# Patient Record
Sex: MALE | Race: WHITE | Employment: OTHER | URBAN - METROPOLITAN AREA
[De-identification: names, ages, dates, MRNs, and addresses within clinical notes are randomized per-mention and may not be internally consistent; named-entity substitution may affect disease eponyms.]

---

## 2020-01-20 LAB
ALBUMIN SERPL-MCNC: NORMAL G/DL
ALP BLD-CCNC: NORMAL U/L
ALT SERPL-CCNC: NORMAL U/L
ANION GAP SERPL CALCULATED.3IONS-SCNC: NORMAL MMOL/L
AST SERPL-CCNC: NORMAL U/L
BASOPHILS ABSOLUTE: NORMAL
BASOPHILS ABSOLUTE: NORMAL
BASOPHILS RELATIVE PERCENT: NORMAL
BASOPHILS RELATIVE PERCENT: NORMAL
BILIRUB SERPL-MCNC: NORMAL MG/DL
BUN BLDV-MCNC: NORMAL MG/DL
CALCIUM SERPL-MCNC: NORMAL MG/DL
CHLORIDE BLD-SCNC: NORMAL MMOL/L
CO2: NORMAL
CREAT SERPL-MCNC: NORMAL MG/DL
EOSINOPHILS ABSOLUTE: NORMAL
EOSINOPHILS ABSOLUTE: NORMAL
EOSINOPHILS RELATIVE PERCENT: NORMAL
EOSINOPHILS RELATIVE PERCENT: NORMAL
GFR CALCULATED: NORMAL
GLUCOSE BLD-MCNC: NORMAL MG/DL
HCT VFR BLD CALC: NORMAL %
HCT VFR BLD CALC: NORMAL %
HEMOGLOBIN: NORMAL
HEMOGLOBIN: NORMAL
LYMPHOCYTES ABSOLUTE: NORMAL
LYMPHOCYTES ABSOLUTE: NORMAL
LYMPHOCYTES RELATIVE PERCENT: NORMAL
LYMPHOCYTES RELATIVE PERCENT: NORMAL
MCH RBC QN AUTO: NORMAL PG
MCH RBC QN AUTO: NORMAL PG
MCHC RBC AUTO-ENTMCNC: NORMAL G/DL
MCHC RBC AUTO-ENTMCNC: NORMAL G/DL
MCV RBC AUTO: NORMAL FL
MCV RBC AUTO: NORMAL FL
MONOCYTES ABSOLUTE: NORMAL
MONOCYTES ABSOLUTE: NORMAL
MONOCYTES RELATIVE PERCENT: NORMAL
MONOCYTES RELATIVE PERCENT: NORMAL
NEUTROPHILS ABSOLUTE: NORMAL
NEUTROPHILS ABSOLUTE: NORMAL
NEUTROPHILS RELATIVE PERCENT: NORMAL
NEUTROPHILS RELATIVE PERCENT: NORMAL
PDW BLD-RTO: NORMAL %
PDW BLD-RTO: NORMAL %
PLATELET # BLD: NORMAL 10*3/UL
PLATELET # BLD: NORMAL 10*3/UL
PMV BLD AUTO: NORMAL FL
PMV BLD AUTO: NORMAL FL
POTASSIUM SERPL-SCNC: NORMAL MMOL/L
RBC # BLD: NORMAL 10*6/UL
RBC # BLD: NORMAL 10*6/UL
SODIUM BLD-SCNC: NORMAL MMOL/L
TOTAL PROTEIN: NORMAL
WBC # BLD: NORMAL 10*3/UL
WBC # BLD: NORMAL 10*3/UL

## 2020-02-03 LAB
BASOPHILS ABSOLUTE: NORMAL
BASOPHILS RELATIVE PERCENT: NORMAL
EOSINOPHILS ABSOLUTE: NORMAL
EOSINOPHILS RELATIVE PERCENT: NORMAL
HCT VFR BLD CALC: NORMAL %
HEMOGLOBIN: NORMAL
LYMPHOCYTES ABSOLUTE: NORMAL
LYMPHOCYTES RELATIVE PERCENT: NORMAL
MCH RBC QN AUTO: NORMAL PG
MCHC RBC AUTO-ENTMCNC: NORMAL G/DL
MCV RBC AUTO: NORMAL FL
MONOCYTES ABSOLUTE: NORMAL
MONOCYTES RELATIVE PERCENT: NORMAL
NEUTROPHILS ABSOLUTE: NORMAL
NEUTROPHILS RELATIVE PERCENT: NORMAL
PDW BLD-RTO: NORMAL %
PLATELET # BLD: NORMAL 10*3/UL
PMV BLD AUTO: NORMAL FL
RBC # BLD: NORMAL 10*6/UL
WBC # BLD: NORMAL 10*3/UL

## 2020-03-02 LAB
ALBUMIN SERPL-MCNC: NORMAL G/DL
ALP BLD-CCNC: NORMAL U/L
ALT SERPL-CCNC: NORMAL U/L
ANION GAP SERPL CALCULATED.3IONS-SCNC: NORMAL MMOL/L
AST SERPL-CCNC: NORMAL U/L
BASOPHILS ABSOLUTE: NORMAL
BASOPHILS RELATIVE PERCENT: NORMAL
BILIRUB SERPL-MCNC: NORMAL MG/DL
BUN BLDV-MCNC: NORMAL MG/DL
CALCIUM SERPL-MCNC: NORMAL MG/DL
CHLORIDE BLD-SCNC: NORMAL MMOL/L
CO2: NORMAL
CREAT SERPL-MCNC: NORMAL MG/DL
EOSINOPHILS ABSOLUTE: NORMAL
EOSINOPHILS RELATIVE PERCENT: NORMAL
GFR CALCULATED: NORMAL
GLUCOSE BLD-MCNC: NORMAL MG/DL
HCT VFR BLD CALC: NORMAL %
HEMOGLOBIN: NORMAL
LYMPHOCYTES ABSOLUTE: NORMAL
LYMPHOCYTES RELATIVE PERCENT: NORMAL
MCH RBC QN AUTO: NORMAL PG
MCHC RBC AUTO-ENTMCNC: NORMAL G/DL
MCV RBC AUTO: NORMAL FL
MONOCYTES ABSOLUTE: NORMAL
MONOCYTES RELATIVE PERCENT: NORMAL
NEUTROPHILS ABSOLUTE: NORMAL
NEUTROPHILS RELATIVE PERCENT: NORMAL
PDW BLD-RTO: NORMAL %
PLATELET # BLD: NORMAL 10*3/UL
PMV BLD AUTO: NORMAL FL
POTASSIUM SERPL-SCNC: NORMAL MMOL/L
RBC # BLD: NORMAL 10*6/UL
SODIUM BLD-SCNC: NORMAL MMOL/L
TOTAL PROTEIN: NORMAL
WBC # BLD: NORMAL 10*3/UL

## 2020-07-20 LAB
ALBUMIN SERPL-MCNC: NORMAL G/DL
ALP BLD-CCNC: NORMAL U/L
ALT SERPL-CCNC: NORMAL U/L
ANION GAP SERPL CALCULATED.3IONS-SCNC: NORMAL MMOL/L
AST SERPL-CCNC: NORMAL U/L
BILIRUB SERPL-MCNC: NORMAL MG/DL
BUN BLDV-MCNC: 10 MG/DL
CALCIUM SERPL-MCNC: NORMAL MG/DL
CHLORIDE BLD-SCNC: NORMAL MMOL/L
CO2: NORMAL
CREAT SERPL-MCNC: 0.86 MG/DL
GFR CALCULATED: NORMAL
GLUCOSE BLD-MCNC: 89 MG/DL
POTASSIUM SERPL-SCNC: 39 MMOL/L
SODIUM BLD-SCNC: 135 MMOL/L
TOTAL PROTEIN: NORMAL

## 2020-12-17 ENCOUNTER — OFFICE VISIT (OUTPATIENT)
Dept: FAMILY MEDICINE CLINIC | Age: 60
End: 2020-12-17
Payer: COMMERCIAL

## 2020-12-17 VITALS
TEMPERATURE: 99.3 F | HEIGHT: 71 IN | RESPIRATION RATE: 16 BRPM | OXYGEN SATURATION: 97 % | HEART RATE: 97 BPM | WEIGHT: 144.1 LBS | DIASTOLIC BLOOD PRESSURE: 85 MMHG | BODY MASS INDEX: 20.17 KG/M2 | SYSTOLIC BLOOD PRESSURE: 147 MMHG

## 2020-12-17 DIAGNOSIS — I10 ESSENTIAL HYPERTENSION: ICD-10-CM

## 2020-12-17 PROBLEM — G62.0 NEUROPATHY DUE TO CHEMOTHERAPEUTIC DRUG (HCC): Status: ACTIVE | Noted: 2020-12-17

## 2020-12-17 PROBLEM — T45.1X5A NEUROPATHY DUE TO CHEMOTHERAPEUTIC DRUG (HCC): Status: ACTIVE | Noted: 2020-12-17

## 2020-12-17 PROBLEM — C18.9 COLON CANCER METASTASIZED TO INTRA-ABDOMINAL LYMPH NODE (HCC): Status: ACTIVE | Noted: 2020-12-17

## 2020-12-17 PROBLEM — C77.2 COLON CANCER METASTASIZED TO INTRA-ABDOMINAL LYMPH NODE (HCC): Status: ACTIVE | Noted: 2020-12-17

## 2020-12-17 PROBLEM — F17.200 SMOKER: Status: ACTIVE | Noted: 2020-12-17

## 2020-12-17 LAB
ALBUMIN SERPL-MCNC: 5 G/DL (ref 3.5–5.2)
ALP BLD-CCNC: 154 U/L (ref 40–129)
ALT SERPL-CCNC: 67 U/L (ref 0–40)
ANION GAP SERPL CALCULATED.3IONS-SCNC: 14 MMOL/L (ref 7–16)
AST SERPL-CCNC: 118 U/L (ref 0–39)
BASOPHILS ABSOLUTE: 0.07 E9/L (ref 0–0.2)
BASOPHILS RELATIVE PERCENT: 0.9 % (ref 0–2)
BILIRUB SERPL-MCNC: 0.6 MG/DL (ref 0–1.2)
BILIRUBIN URINE: NEGATIVE
BLOOD, URINE: NEGATIVE
BUN BLDV-MCNC: 7 MG/DL (ref 8–23)
CALCIUM SERPL-MCNC: 10.2 MG/DL (ref 8.6–10.2)
CHLORIDE BLD-SCNC: 95 MMOL/L (ref 98–107)
CHOLESTEROL, TOTAL: 286 MG/DL (ref 0–199)
CLARITY: CLEAR
CO2: 24 MMOL/L (ref 22–29)
COLOR: YELLOW
CREAT SERPL-MCNC: 1.9 MG/DL (ref 0.7–1.2)
EOSINOPHILS ABSOLUTE: 0.08 E9/L (ref 0.05–0.5)
EOSINOPHILS RELATIVE PERCENT: 1.1 % (ref 0–6)
GFR AFRICAN AMERICAN: 44
GFR NON-AFRICAN AMERICAN: 36 ML/MIN/1.73
GLUCOSE BLD-MCNC: 70 MG/DL (ref 74–99)
GLUCOSE URINE: NEGATIVE MG/DL
HCT VFR BLD CALC: 42.6 % (ref 37–54)
HDLC SERPL-MCNC: 222 MG/DL
HEMOGLOBIN: 14.2 G/DL (ref 12.5–16.5)
IMMATURE GRANULOCYTES #: 0.04 E9/L
IMMATURE GRANULOCYTES %: 0.5 % (ref 0–5)
KETONES, URINE: NEGATIVE MG/DL
LDL CHOLESTEROL CALCULATED: 54 MG/DL (ref 0–99)
LEUKOCYTE ESTERASE, URINE: NEGATIVE
LYMPHOCYTES ABSOLUTE: 1 E9/L (ref 1.5–4)
LYMPHOCYTES RELATIVE PERCENT: 13.4 % (ref 20–42)
MCH RBC QN AUTO: 33.1 PG (ref 26–35)
MCHC RBC AUTO-ENTMCNC: 33.3 % (ref 32–34.5)
MCV RBC AUTO: 99.3 FL (ref 80–99.9)
MONOCYTES ABSOLUTE: 0.74 E9/L (ref 0.1–0.95)
MONOCYTES RELATIVE PERCENT: 9.9 % (ref 2–12)
NEUTROPHILS ABSOLUTE: 5.53 E9/L (ref 1.8–7.3)
NEUTROPHILS RELATIVE PERCENT: 74.2 % (ref 43–80)
NITRITE, URINE: NEGATIVE
PDW BLD-RTO: 13.3 FL (ref 11.5–15)
PH UA: 6 (ref 5–9)
PLATELET # BLD: 243 E9/L (ref 130–450)
PMV BLD AUTO: 9.2 FL (ref 7–12)
POTASSIUM SERPL-SCNC: 4.2 MMOL/L (ref 3.5–5)
PROTEIN UA: NEGATIVE MG/DL
RBC # BLD: 4.29 E12/L (ref 3.8–5.8)
SODIUM BLD-SCNC: 133 MMOL/L (ref 132–146)
SPECIFIC GRAVITY UA: 1.01 (ref 1–1.03)
TOTAL PROTEIN: 8.5 G/DL (ref 6.4–8.3)
TRIGL SERPL-MCNC: 49 MG/DL (ref 0–149)
UROBILINOGEN, URINE: 0.2 E.U./DL
VLDLC SERPL CALC-MCNC: 10 MG/DL
WBC # BLD: 7.5 E9/L (ref 4.5–11.5)

## 2020-12-17 PROCEDURE — G8427 DOCREV CUR MEDS BY ELIG CLIN: HCPCS | Performed by: INTERNAL MEDICINE

## 2020-12-17 PROCEDURE — G8420 CALC BMI NORM PARAMETERS: HCPCS | Performed by: INTERNAL MEDICINE

## 2020-12-17 PROCEDURE — G8484 FLU IMMUNIZE NO ADMIN: HCPCS | Performed by: INTERNAL MEDICINE

## 2020-12-17 PROCEDURE — 99202 OFFICE O/P NEW SF 15 MIN: CPT | Performed by: INTERNAL MEDICINE

## 2020-12-17 PROCEDURE — 4004F PT TOBACCO SCREEN RCVD TLK: CPT | Performed by: INTERNAL MEDICINE

## 2020-12-17 PROCEDURE — 3017F COLORECTAL CA SCREEN DOC REV: CPT | Performed by: INTERNAL MEDICINE

## 2020-12-17 RX ORDER — AMLODIPINE BESYLATE 5 MG/1
5 TABLET ORAL DAILY
COMMUNITY
End: 2020-12-17 | Stop reason: SDUPTHER

## 2020-12-17 RX ORDER — AMLODIPINE BESYLATE 5 MG/1
5 TABLET ORAL DAILY
Qty: 90 TABLET | Refills: 0 | Status: CANCELLED | OUTPATIENT
Start: 2020-12-17

## 2020-12-17 RX ORDER — DULOXETIN HYDROCHLORIDE 60 MG/1
60 CAPSULE, DELAYED RELEASE ORAL DAILY
COMMUNITY
End: 2020-12-17 | Stop reason: ALTCHOICE

## 2020-12-17 RX ORDER — AMLODIPINE BESYLATE 5 MG/1
5 TABLET ORAL DAILY
Qty: 90 TABLET | Refills: 1 | Status: SHIPPED
Start: 2020-12-17 | End: 2021-03-25 | Stop reason: SDUPTHER

## 2020-12-17 RX ORDER — GABAPENTIN 100 MG/1
100 CAPSULE ORAL 3 TIMES DAILY
Qty: 90 CAPSULE | Refills: 5 | Status: SHIPPED
Start: 2020-12-17 | End: 2021-06-29 | Stop reason: SDUPTHER

## 2020-12-17 SDOH — ECONOMIC STABILITY: TRANSPORTATION INSECURITY
IN THE PAST 12 MONTHS, HAS LACK OF TRANSPORTATION KEPT YOU FROM MEETINGS, WORK, OR FROM GETTING THINGS NEEDED FOR DAILY LIVING?: NO

## 2020-12-17 SDOH — ECONOMIC STABILITY: FOOD INSECURITY: WITHIN THE PAST 12 MONTHS, YOU WORRIED THAT YOUR FOOD WOULD RUN OUT BEFORE YOU GOT MONEY TO BUY MORE.: SOMETIMES TRUE

## 2020-12-17 SDOH — ECONOMIC STABILITY: TRANSPORTATION INSECURITY
IN THE PAST 12 MONTHS, HAS THE LACK OF TRANSPORTATION KEPT YOU FROM MEDICAL APPOINTMENTS OR FROM GETTING MEDICATIONS?: NO

## 2020-12-17 SDOH — ECONOMIC STABILITY: FOOD INSECURITY: WITHIN THE PAST 12 MONTHS, THE FOOD YOU BOUGHT JUST DIDN'T LAST AND YOU DIDN'T HAVE MONEY TO GET MORE.: SOMETIMES TRUE

## 2020-12-17 SDOH — ECONOMIC STABILITY: INCOME INSECURITY: HOW HARD IS IT FOR YOU TO PAY FOR THE VERY BASICS LIKE FOOD, HOUSING, MEDICAL CARE, AND HEATING?: NOT VERY HARD

## 2020-12-17 ASSESSMENT — ENCOUNTER SYMPTOMS
VOMITING: 0
COLOR CHANGE: 0
COUGH: 0
DIARRHEA: 0
WHEEZING: 0
NAUSEA: 0
ABDOMINAL PAIN: 0
SHORTNESS OF BREATH: 0

## 2020-12-17 ASSESSMENT — PATIENT HEALTH QUESTIONNAIRE - PHQ9
SUM OF ALL RESPONSES TO PHQ QUESTIONS 1-9: 0
SUM OF ALL RESPONSES TO PHQ QUESTIONS 1-9: 0
SUM OF ALL RESPONSES TO PHQ9 QUESTIONS 1 & 2: 0
SUM OF ALL RESPONSES TO PHQ QUESTIONS 1-9: 0
1. LITTLE INTEREST OR PLEASURE IN DOING THINGS: 0
2. FEELING DOWN, DEPRESSED OR HOPELESS: 0

## 2020-12-17 NOTE — PROGRESS NOTES
12/17/2020    Chief Complaint   Patient presents with   [address] Other     due for port flush     Medication Refill     neuropathy: currently taking cymbalta but does not feel it is helping much    Referral - General     oncology    Flu Vaccine     pt refused       HPI  Hx of colon cancerdiagnosed Nov 2019 w/ 2/31 lymph nodes positive. and underwent partial colon resection. Had L subclavian port placed in early 2020 and had several rounds of chemotherapy ending July. He is due for scans to assess status of his cancer and requires referral to an oncologist.   C/o mild burning/tingling in fingers due to chemo induced neuropathy. Hx of htn, recently ran out of meds. Generally feeling well. Review of Systems   Constitutional: Negative for chills, diaphoresis and fever. Respiratory: Negative for cough, shortness of breath and wheezing. Cardiovascular: Negative for chest pain and leg swelling. Gastrointestinal: Negative for abdominal pain, diarrhea, nausea and vomiting. Genitourinary: Negative for dysuria and hematuria. Musculoskeletal: Negative for gait problem and joint swelling. Skin: Negative for color change and wound. Neurological: Negative for syncope, facial asymmetry and light-headedness. Hematological: Negative for adenopathy. Psychiatric/Behavioral: Negative for confusion and decreased concentration. Past Medical History:   Diagnosis Date    Colon cancer ([facility] [address]     Hypertension        Social History     Tobacco Use    Smoking status: Current Every Day Smoker     Packs/day: 1.00     Years: 40.00     Pack years: 40.00     Types: Cigarettes    Smokeless tobacco: Never Used   Substance Use Topics    Alcohol use: Yes     Alcohol/week: 70.0 standard drinks     Types: 70 Cans of beer per week     Social History     Substance and Sexual Activity   Drug Use Never       Past surgical, family, social history reviewed and updated.     BP (!) 147/85   Pulse 97   Temp 99.3 °F (37.4 °C) (Temporal)   Resp 16   Ht 5' 11\" (1.803 m)   Wt 144 lb 1.6 oz (65.4 kg)   SpO2 97%   BMI 20.10 kg/m²     Physical Exam  Vitals signs reviewed. Constitutional:       General: He is not in acute distress. Eyes:      General: No scleral icterus. Cardiovascular:      Heart sounds: Normal heart sounds. No murmur. No gallop. Pulmonary:      Breath sounds: Normal breath sounds. No wheezing or rales. Musculoskeletal:      Right lower leg: No edema. Left lower leg: No edema. Neurological:      Mental Status: He is alert. Cranial Nerves: No cranial nerve deficit. Gait: Gait normal.   Psychiatric:         Thought Content: Thought content normal.       1. Colon cancer metastasized to intra-abdominal lymph node (HCC)  Stable. Will refer to establish with oncology and requested records  - Ambulatory referral to Oncology    2. Neuropathy due to chemotherapeutic drug ([address]  Used Cymbalta in recent past but now asking about alternative. No current depressed mood or anhedonia. Trial of gabapentin    3. Essential hypertension  Resume amlodipine  - Comprehensive Metabolic Panel; Future  - Lipid Panel; Future  - CBC Auto Differential; Future  - Urinalysis; Future    4.  Smoker  vaping now with intention of quitting

## 2020-12-22 ENCOUNTER — HOSPITAL ENCOUNTER (OUTPATIENT)
Dept: INFUSION THERAPY | Age: 60
Discharge: HOME | End: 2020-12-22
Payer: COMMERCIAL

## 2020-12-22 ENCOUNTER — OFFICE VISIT (OUTPATIENT)
Dept: ONCOLOGY | Age: 60
End: 2020-12-22
Payer: COMMERCIAL

## 2020-12-22 VITALS
TEMPERATURE: 98 F | DIASTOLIC BLOOD PRESSURE: 95 MMHG | HEART RATE: 103 BPM | HEIGHT: 71 IN | BODY MASS INDEX: 20.27 KG/M2 | WEIGHT: 144.8 LBS | OXYGEN SATURATION: 99 % | SYSTOLIC BLOOD PRESSURE: 172 MMHG

## 2020-12-22 DIAGNOSIS — C18.9 COLON CANCER METASTASIZED TO INTRA-ABDOMINAL LYMPH NODE (HCC): Primary | ICD-10-CM

## 2020-12-22 DIAGNOSIS — C77.2 COLON CANCER METASTASIZED TO INTRA-ABDOMINAL LYMPH NODE (HCC): Primary | ICD-10-CM

## 2020-12-22 LAB
ALBUMIN SERPL-MCNC: 4.7 G/DL (ref 3.5–5.2)
ALP BLD-CCNC: 176 U/L (ref 40–129)
ALT SERPL-CCNC: 75 U/L (ref 0–40)
ANION GAP SERPL CALCULATED.3IONS-SCNC: 13 MMOL/L (ref 7–16)
AST SERPL-CCNC: 129 U/L (ref 0–39)
BASOPHILS ABSOLUTE: 0.05 E9/L (ref 0–0.2)
BASOPHILS RELATIVE PERCENT: 0.7 % (ref 0–2)
BILIRUB SERPL-MCNC: 0.6 MG/DL (ref 0–1.2)
BUN BLDV-MCNC: 9 MG/DL (ref 8–23)
CALCIUM SERPL-MCNC: 9.7 MG/DL (ref 8.6–10.2)
CEA: 7.4 NG/ML (ref 0–5.2)
CHLORIDE BLD-SCNC: 97 MMOL/L (ref 98–107)
CO2: 25 MMOL/L (ref 22–29)
CREAT SERPL-MCNC: 0.8 MG/DL (ref 0.7–1.2)
EOSINOPHILS ABSOLUTE: 0.06 E9/L (ref 0.05–0.5)
EOSINOPHILS RELATIVE PERCENT: 0.8 % (ref 0–6)
GFR AFRICAN AMERICAN: >60
GFR NON-AFRICAN AMERICAN: >60 ML/MIN/1.73
GLUCOSE BLD-MCNC: 91 MG/DL (ref 74–99)
HCT VFR BLD CALC: 40.2 % (ref 37–54)
HEMOGLOBIN: 13.8 G/DL (ref 12.5–16.5)
IMMATURE GRANULOCYTES #: 0.04 E9/L
IMMATURE GRANULOCYTES %: 0.5 % (ref 0–5)
LYMPHOCYTES ABSOLUTE: 0.91 E9/L (ref 1.5–4)
LYMPHOCYTES RELATIVE PERCENT: 12.2 % (ref 20–42)
MCH RBC QN AUTO: 33.4 PG (ref 26–35)
MCHC RBC AUTO-ENTMCNC: 34.3 % (ref 32–34.5)
MCV RBC AUTO: 97.3 FL (ref 80–99.9)
MONOCYTES ABSOLUTE: 0.79 E9/L (ref 0.1–0.95)
MONOCYTES RELATIVE PERCENT: 10.6 % (ref 2–12)
NEUTROPHILS ABSOLUTE: 5.6 E9/L (ref 1.8–7.3)
NEUTROPHILS RELATIVE PERCENT: 75.2 % (ref 43–80)
PDW BLD-RTO: 13.2 FL (ref 11.5–15)
PLATELET # BLD: 222 E9/L (ref 130–450)
PMV BLD AUTO: 8.4 FL (ref 7–12)
POTASSIUM SERPL-SCNC: 4 MMOL/L (ref 3.5–5)
RBC # BLD: 4.13 E12/L (ref 3.8–5.8)
SODIUM BLD-SCNC: 135 MMOL/L (ref 132–146)
TOTAL PROTEIN: 8.2 G/DL (ref 6.4–8.3)
WBC # BLD: 7.5 E9/L (ref 4.5–11.5)

## 2020-12-22 PROCEDURE — 99214 OFFICE O/P EST MOD 30 MIN: CPT

## 2020-12-22 PROCEDURE — 82378 CARCINOEMBRYONIC ANTIGEN: CPT

## 2020-12-22 PROCEDURE — 80053 COMPREHEN METABOLIC PANEL: CPT

## 2020-12-22 PROCEDURE — 85025 COMPLETE CBC W/AUTO DIFF WBC: CPT

## 2020-12-22 PROCEDURE — 6360000002 HC RX W HCPCS: Performed by: INTERNAL MEDICINE

## 2020-12-22 PROCEDURE — 2580000003 HC RX 258: Performed by: INTERNAL MEDICINE

## 2020-12-22 RX ORDER — HEPARIN SODIUM (PORCINE) LOCK FLUSH IV SOLN 100 UNIT/ML 100 UNIT/ML
500 SOLUTION INTRAVENOUS PRN
Status: CANCELLED | OUTPATIENT
Start: 2020-12-22

## 2020-12-22 RX ORDER — SODIUM CHLORIDE 0.9 % (FLUSH) 0.9 %
10 SYRINGE (ML) INJECTION PRN
Status: DISCONTINUED | OUTPATIENT
Start: 2020-12-22 | End: 2020-12-23 | Stop reason: HOSPADM

## 2020-12-22 RX ORDER — HEPARIN SODIUM (PORCINE) LOCK FLUSH IV SOLN 100 UNIT/ML 100 UNIT/ML
500 SOLUTION INTRAVENOUS PRN
Status: DISCONTINUED | OUTPATIENT
Start: 2020-12-22 | End: 2020-12-23 | Stop reason: HOSPADM

## 2020-12-22 RX ORDER — SODIUM CHLORIDE 0.9 % (FLUSH) 0.9 %
10 SYRINGE (ML) INJECTION PRN
Status: CANCELLED | OUTPATIENT
Start: 2020-12-22

## 2020-12-22 RX ORDER — CALCIUM CITRATE/VITAMIN D3 200MG-6.25
1 TABLET ORAL DAILY PRN
COMMUNITY

## 2020-12-22 RX ADMIN — SODIUM CHLORIDE, PRESERVATIVE FREE 10 ML: 5 INJECTION INTRAVENOUS at 14:54

## 2020-12-22 RX ADMIN — Medication 500 UNITS: at 14:54

## 2020-12-22 NOTE — PROGRESS NOTES
Shantal Schulte  1960 63 y.o. Referring Physician:     PCP: Angel Pickard MD    There were no vitals filed for this visit. Wt Readings from Last 3 Encounters:   20 144 lb 12.8 oz (65.7 kg)   20 144 lb 1.6 oz (65.4 kg)        There is no height or weight on file to calculate BMI. Chief Complaint: No chief complaint on file. Cancer Staging  No matching staging information was found for the patient. Prior Radiation Therapy? NO    Concurrent Chemo/radiation? NO    Prior Chemotherapy? YES: Site Treated: colon          Facility: [place], [place]          Date:     Prior Hormonal Therapy? NO    Head and Neck Cancer? No, patient does NOT have HN cancer. LMP: na    Age at first Menses: na    : na    Para: na          Current Outpatient Medications:     Multiple Vitamins-Minerals (MULTIVITAMIN GUMMIES MENS) CHEW, Take 1 tablet by mouth daily as needed, Disp: , Rfl:     amLODIPine (NORVASC) 5 MG tablet, Take 1 tablet by mouth daily, Disp: 90 tablet, Rfl: 1    gabapentin (NEURONTIN) 100 MG capsule, Take 1 capsule by mouth 3 times daily for 180 days. , Disp: 90 capsule, Rfl: 5       Past Medical History:   Diagnosis Date    Cataract     bilateral    Colon cancer ([address]     Hypertension     Neuropathy     finger/feet from chemotherapy       Past Surgical History:   Procedure Laterality Date    COLON SURGERY      EYE SURGERY Bilateral     SUBTOTAL COLECTOMY  2019       Family History   Problem Relation Age of Onset    Stroke Mother     High Blood Pressure Mother     Heart Disease Father     Heart Attack Father     Kidney Disease Sister     Cancer Brother         Prostate    No Known Problems Maternal Aunt     No Known Problems Maternal Uncle     No Known Problems Paternal Aunt     No Known Problems Paternal Uncle     No Known Problems Maternal Grandmother     No Known Problems Maternal Grandfather     No Known Problems Paternal Grandmother No Known Problems Paternal Grandfather     No Known Problems Maternal Cousin     No Known Problems Paternal Cousin     No Known Problems Brother     Anxiety Disorder Daughter     No Known Problems Grandchild        Social History     Socioeconomic History    Marital status:      Spouse name: Not on file    Number of children: Not on file    Years of education: Not on file    Highest education level: Not on file   Occupational History    Not on file   Social Needs    Financial resource strain: Not very hard    Food insecurity     Worry: Sometimes true     Inability: Sometimes true    Transportation needs     Medical: No     Non-medical: No   Tobacco Use    Smoking status: Current Every Day Smoker     Packs/day: 1.00     Years: 40.00     Pack years: 40.00     Types: Cigarettes    Smokeless tobacco: Never Used    Tobacco comment: Informed of [facility] Cessation program, gave pamphlet. Substance and Sexual Activity    Alcohol use:  Yes     Alcohol/week: 70.0 standard drinks     Types: 70 Cans of beer per week    Drug use: Never    Sexual activity: Not Currently   Lifestyle    Physical activity     Days per week: Not on file     Minutes per session: Not on file    Stress: Not on file   Relationships    Social connections     Talks on phone: Not on file     Gets together: Not on file     Attends Taoism service: Not on file     Active member of club or organization: Not on file     Attends meetings of clubs or organizations: Not on file     Relationship status: Not on file    Intimate partner violence     Fear of current or ex partner: Not on file     Emotionally abused: Not on file     Physically abused: Not on file     Forced sexual activity: Not on file   Other Topics Concern    Not on file   Social History Narrative    Not on file           Occupation: , currently on permanent disability  Retired:  NO          REVIEW OF SYSTEMS:     Pacemaker/Defibulator/ICD:  No    Mediport: Yes, left chest           FALLS RISK SCREENING ASSESSMENT    Instructions:  Assess the patient and [place] the appropriate indicators that are present for fall risk identification. Total the numbers circled and assign a fall risk score from Table 2.  Reassess patient at a minimum every 12 weeks or with status change. Assessment   Date  12/22/2020     1. Mental Ability: confusion/cognitively impaired No - 0       2. Elimination Issues: incontinence, frequency No - 0       3. Ambulatory: use of assistive devices (walker, cane, off-loading devices), attached to equipment (IV pole, oxygen) No - 0     4. Sensory Limitations: dizziness, vertigo, impaired vision No - 0       5. Age Less than 65 years - 0       6. Medication: diuretics, strong analgesics, hypnotics, sedatives, antihypertensive agents   Yes - 3   7. Falls:  recent history of falls within the last 3 months (not to include slipping or tripping)   No - 0   TOTAL 3    If score of 4 or greater was education given? No       TABLE 2   Risk Score Risk Level Plan of Care   0-3 Little or  No Risk 1. Provide assistance as indicated for ambulation activities  2. Reorient confused/cognitively impaired patient  3. Call-light/bell within patient's reach  4. Chair/bed in low position, stretcher/bed with siderails up except when performing patient care activities  5. Educate patient/family/caregiver on falls prevention  6.  Reassess in 12 weeks or with any noted change in patient condition which places them at a risk for a fall   4-6 Moderate Risk 1. Provide assistance as indicated for ambulation activities  2. Reorient confused/cognitively impaired patient  3. Call-light/bell within patient's reach  4. Chair/bed in low position, stretcher/bed with siderails up except when performing patient care activities  5. Educate patient/family/caregiver on falls prevention  6.   Falls risk precaution (Yellow sticker Level II) placed on patient chart   7 or   Higher High Risk 1. Place patient in easily observable treatment room  2. Patient attended at all times by family member or staff  3. Provide assistance as indicated for ambulation activities  4. Reorient confused/cognitively impaired patient  5. Call-light/bell within patient's reach  6. Chair/bed in low position, stretcher/bed with siderails up except when performing patient care activities  7. Educate patient/family/caregiver on falls prevention  8. Falls risk precaution (Yellow sticker Level III) placed on patient chart           MALNUTRITION RISK SCREENING ASSESSMENT    Instructions:  Assess the patient and enter the appropriate indicators that are present for nutrition risk identification. Total the numbers entered and assign a risk score. Follow the appropriate action for total score listed below. Assessment   Date  12/22/2020     Have you lost weight without trying? 0- No     Have you been eating poorly because of a decreased appetite? 0- No   3. Do you have a diagnosis of head and neck cancer? 0- No                                                                                    TOTAL 0        Score of 0-1: No action  Score 2 or greater:   For Non-Diabetic Patient: Recommend adding Ensure Enlive 2 x daily and provide patient with Ensure wellness bag with coupons  For Diabetic Patient: Recommend adding Glucerna Shake 2 x daily and provide patient with Glucerna Wellness bag with coupons  Route to the dietitian via [address]    Are you having  difficulty performing daily routine tasks  due to fatigue or weakness (ie: bathing/showering, dressing, housework, meal prep, work, child Nisreen Areola): No     Do you have any arm flexibility/ROM restrictions, swelling or pain that limit activity: No     Any changes in memory, attention/focus that impact daily activities: No     Do you avoid participation in leisure/social activity due weakness, fatigue or pain: No     ARE ANY OF THE ABOVE ARE ANSWERED YES: No          PT ASSESSMENT FOR REFERRAL    Have you had any recent falls in past 2 months: No     Do you have difficulty  going up/down stairs: No     Are you having difficulty walking: No     Do you often hold onto furniture/environmental supports or feel off balance when you are walking: No     Do you need to take rest breaks when you are walking: No     Any pain on scale of 1-10 that limits your mobility: No 0/10    ARE ANY OF THE ABOVE ARE ANSWERED YES: No       PREHAB AUDIOLOGY REFERRAL    - Is patient planned to receive Cisplatin? No. This patient is not planned to start Cisplatin. - Is patient complaining of new onset hearing loss? No. Patient is not complaining of new onset hearing loss.         Yuri Hernandez

## 2020-12-22 NOTE — PROGRESS NOTES
PORT FLUSH    Patient presents to clinic for [facility] today. single  SQ port accessed per policy using 94Y, . 75 inch Wagner needle for good blood return. Aspirate for waste and specimen sent to lab. Site flushed easily with 10 mL NSS followed by 5 mL Heparin solution 100 units/ml rinse prior to de-access. Dry sterile dressing to area. Tolerated procedure well. Encouraged to schedule port flush every 4 weeks.

## 2020-12-22 NOTE — PROGRESS NOTES
[address]  [address] [place] [address]   Hematology/Oncology  Consult      Patient Name: Yasmine Lucia  YOB: 1960  PCP: Trey Brooks MD   Referring Provider: [id number] / [place] [id number]     Reason for Consultation:   Chief Complaint   Patient presents with    Follow-up        History of Present Illness: This pt is a pleasant 64 yo male who was diagnosed with colon cancer in 11/19 (reportedly stage IIIB), s/p resection and 2/31 LNs positive, and reportedly received adj chemotherapy and has chronic neuropathy from this. No records are available for review today, however he states he received 8 cycles of full dose FOLFOX and 4 cycles of DR FOLFOX. He still has his PORT. He has no acute complaints today, including abdominal pain, change in bowel habits, pain with defecation, tenesmus, hematochezia or melena. He has maintained a stable weight. He overall feels well    Diagnostic Data:     Past Medical History:   Diagnosis Date    Colon cancer ([facility] [address]     Hypertension        Patient Active Problem List    Diagnosis Date Noted    Colon cancer metastasized to intra-abdominal lymph node ([facility] [address] 12/17/2020    Neuropathy due to chemotherapeutic drug ([facility] [address] 12/17/2020    Essential hypertension 12/17/2020    Smoker 12/17/2020        Past Surgical History:   Procedure Laterality Date    COLON SURGERY  2019    SUBTOTAL COLECTOMY  11/21/2019       Family History  Family History   Problem Relation Age of Onset    Stroke Mother     Heart Disease Father        Social History    TOBACCO:   reports that he has been smoking cigarettes. He has a 40.00 pack-year smoking history. He has never used smokeless tobacco.  ETOH:   reports current alcohol use of about 70.0 standard drinks of alcohol per week. Home Medications  Prior to Admission medications    Medication Sig Start Date End Date Taking?  Authorizing Provider   amLODIPine (NORVASC) 5 MG tablet Take 1 tablet by mouth daily 12/17/20   Costa Huertas MD   gabapentin (NEURONTIN) 100 MG capsule Take 1 capsule by mouth 3 times daily for 180 days. 12/17/20 6/15/21  Costa Huertas MD       Allergies  No Known Allergies    Review of Systems:    + for neuropathy of BL U/LE      Objective  BP (!) 171/92 (Site: Left Upper Arm, Position: Sitting, Cuff Size: Medium Adult)   Pulse 110   Temp 98 °F (36.7 °C)   Ht 5' 11\" (1.803 m)   Wt 144 lb 12.8 oz (65.7 kg)   SpO2 99%   BMI 20.20 kg/m²     Physical Performance Status    Physical Exam:  General: AAO to person, place, time, and purpose, appears stated age, cooperative, no acute distress, pleasant   Head and neck : PERRLA, EOMI . Sclera non icteric. Oropharynx : Clear  Neck: no JVD,  no adenopathy  LYMPHATICS : No LAD  Lungs: Clear to auscultation   Heart: Regular rate and regular rhythm, no murmur, normal S1, S2  Abdomen: Soft, non-tender;no masses, no organomegaly  Extremities: No edema,no cyanosis, no clubbing. Skin:  No rash. Neurologic:Cranial nerves grossly intact. No focal motor or sensory deficits .     Recent Laboratory Data-   Lab Results   Component Value Date    WBC 7.5 12/17/2020    HGB 14.2 12/17/2020    HCT 42.6 12/17/2020    MCV 99.3 12/17/2020     12/17/2020    LYMPHOPCT 13.4 (L) 12/17/2020    RBC 4.29 12/17/2020    MCH 33.1 12/17/2020    MCHC 33.3 12/17/2020    RDW 13.3 12/17/2020    NEUTOPHILPCT 74.2 12/17/2020    MONOPCT 9.9 12/17/2020    BASOPCT 0.9 12/17/2020    NEUTROABS 5.53 12/17/2020    LYMPHSABS 1.00 (L) 12/17/2020    MONOSABS 0.74 12/17/2020    EOSABS 0.08 12/17/2020    BASOSABS 0.07 12/17/2020       Lab Results   Component Value Date     12/17/2020    K 4.2 12/17/2020    CL 95 (L) 12/17/2020    CO2 24 12/17/2020    BUN 7 (L) 12/17/2020    CREATININE 1.9 (H) 12/17/2020    GLUCOSE 70 (L) 12/17/2020    CALCIUM 10.2 12/17/2020    PROT 8.5 (H) 12/17/2020    LABALBU 5.0 12/17/2020    BILITOT 0.6 12/17/2020    ALKPHOS 154

## 2020-12-23 ENCOUNTER — TELEPHONE (OUTPATIENT)
Dept: CASE MANAGEMENT | Age: 60
End: 2020-12-23

## 2020-12-23 NOTE — TELEPHONE ENCOUNTER
Late entry 12/22/2020: Met with patient and his wife during his initial consult with Dr. Rc العلي to establish local care for a history of stage IIIB colon cancer. He had a resection and received 12 cycles of FOLFOX earlier this year in [place] and decided to move to [place] to be closer to family. He stated he needs to have his mediport flushed since it hasn't been done since 10/2020. Introduced nurse navigator role and that his nurse navigator is Angelica Putnam. Informed Racquel Smith is out of the office, but will be following with him while at the clinic, provided her contact information and informed of other supportive services in clinic: , nutrition and financial navigator. Patient is supported by his wife and denied issues with living arrangements, transportation, insurance and prescription coverage. He stated he is on permanent disability. He reported his appetite as \"good\" with a stable weight and denied unplanned weight loss. Patient smokes, stated he's planning to quit buy vaping. Informed him a good way to quit smoking is cold turkey and discouraged vaping. Informed of [address] Cessation Program and gave pamphlet. Patient was informed he'll be contacted when scheduled for an abdominal and pelvis CT scan, then return in 1 month to meet with Dr. Rc العلي to discuss his results and continued plan of care. He denied needs today, encouraged him to call should any arise, he verbalized understanding. Kimberly Shrestha RN, ADN, BSE, O[facility] Patient Nurse Navigator

## 2021-01-02 ENCOUNTER — HOSPITAL ENCOUNTER (OUTPATIENT)
Dept: CT IMAGING | Age: 61
Discharge: HOME | End: 2021-01-02
Payer: COMMERCIAL

## 2021-01-02 DIAGNOSIS — C18.9 COLON CANCER METASTASIZED TO INTRA-ABDOMINAL LYMPH NODE (HCC): ICD-10-CM

## 2021-01-02 DIAGNOSIS — C77.2 COLON CANCER METASTASIZED TO INTRA-ABDOMINAL LYMPH NODE (HCC): ICD-10-CM

## 2021-01-02 PROCEDURE — 74177 CT ABD & PELVIS W/CONTRAST: CPT

## 2021-01-02 PROCEDURE — 6360000004 HC RX CONTRAST MEDICATION: Performed by: RADIOLOGY

## 2021-01-02 RX ADMIN — IOHEXOL 50 ML: 240 INJECTION, SOLUTION INTRATHECAL; INTRAVASCULAR; INTRAVENOUS; ORAL at 09:18

## 2021-01-02 RX ADMIN — IOPAMIDOL 75 ML: 755 INJECTION, SOLUTION INTRAVENOUS at 09:18

## 2021-01-19 ENCOUNTER — TELEPHONE (OUTPATIENT)
Dept: INFUSION THERAPY | Age: 61
End: 2021-01-19

## 2021-01-19 ENCOUNTER — OFFICE VISIT (OUTPATIENT)
Dept: ONCOLOGY | Age: 61
End: 2021-01-19
Payer: COMMERCIAL

## 2021-01-19 VITALS
TEMPERATURE: 98.6 F | HEART RATE: 98 BPM | SYSTOLIC BLOOD PRESSURE: 165 MMHG | BODY MASS INDEX: 20.34 KG/M2 | WEIGHT: 145.3 LBS | DIASTOLIC BLOOD PRESSURE: 89 MMHG | OXYGEN SATURATION: 99 % | HEIGHT: 71 IN

## 2021-01-19 DIAGNOSIS — C18.9 COLON CANCER METASTASIZED TO INTRA-ABDOMINAL LYMPH NODE (HCC): Primary | ICD-10-CM

## 2021-01-19 DIAGNOSIS — C77.2 COLON CANCER METASTASIZED TO INTRA-ABDOMINAL LYMPH NODE (HCC): Primary | ICD-10-CM

## 2021-01-19 PROCEDURE — 99213 OFFICE O/P EST LOW 20 MIN: CPT

## 2021-01-19 NOTE — TELEPHONE ENCOUNTER
Faxed referral, physician progress note and clinicals to:  [id number] [facility] Cyrus Lowe MD) to schedule patient referral appointment.

## 2021-01-19 NOTE — PROGRESS NOTES
Problems Paternal Aunt     No Known Problems Paternal Uncle     No Known Problems Maternal Grandmother     No Known Problems Maternal Grandfather     No Known Problems Paternal Grandmother     No Known Problems Paternal Grandfather     No Known Problems Maternal Cousin     No Known Problems Paternal Cousin     No Known Problems Brother     Anxiety Disorder Daughter     No Known Problems Grandchild        Social History    TOBACCO:   reports that he has been smoking cigarettes. He has a 40.00 pack-year smoking history. He has never used smokeless tobacco.  ETOH:   reports current alcohol use of about 70.0 standard drinks of alcohol per week. Home Medications  Prior to Admission medications    Medication Sig Start Date End Date Taking? Authorizing Provider   Multiple Vitamins-Minerals (MULTIVITAMIN GUMMIES MENS) CHEW Take 1 tablet by mouth daily as needed    Historical Provider, MD   amLODIPine (NORVASC) 5 MG tablet Take 1 tablet by mouth daily 12/17/20   Mary Edgar MD   gabapentin (NEURONTIN) 100 MG capsule Take 1 capsule by mouth 3 times daily for 180 days. 12/17/20 6/15/21  Mary Edgar MD       Allergies  No Known Allergies    Review of Systems:    + for neuropathy of BL U/LE      Objective  There were no vitals taken for this visit. Physical Performance Status    Physical Exam:  General: AAO to person, place, time, and purpose, appears stated age, cooperative, no acute distress, pleasant   Head and neck : PERRLA, EOMI . Sclera non icteric. Oropharynx : Clear  Neck: no JVD,  no adenopathy  LYMPHATICS : No LAD  Lungs: Clear to auscultation   Heart: Regular rate and regular rhythm, no murmur, normal S1, S2  Abdomen: Soft, non-tender;no masses, no organomegaly  Extremities: No edema,no cyanosis, no clubbing. Skin:  No rash. Neurologic:Cranial nerves grossly intact. No focal motor or sensory deficits .     Recent Laboratory Data-   Lab Results   Component Value Date    WBC 7.5 12/22/2020    HGB 13.8 12/22/2020    HCT 40.2 12/22/2020    MCV 97.3 12/22/2020     12/22/2020    LYMPHOPCT 12.2 (L) 12/22/2020    RBC 4.13 12/22/2020    MCH 33.4 12/22/2020    MCHC 34.3 12/22/2020    RDW 13.2 12/22/2020    NEUTOPHILPCT 75.2 12/22/2020    MONOPCT 10.6 12/22/2020    BASOPCT 0.7 12/22/2020    NEUTROABS 5.60 12/22/2020    LYMPHSABS 0.91 (L) 12/22/2020    MONOSABS 0.79 12/22/2020    EOSABS 0.06 12/22/2020    BASOSABS 0.05 12/22/2020       Lab Results   Component Value Date     12/22/2020    K 4.0 12/22/2020    CL 97 (L) 12/22/2020    CO2 25 12/22/2020    BUN 9 12/22/2020    CREATININE 0.8 12/22/2020    GLUCOSE 91 12/22/2020    CALCIUM 9.7 12/22/2020    PROT 8.2 12/22/2020    LABALBU 4.7 12/22/2020    BILITOT 0.6 12/22/2020    ALKPHOS 176 (H) 12/22/2020     (H) 12/22/2020    ALT 75 (H) 12/22/2020    LABGLOM >60 12/22/2020    GFRAA >60 12/22/2020       No results found for: IRON, TIBC, FERRITIN        Radiology-    No results found. ASSESSMENT/PLAN :    Mary Velazco was seen today for follow-up. Diagnoses and all orders for this visit:    Colon cancer metastasized to intra-abdominal lymph node ([facility] [address]    64 yo male  Reportedly history of stage IIIB colon cancer  S/p resection and adj FOLFOX x 12 with some DR at the end  Iatrogenic neuropathy    - Will request records from Dr. Fam Kelly including pathology (reported 2/31)  - He is due for CT A/P and this is ordered today  - Will need referral at next visit for colonoscopy  - CBC/CMP/CEA today  - RTC in 1 months to review results, then q 3 months with port flush q ~6 weeks    1/19/21  - Stage III B colon cancer s/p resection and 12 cycles adj FOLFOX  - CT A/P with no evidence of recurrence of metastatic disease.  Diffuse hepatic steatosis was ntoed  - CBC and CMP was WNL with the exception of elevated LFTs, likely due to the steatosis  - CEA 7.4, will trend  - Refer to Dr. Samia Diallo for colonoscopy surveillance  - PORT flush q 6 weeks  - RTC

## 2021-01-21 ENCOUNTER — OFFICE VISIT (OUTPATIENT)
Dept: FAMILY MEDICINE CLINIC | Age: 61
End: 2021-01-21
Payer: COMMERCIAL

## 2021-01-21 VITALS
SYSTOLIC BLOOD PRESSURE: 129 MMHG | OXYGEN SATURATION: 98 % | RESPIRATION RATE: 16 BRPM | HEART RATE: 94 BPM | BODY MASS INDEX: 20.8 KG/M2 | WEIGHT: 148.6 LBS | DIASTOLIC BLOOD PRESSURE: 75 MMHG | TEMPERATURE: 99.3 F | HEIGHT: 71 IN

## 2021-01-21 DIAGNOSIS — F17.200 SMOKER: ICD-10-CM

## 2021-01-21 DIAGNOSIS — I10 ESSENTIAL HYPERTENSION: ICD-10-CM

## 2021-01-21 DIAGNOSIS — C18.9 COLON CANCER METASTASIZED TO INTRA-ABDOMINAL LYMPH NODE (HCC): Primary | ICD-10-CM

## 2021-01-21 DIAGNOSIS — C77.2 COLON CANCER METASTASIZED TO INTRA-ABDOMINAL LYMPH NODE (HCC): Primary | ICD-10-CM

## 2021-01-21 DIAGNOSIS — T45.1X5A NEUROPATHY DUE TO CHEMOTHERAPEUTIC DRUG (HCC): ICD-10-CM

## 2021-01-21 DIAGNOSIS — G62.0 NEUROPATHY DUE TO CHEMOTHERAPEUTIC DRUG (HCC): ICD-10-CM

## 2021-01-21 PROCEDURE — G8427 DOCREV CUR MEDS BY ELIG CLIN: HCPCS | Performed by: INTERNAL MEDICINE

## 2021-01-21 PROCEDURE — 3017F COLORECTAL CA SCREEN DOC REV: CPT | Performed by: INTERNAL MEDICINE

## 2021-01-21 PROCEDURE — G8484 FLU IMMUNIZE NO ADMIN: HCPCS | Performed by: INTERNAL MEDICINE

## 2021-01-21 PROCEDURE — G8420 CALC BMI NORM PARAMETERS: HCPCS | Performed by: INTERNAL MEDICINE

## 2021-01-21 PROCEDURE — 4004F PT TOBACCO SCREEN RCVD TLK: CPT | Performed by: INTERNAL MEDICINE

## 2021-01-21 PROCEDURE — 99213 OFFICE O/P EST LOW 20 MIN: CPT | Performed by: INTERNAL MEDICINE

## 2021-01-21 ASSESSMENT — ENCOUNTER SYMPTOMS
SHORTNESS OF BREATH: 0
CHEST TIGHTNESS: 0

## 2021-01-21 ASSESSMENT — PATIENT HEALTH QUESTIONNAIRE - PHQ9
SUM OF ALL RESPONSES TO PHQ QUESTIONS 1-9: 0
SUM OF ALL RESPONSES TO PHQ QUESTIONS 1-9: 0
2. FEELING DOWN, DEPRESSED OR HOPELESS: 0

## 2021-01-21 NOTE — PROGRESS NOTES
Brooke Valles (:  1960) is a 63 y.o. male,Established patient, here for evaluation of the following chief complaint(s):  Hypertension (f/u)      ASSESSMENT/PLAN:  1. Colon cancer metastasized to intra-abdominal lymph node [facility])  Comments:  doing well, has f/u with his oncologist 3 months  2. Neuropathy due to chemotherapeutic drug [facility])  Comments:  he is happy with gabapentin, continue present management  3. Essential hypertension  Comments:  at target, continue present mgmt  4. Smoker  Assessment & Plan:  Working on quitting. He is vaping, not interested in prescription meds. Counseled. Return in about 2 months (around 3/21/2021) for office visit, follow up, hypertension. SUBJECTIVE/OBJECTIVE:  HPI  Doing well. Still smoking 1 ppd    Neuropathy pain is under control. Review of Systems   Respiratory: Negative for chest tightness and shortness of breath. Cardiovascular: Negative for chest pain and leg swelling. Neurological: Negative for dizziness, syncope, light-headedness and headaches. Physical Exam  Vitals signs reviewed. Constitutional:       General: He is not in acute distress. Pulmonary:      Effort: No respiratory distress. Neurological:      Mental Status: He is alert. An electronic signature was used to authenticate this note.     --Kylie Lemons MD

## 2021-01-21 NOTE — PATIENT INSTRUCTIONS
Check your blood pressure in the morning and at bedtime at least 3 days per week and record the readings. Bring the machine and readings to your next visit.

## 2021-01-26 ENCOUNTER — HOSPITAL ENCOUNTER (OUTPATIENT)
Age: 61
Discharge: HOME | End: 2021-01-26
Payer: COMMERCIAL

## 2021-01-26 LAB
ALBUMIN SERPL-MCNC: 5 G/DL (ref 3.5–5.2)
ALP BLD-CCNC: 164 U/L (ref 40–129)
ALT SERPL-CCNC: 94 U/L (ref 0–40)
AST SERPL-CCNC: 197 U/L (ref 0–39)
BILIRUB SERPL-MCNC: 0.7 MG/DL (ref 0–1.2)
BILIRUBIN DIRECT: 0.3 MG/DL (ref 0–0.3)
BILIRUBIN, INDIRECT: 0.4 MG/DL (ref 0–1)
FERRITIN: 214 NG/ML
GAMMA GLUTAMYL TRANSFERASE: 510 U/L (ref 10–71)
IRON SATURATION: 39 % (ref 20–55)
IRON: 140 MCG/DL (ref 59–158)
LIPASE: 57 U/L (ref 13–60)
TOTAL IRON BINDING CAPACITY: 355 MCG/DL (ref 250–450)
TOTAL PROTEIN: 8.4 G/DL (ref 6.4–8.3)

## 2021-01-26 PROCEDURE — 83540 ASSAY OF IRON: CPT

## 2021-01-26 PROCEDURE — 82728 ASSAY OF FERRITIN: CPT

## 2021-01-26 PROCEDURE — 86255 FLUORESCENT ANTIBODY SCREEN: CPT

## 2021-01-26 PROCEDURE — 82977 ASSAY OF GGT: CPT

## 2021-01-26 PROCEDURE — 80074 ACUTE HEPATITIS PANEL: CPT

## 2021-01-26 PROCEDURE — 80076 HEPATIC FUNCTION PANEL: CPT

## 2021-01-26 PROCEDURE — 83690 ASSAY OF LIPASE: CPT

## 2021-01-26 PROCEDURE — 86038 ANTINUCLEAR ANTIBODIES: CPT

## 2021-01-26 PROCEDURE — 36415 COLL VENOUS BLD VENIPUNCTURE: CPT

## 2021-01-26 PROCEDURE — 83550 IRON BINDING TEST: CPT

## 2021-01-27 LAB
ANTI-MITOCHONDRIAL AB, IFA: NEGATIVE
ANTI-NUCLEAR ANTIBODY (ANA): NEGATIVE
HAV IGM SER IA-ACNC: NORMAL
HEPATITIS B CORE IGM ANTIBODY: NORMAL
HEPATITIS B SURFACE ANTIGEN INTERPRETATION: NORMAL
HEPATITIS C ANTIBODY INTERPRETATION: NORMAL

## 2021-01-29 LAB — SMOOTH MUSCLE ANTIBODY: NEGATIVE

## 2021-03-04 ENCOUNTER — HOSPITAL ENCOUNTER (OUTPATIENT)
Dept: INFUSION THERAPY | Age: 61
Discharge: HOME | End: 2021-03-04
Payer: COMMERCIAL

## 2021-03-04 DIAGNOSIS — C77.2 COLON CANCER METASTASIZED TO INTRA-ABDOMINAL LYMPH NODE (HCC): Primary | ICD-10-CM

## 2021-03-04 DIAGNOSIS — C18.9 COLON CANCER METASTASIZED TO INTRA-ABDOMINAL LYMPH NODE (HCC): Primary | ICD-10-CM

## 2021-03-04 PROCEDURE — 96523 IRRIG DRUG DELIVERY DEVICE: CPT

## 2021-03-04 PROCEDURE — 6360000002 HC RX W HCPCS: Performed by: INTERNAL MEDICINE

## 2021-03-04 PROCEDURE — 2580000003 HC RX 258: Performed by: INTERNAL MEDICINE

## 2021-03-04 RX ORDER — SODIUM CHLORIDE 0.9 % (FLUSH) 0.9 %
10 SYRINGE (ML) INJECTION PRN
Status: DISCONTINUED | OUTPATIENT
Start: 2021-03-04 | End: 2021-03-05 | Stop reason: HOSPADM

## 2021-03-04 RX ORDER — HEPARIN SODIUM (PORCINE) LOCK FLUSH IV SOLN 100 UNIT/ML 100 UNIT/ML
500 SOLUTION INTRAVENOUS PRN
Status: CANCELLED | OUTPATIENT
Start: 2021-03-04

## 2021-03-04 RX ORDER — HEPARIN SODIUM (PORCINE) LOCK FLUSH IV SOLN 100 UNIT/ML 100 UNIT/ML
500 SOLUTION INTRAVENOUS PRN
Status: DISCONTINUED | OUTPATIENT
Start: 2021-03-04 | End: 2021-03-05 | Stop reason: HOSPADM

## 2021-03-04 RX ORDER — SODIUM CHLORIDE 0.9 % (FLUSH) 0.9 %
10 SYRINGE (ML) INJECTION PRN
Status: CANCELLED | OUTPATIENT
Start: 2021-03-04

## 2021-03-04 RX ADMIN — Medication 500 UNITS: at 13:40

## 2021-03-04 RX ADMIN — SODIUM CHLORIDE, PRESERVATIVE FREE 10 ML: 5 INJECTION INTRAVENOUS at 13:37

## 2021-03-25 ENCOUNTER — OFFICE VISIT (OUTPATIENT)
Dept: FAMILY MEDICINE CLINIC | Age: 61
End: 2021-03-25
Payer: COMMERCIAL

## 2021-03-25 VITALS
OXYGEN SATURATION: 98 % | TEMPERATURE: 98.6 F | DIASTOLIC BLOOD PRESSURE: 70 MMHG | SYSTOLIC BLOOD PRESSURE: 121 MMHG | BODY MASS INDEX: 20.13 KG/M2 | RESPIRATION RATE: 16 BRPM | HEART RATE: 110 BPM | WEIGHT: 143.8 LBS | HEIGHT: 71 IN

## 2021-03-25 DIAGNOSIS — C18.9 COLON CANCER METASTASIZED TO INTRA-ABDOMINAL LYMPH NODE (HCC): ICD-10-CM

## 2021-03-25 DIAGNOSIS — G62.0 NEUROPATHY DUE TO CHEMOTHERAPEUTIC DRUG (HCC): ICD-10-CM

## 2021-03-25 DIAGNOSIS — T45.1X5A NEUROPATHY DUE TO CHEMOTHERAPEUTIC DRUG (HCC): ICD-10-CM

## 2021-03-25 DIAGNOSIS — Z78.9 ALCOHOL USE: ICD-10-CM

## 2021-03-25 DIAGNOSIS — C77.2 COLON CANCER METASTASIZED TO INTRA-ABDOMINAL LYMPH NODE (HCC): ICD-10-CM

## 2021-03-25 DIAGNOSIS — I10 ESSENTIAL HYPERTENSION: ICD-10-CM

## 2021-03-25 DIAGNOSIS — F17.200 SMOKER: Primary | ICD-10-CM

## 2021-03-25 DIAGNOSIS — R79.89 ABNORMAL LFTS: ICD-10-CM

## 2021-03-25 PROBLEM — F10.90 ALCOHOL USE: Status: ACTIVE | Noted: 2021-03-25

## 2021-03-25 PROCEDURE — G8420 CALC BMI NORM PARAMETERS: HCPCS | Performed by: INTERNAL MEDICINE

## 2021-03-25 PROCEDURE — G8427 DOCREV CUR MEDS BY ELIG CLIN: HCPCS | Performed by: INTERNAL MEDICINE

## 2021-03-25 PROCEDURE — 3017F COLORECTAL CA SCREEN DOC REV: CPT | Performed by: INTERNAL MEDICINE

## 2021-03-25 PROCEDURE — G8484 FLU IMMUNIZE NO ADMIN: HCPCS | Performed by: INTERNAL MEDICINE

## 2021-03-25 PROCEDURE — 99213 OFFICE O/P EST LOW 20 MIN: CPT | Performed by: INTERNAL MEDICINE

## 2021-03-25 PROCEDURE — 4004F PT TOBACCO SCREEN RCVD TLK: CPT | Performed by: INTERNAL MEDICINE

## 2021-03-25 RX ORDER — AMLODIPINE BESYLATE 5 MG/1
5 TABLET ORAL DAILY
Qty: 90 TABLET | Refills: 3 | Status: SHIPPED
Start: 2021-03-25 | End: 2021-06-29 | Stop reason: SDUPTHER

## 2021-03-25 SDOH — HEALTH STABILITY: MENTAL HEALTH: HOW MANY STANDARD DRINKS CONTAINING ALCOHOL DO YOU HAVE ON A TYPICAL DAY?: 5 OR 6

## 2021-03-25 SDOH — HEALTH STABILITY: MENTAL HEALTH: HOW OFTEN DO YOU HAVE A DRINK CONTAINING ALCOHOL?: 4 OR MORE TIMES A WEEK

## 2021-03-25 NOTE — PROGRESS NOTES
3/25/2021  Visit type: Established patient    Reason for Visit: Hypertension (f/u)      ASSESSMENT AND PLAN     1. Smoker  Assessment & Plan:  Patient was counseled on tobacco cessation. He does not want to use prescription medication but is willing to try otc nicotine gum. Provided educational document and video. 2. Colon cancer metastasized to intra-abdominal lymph node ([address]  3. Neuropathy due to chemotherapeutic drug [facility])  Assessment & Plan:  Stable- continue present management. 4. Essential hypertension  Assessment & Plan:  Stable- continue present management. Orders:  -     amLODIPine (NORVASC) 5 MG tablet; Take 1 tablet by mouth daily, Disp-90 tablet, R-3Normal  5. Abnormal LFTs  Assessment & Plan:   Counseled on alcohol use and advised to cut down. Requesting records from his gastroenterologist.   6. Alcohol use  Assessment & Plan:  As above       ----------------------------------------------------------------------    SUBJECTIVE    Chief Complaint   Patient presents with    Hypertension     f/u     HPI   No new complaints. Adherent with medications as listed. Neuropathy pain is mild with gabapentin. Still smoking. His daughter who had bad experience w/ Chantix. Review of Systems   Denies fever, chills, chest pain, SOB     OBJECTIVE    /70   Pulse 110   Temp 98.6 °F (37 °C) (Temporal)   Resp 16   Ht 5' 11\" (1.803 m)   Wt 143 lb 12.8 oz (65.2 kg)   SpO2 98%   BMI 20.06 kg/m²   Physical Exam  Vitals signs reviewed. Constitutional:       General: He is not in acute distress. Pulmonary:      Breath sounds: Normal breath sounds. No wheezing or rales. Neurological:      Mental Status: He is alert.         ---------------------------------------------------------------------------    DATA REVIEWED AND SUMMARIZED    Reviewed lab, imaging, and cardiology testing for last 2 years.          Gato Corbin MD

## 2021-03-25 NOTE — PATIENT INSTRUCTIONS
Patient Education        nicotine (gum, lozenge)  Pronunciation:  ALLISON villasenor teen  Brand:  Leader Nicotine Polacrilex, Nicorelief, Nicorette, Thrive  What is the most important information I should know about nicotine gum or lozenges? Follow all directions on your medicine label and package. Tell each of your healthcare providers about all your medical conditions, allergies, and all medicines you use. What is nicotine? Nicotine is the primary ingredient in tobacco products. Nicotine gum and lozenges are medical products used to aid in smoking cessation in adults. Using a controlled amount of nicotine helps reduce nicotine withdrawal symptoms when you quit smoking. Nicotine may also be used for purposes not listed in this medication guide. What should I discuss with my healthcare provider before using nicotine gum or lozenges? Ask a doctor or pharmacist if this medicine is safe to use if you have ever had:  · heart disease, irregular heartbeats;  · a heart attack or stroke;  · untreated or uncontrolled high blood pressure;  · diabetes;  · stomach ulcer;  · a seizure; or  · if you are on a low salt diet. Do not use this medicine if you are pregnant unless your doctor has told you to. Use effective birth control, and tell your doctor if you become pregnant during treatment. Smoking cigarettes during pregnancy can cause low birth weight, miscarriage, or stillbirth. Using a nicotine replacement product during pregnancy or while breast-feeding may be safer than smoking. However, you should try to stop smoking without using a nicotine replacement product if you are pregnant or breast-feeding. Talk with your doctor about the best way for you to stop smoking. It may not be safe to breastfeed while using this medicine. Ask your doctor about any risk. Nicotine lozenges may contain phenylalanine. Check the medication label if you have phenylketonuria (PKU).   Do not give this medicine to anyone under 25years old without medical advice. How should I take nicotine gum or lozenges? This medicine is only part of a complete program of treatment that may also include counseling, group support, and behavior changes. Your success will depend on your participation in all aspects of your smoking cessation program.  Use exactly as directed on the label, or as prescribed by your doctor. Start using nicotine gum or lozenges on the same day you stop (quit) smoking or using tobacco products. Your dose will depend on how many cigarettes you smoked daily before quitting. Read and carefully follow any Instructions for Use provided with your medicine. Ask your doctor or pharmacist if you do not understand these instructions. After removing the gum or lozenge, wrap it in paper and throw it away in a place where children and pets cannot reach it. Do not use this medicine for longer than 12 weeks without the advice of your doctor. Do not use more than one lozenge or piece of gum at a time. Do not use the gum and lozenges together at the same time. Store at room temperature away from moisture, heat, and light. Keep both used and unused gum and lozenges out of the reach of children or pets. What happens if I miss a dose? Since nicotine is used as needed, you are not likely to miss a dose. Do not use more than 20 lozenges or 24 pieces of gum per day. What happens if I overdose? Seek emergency medical attention or call the Poison Help line at 1-816.468.7921. The amount of nicotine in a used or unused lozenge or piece of gum can be fatal to a child who accidentally sucks or chews on it. Seek emergency medical attention if this happens. Overdose symptoms may include severe dizziness, nausea, vomiting, diarrhea, weakness, and fast heart rate. What should I avoid while using nicotine gum or lozenges? Do not eat or drink anything within 15 minutes before using the gum or lozenge or while the medicine is in your mouth.   What are the possible side effects of nicotine gum or lozenges? Get emergency medical help if you have signs of an allergic reaction: hives; difficult breathing; swelling of your face, lips, tongue, or throat. Stop using this medicine and call your doctor at once if you have:  · fast or pounding heartbeats, fluttering in your chest;  · blisters inside your mouth;  · problems with your teeth or jaw; or  · wheezing, tightness in your chest, trouble breathing. Common side effects may include:  · dizziness;  · dry mouth, upset stomach, burping, or hiccups;  · mouth or throat soreness;  · changes in taste; or  · headache. This is not a complete list of side effects and others may occur. Call your doctor for medical advice about side effects. You may report side effects to FDA at 7-624-FDA-2600. What other drugs will affect nicotine gum or lozenges? Ask a doctor or pharmacist before using nicotine gum or lozenges with any other medications, especially:  · an antidepressant;  · asthma medication; or  · any other smoking cessation medicine (bupropion, varenicline, Chantix, Zyban, Wellbutrin). This list is not complete. Other drugs may affect nicotine, including prescription and over-the-counter medicines, vitamins, and herbal products. Not all possible drug interactions are listed here. Where can I get more information? Your pharmacist can provide more information about nicotine gum or lozenges. Remember, keep this and all other medicines out of the reach of children, never share your medicines with others, and use this medication only for the indication prescribed. Every effort has been made to ensure that the information provided by [address] is accurate, up-to-date, and complete, but no guarantee is made to that effect. Drug information contained herein may be time sensitive.  [facility] information has been compiled for use by healthcare practitioners and consumers in the [employer] [employer] and therefore If you have questions about a medical condition or this instruction, always ask your healthcare professional. [place] [address] any warranty or liability for your use of this information.

## 2021-03-25 NOTE — ASSESSMENT & PLAN NOTE
Counseled on alcohol use and advised to cut down.    Requesting records from his gastroenterologist.

## 2021-03-25 NOTE — ASSESSMENT & PLAN NOTE
Patient was counseled on tobacco cessation. He does not want to use prescription medication but is willing to try otc nicotine gum. Provided educational document and video.

## 2021-04-19 ENCOUNTER — HOSPITAL ENCOUNTER (OUTPATIENT)
Dept: INFUSION THERAPY | Age: 61
Discharge: HOME | End: 2021-04-19
Payer: COMMERCIAL

## 2021-04-19 DIAGNOSIS — C77.2 COLON CANCER METASTASIZED TO INTRA-ABDOMINAL LYMPH NODE (HCC): Primary | ICD-10-CM

## 2021-04-19 DIAGNOSIS — C18.9 COLON CANCER METASTASIZED TO INTRA-ABDOMINAL LYMPH NODE (HCC): Primary | ICD-10-CM

## 2021-04-19 LAB
ALBUMIN SERPL-MCNC: 5.2 G/DL (ref 3.5–5.2)
ALP BLD-CCNC: 136 U/L (ref 40–129)
ALT SERPL-CCNC: 69 U/L (ref 0–40)
ANION GAP SERPL CALCULATED.3IONS-SCNC: 16 MMOL/L (ref 7–16)
AST SERPL-CCNC: 119 U/L (ref 0–39)
BASOPHILS ABSOLUTE: 0.07 E9/L (ref 0–0.2)
BASOPHILS RELATIVE PERCENT: 0.8 % (ref 0–2)
BILIRUB SERPL-MCNC: 1 MG/DL (ref 0–1.2)
BUN BLDV-MCNC: 10 MG/DL (ref 8–23)
CALCIUM SERPL-MCNC: 9.9 MG/DL (ref 8.6–10.2)
CEA: 8.2 NG/ML (ref 0–5.2)
CHLORIDE BLD-SCNC: 93 MMOL/L (ref 98–107)
CO2: 23 MMOL/L (ref 22–29)
CREAT SERPL-MCNC: 0.9 MG/DL (ref 0.7–1.2)
EOSINOPHILS ABSOLUTE: 0.06 E9/L (ref 0.05–0.5)
EOSINOPHILS RELATIVE PERCENT: 0.6 % (ref 0–6)
GFR AFRICAN AMERICAN: >60
GFR NON-AFRICAN AMERICAN: >60 ML/MIN/1.73
GLUCOSE BLD-MCNC: 80 MG/DL (ref 74–99)
HCT VFR BLD CALC: 41 % (ref 37–54)
HEMOGLOBIN: 13.7 G/DL (ref 12.5–16.5)
IMMATURE GRANULOCYTES #: 0.05 E9/L
IMMATURE GRANULOCYTES %: 0.5 % (ref 0–5)
LYMPHOCYTES ABSOLUTE: 1.14 E9/L (ref 1.5–4)
LYMPHOCYTES RELATIVE PERCENT: 12.3 % (ref 20–42)
MCH RBC QN AUTO: 32.6 PG (ref 26–35)
MCHC RBC AUTO-ENTMCNC: 33.4 % (ref 32–34.5)
MCV RBC AUTO: 97.6 FL (ref 80–99.9)
MONOCYTES ABSOLUTE: 0.96 E9/L (ref 0.1–0.95)
MONOCYTES RELATIVE PERCENT: 10.4 % (ref 2–12)
NEUTROPHILS ABSOLUTE: 6.98 E9/L (ref 1.8–7.3)
NEUTROPHILS RELATIVE PERCENT: 75.4 % (ref 43–80)
PDW BLD-RTO: 13.9 FL (ref 11.5–15)
PLATELET # BLD: 230 E9/L (ref 130–450)
PMV BLD AUTO: 8.6 FL (ref 7–12)
POTASSIUM SERPL-SCNC: 4.4 MMOL/L (ref 3.5–5)
RBC # BLD: 4.2 E12/L (ref 3.8–5.8)
SODIUM BLD-SCNC: 132 MMOL/L (ref 132–146)
TOTAL PROTEIN: 8.3 G/DL (ref 6.4–8.3)
WBC # BLD: 9.3 E9/L (ref 4.5–11.5)

## 2021-04-19 PROCEDURE — 36591 DRAW BLOOD OFF VENOUS DEVICE: CPT

## 2021-04-19 PROCEDURE — 85025 COMPLETE CBC W/AUTO DIFF WBC: CPT

## 2021-04-19 PROCEDURE — 6360000002 HC RX W HCPCS: Performed by: INTERNAL MEDICINE

## 2021-04-19 PROCEDURE — 80053 COMPREHEN METABOLIC PANEL: CPT

## 2021-04-19 PROCEDURE — 82378 CARCINOEMBRYONIC ANTIGEN: CPT

## 2021-04-19 PROCEDURE — 2580000003 HC RX 258: Performed by: INTERNAL MEDICINE

## 2021-04-19 RX ORDER — HEPARIN SODIUM (PORCINE) LOCK FLUSH IV SOLN 100 UNIT/ML 100 UNIT/ML
500 SOLUTION INTRAVENOUS PRN
Status: DISCONTINUED | OUTPATIENT
Start: 2021-04-19 | End: 2021-04-20 | Stop reason: HOSPADM

## 2021-04-19 RX ORDER — HEPARIN SODIUM (PORCINE) LOCK FLUSH IV SOLN 100 UNIT/ML 100 UNIT/ML
500 SOLUTION INTRAVENOUS PRN
Status: CANCELLED | OUTPATIENT
Start: 2021-04-19

## 2021-04-19 RX ORDER — SODIUM CHLORIDE 0.9 % (FLUSH) 0.9 %
10 SYRINGE (ML) INJECTION PRN
Status: CANCELLED | OUTPATIENT
Start: 2021-04-19

## 2021-04-19 RX ORDER — SODIUM CHLORIDE 0.9 % (FLUSH) 0.9 %
10 SYRINGE (ML) INJECTION PRN
Status: DISCONTINUED | OUTPATIENT
Start: 2021-04-19 | End: 2021-04-20 | Stop reason: HOSPADM

## 2021-04-19 RX ADMIN — SODIUM CHLORIDE, PRESERVATIVE FREE 10 ML: 5 INJECTION INTRAVENOUS at 13:54

## 2021-04-19 RX ADMIN — Medication 500 UNITS: at 13:55

## 2021-04-20 ENCOUNTER — OFFICE VISIT (OUTPATIENT)
Dept: ONCOLOGY | Age: 61
End: 2021-04-20
Payer: COMMERCIAL

## 2021-04-20 VITALS
WEIGHT: 148.5 LBS | HEIGHT: 71 IN | BODY MASS INDEX: 20.79 KG/M2 | OXYGEN SATURATION: 98 % | HEART RATE: 85 BPM | DIASTOLIC BLOOD PRESSURE: 78 MMHG | SYSTOLIC BLOOD PRESSURE: 138 MMHG | TEMPERATURE: 97.8 F

## 2021-04-20 DIAGNOSIS — C18.9 COLON CANCER METASTASIZED TO INTRA-ABDOMINAL LYMPH NODE (HCC): Primary | ICD-10-CM

## 2021-04-20 DIAGNOSIS — C77.2 COLON CANCER METASTASIZED TO INTRA-ABDOMINAL LYMPH NODE (HCC): Primary | ICD-10-CM

## 2021-04-20 PROCEDURE — 99213 OFFICE O/P EST LOW 20 MIN: CPT

## 2021-04-20 NOTE — PROGRESS NOTES
[address]  [address], [facility]   Hematology/Oncology  Consult      Patient Name: Tish Irby  YOB: 1960  PCP: Jefry Perry MD   Referring Provider: [address] [place] [address] / [facility] [id number]     Reason for Consultation:   Chief Complaint   Patient presents with    Colon Cancer        Subjective:  Feels well today, no issues in interim. BMs unchanged, no bleeding, no weight loss. Still smoking 1 ppd    History of Present Illness: This pt is a pleasant 64 yo male who was diagnosed with colon cancer in 11/19 (reportedly stage IIIB), s/p resection and 2/31 LNs positive, and reportedly received adj chemotherapy and has chronic neuropathy from this. No records are available for review today, however he states he received 8 cycles of full dose FOLFOX and 4 cycles of DR FOLFOX. He still has his PORT. He has no acute complaints today, including abdominal pain, change in bowel habits, pain with defecation, tenesmus, hematochezia or melena. He has maintained a stable weight.  He overall feels well    Diagnostic Data:     Past Medical History:   Diagnosis Date    Cataract     bilateral    Colon cancer ([address]     Hypertension     Neuropathy     finger/feet from chemotherapy       Patient Active Problem List    Diagnosis Date Noted    Abnormal LFTs 03/25/2021    Alcohol use 03/25/2021    Colon cancer metastasized to intra-abdominal lymph node ([address] 12/17/2020    Neuropathy due to chemotherapeutic drug ([address] 12/17/2020    Essential hypertension 12/17/2020    Smoker 12/17/2020        Past Surgical History:   Procedure Laterality Date    COLON SURGERY  2019    EYE SURGERY Bilateral     SUBTOTAL COLECTOMY  11/21/2019       Family History  Family History   Problem Relation Age of Onset    Stroke Mother     High Blood Pressure Mother     Heart Disease Father     Heart Attack Father     Kidney Disease Sister     Cancer Brother         Prostate    No Known Problems Maternal Aunt     No Known Problems Maternal Uncle     No Known Problems Paternal Aunt     No Known Problems Paternal Uncle     No Known Problems Maternal Grandmother     No Known Problems Maternal Grandfather     No Known Problems Paternal Grandmother     No Known Problems Paternal Grandfather     No Known Problems Maternal Cousin     No Known Problems Paternal Cousin     No Known Problems Brother     Anxiety Disorder Daughter     No Known Problems Grandchild        Social History    TOBACCO:   reports that he has been smoking cigarettes. He started smoking about 43 years ago. He has a 40.00 pack-year smoking history. He has never used smokeless tobacco.  ETOH:   reports current alcohol use of about 70.0 standard drinks of alcohol per week. Home Medications  Prior to Admission medications    Medication Sig Start Date End Date Taking? Authorizing Provider   amLODIPine (NORVASC) 5 MG tablet Take 1 tablet by mouth daily 3/25/21  Yes Seth Elizabeth MD   Multiple Vitamins-Minerals (MULTIVITAMIN GUMMIES MENS) CHEW Take 1 tablet by mouth daily as needed   Yes Historical Provider, MD   gabapentin (NEURONTIN) 100 MG capsule Take 1 capsule by mouth 3 times daily for 180 days. 12/17/20 6/15/21 Yes Seth Elizabeth MD       Allergies  No Known Allergies    Review of Systems:    + for neuropathy of BL U/LE      Objective  /78   Pulse 85   Temp 97.8 °F (36.6 °C)   Ht 5' 11\" (1.803 m)   Wt 148 lb 8 oz (67.4 kg)   SpO2 98%   BMI 20.71 kg/m²     Physical Performance Status    Physical Exam:  General: AAO to person, place, time, and purpose, appears stated age, cooperative, no acute distress, pleasant   Head and neck : PERRLA, EOMI . Sclera non icteric.   Oropharynx : Clear  Neck: no JVD,  no adenopathy  LYMPHATICS : No LAD  Lungs: Clear to auscultation   Heart: Regular rate and regular rhythm, no murmur, normal S1, S2  Abdomen: Soft, non-tender;no masses, no organomegaly  Extremities: No edema,no cyanosis, no clubbing. Skin:  No rash. Neurologic:Cranial nerves grossly intact. No focal motor or sensory deficits . Recent Laboratory Data-   Lab Results   Component Value Date    WBC 9.3 04/19/2021    HGB 13.7 04/19/2021    HCT 41.0 04/19/2021    MCV 97.6 04/19/2021     04/19/2021    LYMPHOPCT 12.3 (L) 04/19/2021    RBC 4.20 04/19/2021    MCH 32.6 04/19/2021    MCHC 33.4 04/19/2021    RDW 13.9 04/19/2021    NEUTOPHILPCT 75.4 04/19/2021    MONOPCT 10.4 04/19/2021    BASOPCT 0.8 04/19/2021    NEUTROABS 6.98 04/19/2021    LYMPHSABS 1.14 (L) 04/19/2021    MONOSABS 0.96 (H) 04/19/2021    EOSABS 0.06 04/19/2021    BASOSABS 0.07 04/19/2021       Lab Results   Component Value Date     04/19/2021    K 4.4 04/19/2021    CL 93 (L) 04/19/2021    CO2 23 04/19/2021    BUN 10 04/19/2021    CREATININE 0.9 04/19/2021    GLUCOSE 80 04/19/2021    CALCIUM 9.9 04/19/2021    PROT 8.3 04/19/2021    LABALBU 5.2 04/19/2021    BILITOT 1.0 04/19/2021    ALKPHOS 136 (H) 04/19/2021     (H) 04/19/2021    ALT 69 (H) 04/19/2021    LABGLOM >60 04/19/2021    GFRAA >60 04/19/2021       Lab Results   Component Value Date    IRON 140 01/26/2021    TIBC 355 01/26/2021    FERRITIN 214 01/26/2021           Radiology-    No results found. ASSESSMENT/PLAN :    Neena Stewart was seen today for colon cancer.     Diagnoses and all orders for this visit:    Colon cancer metastasized to intra-abdominal lymph node ([facility]    62 yo male  Reportedly history of stage IIIB colon cancer  S/p resection and adj FOLFOX x 12 with some DR at the end  Iatrogenic neuropathy    - Will request records from Dr. Epi Barker including pathology (reported 2/31)  - He is due for CT A/P and this is ordered today  - Will need referral at next visit for colonoscopy  - CBC/CMP/CEA today  - RTC in 1 months to review results, then q 3 months with port flush q ~6 weeks    1/19/21  - Stage III B colon cancer s/p resection and 12 cycles adj FOLFOX  - CT A/P with no evidence of recurrence of metastatic disease. Diffuse hepatic steatosis was noted  - CBC and CMP was WNL with the exception of elevated LFTs, likely due to the steatosis  - CEA 7.4, will trend  - Refer to Dr. Laura Castillo for colonoscopy surveillance  - PORT flush q 6 weeks  - RTC in 3 months with repeat labs    4/20/21  - Stage III B colon cancer s/p resection and 12 cycles adj FOLFOX  - CT A/P as above  - CBC and CMP was WNL with the exception of elevated LFTs (though improved from prior), likely due to the steatosis  - CEA up from 7.4 to 8.2.  Still smoking 1 ppd, will work on quitting   - Referred to Dr. Laura Castillo for colonoscopy surveillance  - PORT flush q 6 weeks  - RTC in 3 months with repeat labs at [facility], [place]   Electronically signed 4/20/2021 at 1:30 PM

## 2021-04-30 ENCOUNTER — HOSPITAL ENCOUNTER (OUTPATIENT)
Dept: CT IMAGING | Age: 61
Discharge: HOME | End: 2021-04-30
Payer: COMMERCIAL

## 2021-04-30 DIAGNOSIS — C18.9 COLON CANCER METASTASIZED TO INTRA-ABDOMINAL LYMPH NODE (HCC): ICD-10-CM

## 2021-04-30 DIAGNOSIS — C77.2 COLON CANCER METASTASIZED TO INTRA-ABDOMINAL LYMPH NODE (HCC): ICD-10-CM

## 2021-04-30 PROCEDURE — 74177 CT ABD & PELVIS W/CONTRAST: CPT

## 2021-04-30 PROCEDURE — 6360000004 HC RX CONTRAST MEDICATION: Performed by: RADIOLOGY

## 2021-04-30 RX ADMIN — IOPAMIDOL 75 ML: 755 INJECTION, SOLUTION INTRAVENOUS at 08:00

## 2021-05-18 ENCOUNTER — HOSPITAL ENCOUNTER (OUTPATIENT)
Dept: INFUSION THERAPY | Age: 61
Discharge: HOME | End: 2021-05-18
Payer: COMMERCIAL

## 2021-05-18 DIAGNOSIS — C77.2 COLON CANCER METASTASIZED TO INTRA-ABDOMINAL LYMPH NODE (HCC): Primary | ICD-10-CM

## 2021-05-18 DIAGNOSIS — C18.9 COLON CANCER METASTASIZED TO INTRA-ABDOMINAL LYMPH NODE (HCC): Primary | ICD-10-CM

## 2021-05-18 PROCEDURE — 6360000002 HC RX W HCPCS: Performed by: INTERNAL MEDICINE

## 2021-05-18 PROCEDURE — 2580000003 HC RX 258: Performed by: INTERNAL MEDICINE

## 2021-05-18 PROCEDURE — 96523 IRRIG DRUG DELIVERY DEVICE: CPT

## 2021-05-18 PROCEDURE — 36591 DRAW BLOOD OFF VENOUS DEVICE: CPT

## 2021-05-18 RX ORDER — HEPARIN SODIUM (PORCINE) LOCK FLUSH IV SOLN 100 UNIT/ML 100 UNIT/ML
500 SOLUTION INTRAVENOUS PRN
Status: DISCONTINUED | OUTPATIENT
Start: 2021-05-18 | End: 2021-05-19 | Stop reason: HOSPADM

## 2021-05-18 RX ORDER — HEPARIN SODIUM (PORCINE) LOCK FLUSH IV SOLN 100 UNIT/ML 100 UNIT/ML
500 SOLUTION INTRAVENOUS PRN
Status: CANCELLED | OUTPATIENT
Start: 2021-05-18

## 2021-05-18 RX ORDER — SODIUM CHLORIDE 0.9 % (FLUSH) 0.9 %
10 SYRINGE (ML) INJECTION PRN
Status: DISCONTINUED | OUTPATIENT
Start: 2021-05-18 | End: 2021-05-19 | Stop reason: HOSPADM

## 2021-05-18 RX ORDER — SODIUM CHLORIDE 0.9 % (FLUSH) 0.9 %
10 SYRINGE (ML) INJECTION PRN
Status: CANCELLED | OUTPATIENT
Start: 2021-05-18

## 2021-05-18 RX ADMIN — SODIUM CHLORIDE, PRESERVATIVE FREE 10 ML: 5 INJECTION INTRAVENOUS at 13:39

## 2021-05-18 RX ADMIN — Medication 500 UNITS: at 13:39

## 2021-05-18 NOTE — PROGRESS NOTES
Patient presents to clinic for [facility] today. Left chest  SQ port accessed per policy using 20 G .75 inch Wagner needle for good blood return. Site flushed easily with 10 mL NSS followed by 5 mL Heparin solution 100 units/ml rinse prior to de-access. Dry sterile dressing to area. Tolerated procedure well. Encouraged to schedule port flush every 4 weeks.

## 2021-06-28 DIAGNOSIS — C18.9 COLON CANCER METASTASIZED TO INTRA-ABDOMINAL LYMPH NODE (HCC): Primary | ICD-10-CM

## 2021-06-28 DIAGNOSIS — C77.2 COLON CANCER METASTASIZED TO INTRA-ABDOMINAL LYMPH NODE (HCC): Primary | ICD-10-CM

## 2021-06-29 ENCOUNTER — HOSPITAL ENCOUNTER (OUTPATIENT)
Dept: INFUSION THERAPY | Age: 61
Discharge: HOME | End: 2021-06-29
Payer: COMMERCIAL

## 2021-06-29 ENCOUNTER — OFFICE VISIT (OUTPATIENT)
Dept: FAMILY MEDICINE CLINIC | Age: 61
End: 2021-06-29
Payer: COMMERCIAL

## 2021-06-29 VITALS
OXYGEN SATURATION: 98 % | SYSTOLIC BLOOD PRESSURE: 144 MMHG | TEMPERATURE: 97.7 F | WEIGHT: 142 LBS | HEART RATE: 108 BPM | HEIGHT: 71 IN | RESPIRATION RATE: 16 BRPM | BODY MASS INDEX: 19.88 KG/M2 | DIASTOLIC BLOOD PRESSURE: 86 MMHG

## 2021-06-29 DIAGNOSIS — C77.2 COLON CANCER METASTASIZED TO INTRA-ABDOMINAL LYMPH NODE (HCC): Primary | ICD-10-CM

## 2021-06-29 DIAGNOSIS — C18.9 COLON CANCER METASTASIZED TO INTRA-ABDOMINAL LYMPH NODE (HCC): Primary | ICD-10-CM

## 2021-06-29 DIAGNOSIS — G62.0 NEUROPATHY DUE TO CHEMOTHERAPEUTIC DRUG (HCC): ICD-10-CM

## 2021-06-29 DIAGNOSIS — T45.1X5A NEUROPATHY DUE TO CHEMOTHERAPEUTIC DRUG (HCC): ICD-10-CM

## 2021-06-29 DIAGNOSIS — I10 ESSENTIAL HYPERTENSION: ICD-10-CM

## 2021-06-29 LAB
ALBUMIN SERPL-MCNC: 4.7 G/DL (ref 3.5–5.2)
ALP BLD-CCNC: 110 U/L (ref 40–129)
ALT SERPL-CCNC: 30 U/L (ref 0–40)
ANION GAP SERPL CALCULATED.3IONS-SCNC: 13 MMOL/L (ref 7–16)
AST SERPL-CCNC: 62 U/L (ref 0–39)
BASOPHILS ABSOLUTE: 0.05 E9/L (ref 0–0.2)
BASOPHILS RELATIVE PERCENT: 0.6 % (ref 0–2)
BILIRUB SERPL-MCNC: 0.7 MG/DL (ref 0–1.2)
BUN BLDV-MCNC: 10 MG/DL (ref 6–23)
CALCIUM SERPL-MCNC: 10.1 MG/DL (ref 8.6–10.2)
CEA: 7 NG/ML (ref 0–5.2)
CHLORIDE BLD-SCNC: 98 MMOL/L (ref 98–107)
CO2: 25 MMOL/L (ref 22–29)
CREAT SERPL-MCNC: 1.1 MG/DL (ref 0.7–1.2)
EOSINOPHILS ABSOLUTE: 0.09 E9/L (ref 0.05–0.5)
EOSINOPHILS RELATIVE PERCENT: 1 % (ref 0–6)
GFR AFRICAN AMERICAN: >60
GFR NON-AFRICAN AMERICAN: >60 ML/MIN/1.73
GLUCOSE BLD-MCNC: 204 MG/DL (ref 74–99)
HCT VFR BLD CALC: 40.3 % (ref 37–54)
HEMOGLOBIN: 14 G/DL (ref 12.5–16.5)
IMMATURE GRANULOCYTES #: 0.09 E9/L
IMMATURE GRANULOCYTES %: 1 % (ref 0–5)
LYMPHOCYTES ABSOLUTE: 1.18 E9/L (ref 1.5–4)
LYMPHOCYTES RELATIVE PERCENT: 13.7 % (ref 20–42)
MCH RBC QN AUTO: 33.7 PG (ref 26–35)
MCHC RBC AUTO-ENTMCNC: 34.7 % (ref 32–34.5)
MCV RBC AUTO: 96.9 FL (ref 80–99.9)
MONOCYTES ABSOLUTE: 0.77 E9/L (ref 0.1–0.95)
MONOCYTES RELATIVE PERCENT: 8.9 % (ref 2–12)
NEUTROPHILS ABSOLUTE: 6.44 E9/L (ref 1.8–7.3)
NEUTROPHILS RELATIVE PERCENT: 74.8 % (ref 43–80)
PDW BLD-RTO: 13.4 FL (ref 11.5–15)
PLATELET # BLD: 262 E9/L (ref 130–450)
PMV BLD AUTO: 8.7 FL (ref 7–12)
POTASSIUM SERPL-SCNC: 4 MMOL/L (ref 3.5–5)
RBC # BLD: 4.16 E12/L (ref 3.8–5.8)
SODIUM BLD-SCNC: 136 MMOL/L (ref 132–146)
TOTAL PROTEIN: 8.1 G/DL (ref 6.4–8.3)
WBC # BLD: 8.6 E9/L (ref 4.5–11.5)

## 2021-06-29 PROCEDURE — G8420 CALC BMI NORM PARAMETERS: HCPCS | Performed by: INTERNAL MEDICINE

## 2021-06-29 PROCEDURE — 4004F PT TOBACCO SCREEN RCVD TLK: CPT | Performed by: INTERNAL MEDICINE

## 2021-06-29 PROCEDURE — 6360000002 HC RX W HCPCS: Performed by: INTERNAL MEDICINE

## 2021-06-29 PROCEDURE — G8427 DOCREV CUR MEDS BY ELIG CLIN: HCPCS | Performed by: INTERNAL MEDICINE

## 2021-06-29 PROCEDURE — 2580000003 HC RX 258: Performed by: INTERNAL MEDICINE

## 2021-06-29 PROCEDURE — 82378 CARCINOEMBRYONIC ANTIGEN: CPT

## 2021-06-29 PROCEDURE — 3017F COLORECTAL CA SCREEN DOC REV: CPT | Performed by: INTERNAL MEDICINE

## 2021-06-29 PROCEDURE — 80053 COMPREHEN METABOLIC PANEL: CPT

## 2021-06-29 PROCEDURE — 99214 OFFICE O/P EST MOD 30 MIN: CPT | Performed by: INTERNAL MEDICINE

## 2021-06-29 PROCEDURE — 85025 COMPLETE CBC W/AUTO DIFF WBC: CPT

## 2021-06-29 PROCEDURE — 36591 DRAW BLOOD OFF VENOUS DEVICE: CPT

## 2021-06-29 RX ORDER — AMLODIPINE BESYLATE 10 MG/1
10 TABLET ORAL DAILY
Qty: 90 TABLET | Refills: 3 | Status: SHIPPED
Start: 2021-06-29 | End: 2022-06-20

## 2021-06-29 RX ORDER — GABAPENTIN 100 MG/1
100 CAPSULE ORAL 3 TIMES DAILY
Qty: 270 CAPSULE | Refills: 3 | Status: SHIPPED
Start: 2021-06-29 | End: 2022-06-20

## 2021-06-29 RX ORDER — SODIUM CHLORIDE 0.9 % (FLUSH) 0.9 %
10 SYRINGE (ML) INJECTION PRN
Status: CANCELLED | OUTPATIENT
Start: 2021-06-29

## 2021-06-29 RX ORDER — SODIUM CHLORIDE 0.9 % (FLUSH) 0.9 %
10 SYRINGE (ML) INJECTION PRN
Status: DISCONTINUED | OUTPATIENT
Start: 2021-06-29 | End: 2021-06-30 | Stop reason: HOSPADM

## 2021-06-29 RX ORDER — HEPARIN SODIUM (PORCINE) LOCK FLUSH IV SOLN 100 UNIT/ML 100 UNIT/ML
500 SOLUTION INTRAVENOUS PRN
Status: DISCONTINUED | OUTPATIENT
Start: 2021-06-29 | End: 2021-06-30 | Stop reason: HOSPADM

## 2021-06-29 RX ORDER — HEPARIN SODIUM (PORCINE) LOCK FLUSH IV SOLN 100 UNIT/ML 100 UNIT/ML
500 SOLUTION INTRAVENOUS PRN
Status: CANCELLED | OUTPATIENT
Start: 2021-06-29

## 2021-06-29 RX ADMIN — SODIUM CHLORIDE, PRESERVATIVE FREE 10 ML: 5 INJECTION INTRAVENOUS at 13:26

## 2021-06-29 RX ADMIN — Medication 500 UNITS: at 13:26

## 2021-06-29 NOTE — PROGRESS NOTES
Patient presents to clinic for labs today. Left chest  SQ port accessed per policy using 20 G .75 inch Wagner needle for good blood return. Aspirate for waste and specimen sent to lab. Site flushed easily with 10 mL NSS followed by 5 mL Heparin solution 100 units/ml rinse prior to de-access. Dry sterile dressing to area. Tolerated procedure well. Encouraged to schedule port flush every 4 weeks.

## 2021-06-30 NOTE — PROGRESS NOTES
6/29/2021  Visit type: Established patient    Reason for Visit: Hypertension and Medication Refill      ASSESSMENT AND PLAN     1. Neuropathy due to chemotherapeutic drug (HCC)  Stable, continue:  -     gabapentin (NEURONTIN) 100 MG capsule; Take 1 capsule by mouth 3 times daily for 360 days. , Disp-270 capsule, R-3Normal  2. Essential hypertension  BP not at target, increase:  -     amLODIPine (NORVASC) 10 MG tablet; Take 1 tablet by mouth daily, Counseled on nutrition and exercise. Return in about 3 months (around 9/29/2021) for office visit, follow up, hypertension.     ----------------------------------------------------------------------    SUBJECTIVE    Chief Complaint   Patient presents with    Hypertension    Medication Refill     HPI   No new complaints. Neuropathy pain is controlled. Review of Systems   Denies fever, chills, chest pain, and shortness of breath, lightheadedness. OBJECTIVE    BP (!) 144/86   Pulse 108   Temp 97.7 °F (36.5 °C) (Temporal)   Resp 16   Ht 5' 11\" (1.803 m)   Wt 142 lb (64.4 kg)   SpO2 98%   BMI 19.80 kg/m²   Physical Exam  Constitutional:       General: He is not in acute distress. HENT:      Mouth/Throat:      Pharynx: Oropharynx is clear. Cardiovascular:      Rate and Rhythm: Normal rate and regular rhythm. Heart sounds: Normal heart sounds. No murmur heard. No friction rub. No gallop. Pulmonary:      Breath sounds: Normal breath sounds. No stridor. No wheezing, rhonchi or rales. Musculoskeletal:      Right lower leg: No edema. Left lower leg: No edema. Neurological:      General: No focal deficit present.       Mental Status: He is alert.           ---------------------------------------------------------------------------    Data reviewed and summarized-    Reviewed   Labs CMP CEA FERRITIN SMA  Imaging CT ab  Notes onc,         Akhil Brooks MD

## 2021-07-13 ENCOUNTER — HOSPITAL ENCOUNTER (OUTPATIENT)
Dept: INFUSION THERAPY | Age: 61
Discharge: HOME | End: 2021-07-13
Payer: COMMERCIAL

## 2021-07-13 ENCOUNTER — OFFICE VISIT (OUTPATIENT)
Dept: ONCOLOGY | Age: 61
End: 2021-07-13
Payer: COMMERCIAL

## 2021-07-13 VITALS
SYSTOLIC BLOOD PRESSURE: 138 MMHG | RESPIRATION RATE: 18 BRPM | OXYGEN SATURATION: 98 % | TEMPERATURE: 97.8 F | DIASTOLIC BLOOD PRESSURE: 76 MMHG | HEART RATE: 100 BPM | BODY MASS INDEX: 20.22 KG/M2 | HEIGHT: 71 IN | WEIGHT: 144.4 LBS

## 2021-07-13 DIAGNOSIS — C18.9 COLON CANCER METASTASIZED TO INTRA-ABDOMINAL LYMPH NODE (HCC): Primary | ICD-10-CM

## 2021-07-13 DIAGNOSIS — C77.2 COLON CANCER METASTASIZED TO INTRA-ABDOMINAL LYMPH NODE (HCC): Primary | ICD-10-CM

## 2021-07-13 PROCEDURE — 99212 OFFICE O/P EST SF 10 MIN: CPT

## 2021-07-13 RX ORDER — SODIUM CHLORIDE 0.9 % (FLUSH) 0.9 %
10 SYRINGE (ML) INJECTION PRN
Status: DISCONTINUED | OUTPATIENT
Start: 2021-07-13 | End: 2021-07-14 | Stop reason: HOSPADM

## 2021-07-13 RX ORDER — SODIUM CHLORIDE 0.9 % (FLUSH) 0.9 %
10 SYRINGE (ML) INJECTION PRN
Status: CANCELLED | OUTPATIENT
Start: 2021-07-13

## 2021-07-13 RX ORDER — HEPARIN SODIUM (PORCINE) LOCK FLUSH IV SOLN 100 UNIT/ML 100 UNIT/ML
500 SOLUTION INTRAVENOUS PRN
Status: DISCONTINUED | OUTPATIENT
Start: 2021-07-13 | End: 2021-07-14 | Stop reason: HOSPADM

## 2021-07-13 RX ORDER — HEPARIN SODIUM (PORCINE) LOCK FLUSH IV SOLN 100 UNIT/ML 100 UNIT/ML
500 SOLUTION INTRAVENOUS PRN
Status: CANCELLED | OUTPATIENT
Start: 2021-07-13

## 2021-07-13 NOTE — PROGRESS NOTES
[address]  [address] [place] [address]   Hematology/Oncology  Consult      Patient Name: Hardik Christy  YOB: 1960  PCP: Shantell Boss MD   Referring Provider:      Reason for Consultation:   No chief complaint on file. Subjective:  Feels well today, no issues in interim. BMs unchanged, no bleeding, no weight loss. Neuropathy unchanged. Still smoking 1 ppd    History of Present Illness: This pt is a pleasant 64 yo male who was diagnosed with colon cancer in 11/19 (reportedly stage IIIB), s/p resection and 2/31 LNs positive, and reportedly received adj chemotherapy and has chronic neuropathy from this. No records are available for review today, however he states he received 8 cycles of full dose FOLFOX and 4 cycles of DR FOLFOX. He still has his PORT. He has no acute complaints today, including abdominal pain, change in bowel habits, pain with defecation, tenesmus, hematochezia or melena. He has maintained a stable weight.  He overall feels well    Diagnostic Data:     Past Medical History:   Diagnosis Date    Cataract     bilateral    Colon cancer ([address]     Hypertension     Neuropathy     finger/feet from chemotherapy       Patient Active Problem List    Diagnosis Date Noted    Abnormal LFTs 03/25/2021    Alcohol use 03/25/2021    Colon cancer metastasized to intra-abdominal lymph node ([address] 12/17/2020    Neuropathy due to chemotherapeutic drug ([address] 12/17/2020    Essential hypertension 12/17/2020    Smoker 12/17/2020        Past Surgical History:   Procedure Laterality Date    COLON SURGERY  2019    EYE SURGERY Bilateral     SUBTOTAL COLECTOMY  11/21/2019       Family History  Family History   Problem Relation Age of Onset    Stroke Mother     High Blood Pressure Mother     Heart Disease Father     Heart Attack Father     Kidney Disease Sister     Cancer Brother         Prostate    No Known Problems Maternal Aunt     No Known Problems Maternal Uncle     No Known Problems Paternal Aunt     No Known Problems Paternal Uncle     No Known Problems Maternal Grandmother     No Known Problems Maternal Grandfather     No Known Problems Paternal Grandmother     No Known Problems Paternal Grandfather     No Known Problems Maternal Cousin     No Known Problems Paternal Cousin     No Known Problems Brother     Anxiety Disorder Daughter     No Known Problems Grandchild        Social History    TOBACCO:   reports that he has been smoking cigarettes. He started smoking about 43 years ago. He has a 40.00 pack-year smoking history. He has never used smokeless tobacco.  ETOH:   reports current alcohol use of about 70.0 standard drinks of alcohol per week. Home Medications  Prior to Admission medications    Medication Sig Start Date End Date Taking? Authorizing Provider   gabapentin (NEURONTIN) 100 MG capsule Take 1 capsule by mouth 3 times daily for 360 days. 6/29/21 6/24/22  Cassidy Barr MD   amLODIPine (NORVASC) 10 MG tablet Take 1 tablet by mouth daily 6/29/21   Cassidy Barr MD   Multiple Vitamins-Minerals (MULTIVITAMIN GUMMIES MENS) CHEW Take 1 tablet by mouth daily as needed    Historical Provider, MD       Allergies  No Known Allergies    Review of Systems:    + for neuropathy of BL U/LE      Objective  There were no vitals taken for this visit. Physical Performance Status    Physical Exam:  General: AAO to person, place, time, and purpose, appears stated age, cooperative, no acute distress, pleasant   Head and neck : PERRLA, EOMI . Sclera non icteric. Oropharynx : Clear  Neck: no JVD,  no adenopathy  LYMPHATICS : No LAD  Lungs: Clear to auscultation   Heart: Regular rate and regular rhythm, no murmur, normal S1, S2  Abdomen: Soft, non-tender;no masses, no organomegaly  Extremities: No edema,no cyanosis, no clubbing. Skin:  No rash. Neurologic:Cranial nerves grossly intact. No focal motor or sensory deficits .     Recent Laboratory Data-   Lab Results   Component Value Date    WBC 8.6 06/29/2021    HGB 14.0 06/29/2021    HCT 40.3 06/29/2021    MCV 96.9 06/29/2021     06/29/2021    LYMPHOPCT 13.7 (L) 06/29/2021    RBC 4.16 06/29/2021    MCH 33.7 06/29/2021    MCHC 34.7 (H) 06/29/2021    RDW 13.4 06/29/2021    NEUTOPHILPCT 74.8 06/29/2021    MONOPCT 8.9 06/29/2021    BASOPCT 0.6 06/29/2021    NEUTROABS 6.44 06/29/2021    LYMPHSABS 1.18 (L) 06/29/2021    MONOSABS 0.77 06/29/2021    EOSABS 0.09 06/29/2021    BASOSABS 0.05 06/29/2021       Lab Results   Component Value Date     06/29/2021    K 4.0 06/29/2021    CL 98 06/29/2021    CO2 25 06/29/2021    BUN 10 06/29/2021    CREATININE 1.1 06/29/2021    GLUCOSE 204 (H) 06/29/2021    CALCIUM 10.1 06/29/2021    PROT 8.1 06/29/2021    LABALBU 4.7 06/29/2021    BILITOT 0.7 06/29/2021    ALKPHOS 110 06/29/2021    AST 62 (H) 06/29/2021    ALT 30 06/29/2021    LABGLOM >60 06/29/2021    GFRAA >60 06/29/2021       Lab Results   Component Value Date    IRON 140 01/26/2021    TIBC 355 01/26/2021    FERRITIN 214 01/26/2021           Radiology-    No results found. ASSESSMENT/PLAN :    There are no diagnoses linked to this encounter. 64 yo male  Reportedly history of stage IIIB colon cancer  S/p resection and adj FOLFOX x 12 with some DR at the end  Iatrogenic neuropathy    - Will request records from Dr. Violetta Cantu including pathology (reported 2/31 LNs+)  - He is due for CT A/P and this is ordered today  - Will need referral at next visit for colonoscopy  - CBC/CMP/CEA today  - RTC in 1 months to review results, then q 3 months with port flush q ~6 weeks    1/19/21  - Stage III B colon cancer s/p resection and 12 cycles adj FOLFOX  - CT A/P with no evidence of recurrence of metastatic disease.  Diffuse hepatic steatosis was noted  - CBC and CMP was WNL with the exception of elevated LFTs, likely due to the steatosis  - CEA 7.4, will trend  - Refer to Dr. Kailyn Irby for colonoscopy surveillance  - PORT flush q 6 weeks  - RTC in 3 months with repeat labs    4/20/21  - Stage III B colon cancer s/p resection and 12 cycles adj FOLFOX  - CT A/P as above  - CBC and CMP was WNL with the exception of elevated LFTs (though improved from prior), likely due to the steatosis  - CEA up from 7.4 to 8.2. Still smoking 1 ppd, will work on quitting   - Referred to Dr. Deangelo Perry for colonoscopy surveillance  - PORT flush q 6 weeks  - RTC in 3 months with repeat labs at CT A/P    7/13/21  - Stage III B colon cancer s/p resection and 12 cycles adj FOLFOX (6/20)  - CT A/P negative for recurrence, hepatic steatosis noted  - CBC and CMP was WNL. Elevated LFTs further improved, likely due to the steatosis  - CEA down to 7.0.  Still smoking 1 ppd, discussed cessation again  - Dr. Deangelo Perry for colonoscopy surveillance  - PORT flush q 6 weeks  - RTC in 3 months      Mikhail Ulloa MD   Electronically signed 7/13/2021 at 1:18 PM

## 2021-08-26 ENCOUNTER — HOSPITAL ENCOUNTER (OUTPATIENT)
Dept: INFUSION THERAPY | Age: 61
Discharge: HOME | End: 2021-08-26
Payer: COMMERCIAL

## 2021-08-26 DIAGNOSIS — C18.9 COLON CANCER METASTASIZED TO INTRA-ABDOMINAL LYMPH NODE (HCC): Primary | ICD-10-CM

## 2021-08-26 DIAGNOSIS — C77.2 COLON CANCER METASTASIZED TO INTRA-ABDOMINAL LYMPH NODE (HCC): Primary | ICD-10-CM

## 2021-08-26 PROCEDURE — 6360000002 HC RX W HCPCS: Performed by: INTERNAL MEDICINE

## 2021-08-26 PROCEDURE — 96523 IRRIG DRUG DELIVERY DEVICE: CPT

## 2021-08-26 PROCEDURE — 2580000003 HC RX 258: Performed by: INTERNAL MEDICINE

## 2021-08-26 RX ORDER — HEPARIN SODIUM (PORCINE) LOCK FLUSH IV SOLN 100 UNIT/ML 100 UNIT/ML
500 SOLUTION INTRAVENOUS PRN
Status: DISCONTINUED | OUTPATIENT
Start: 2021-08-26 | End: 2021-08-27 | Stop reason: HOSPADM

## 2021-08-26 RX ORDER — SODIUM CHLORIDE 0.9 % (FLUSH) 0.9 %
10 SYRINGE (ML) INJECTION PRN
Status: DISCONTINUED | OUTPATIENT
Start: 2021-08-26 | End: 2021-08-27 | Stop reason: HOSPADM

## 2021-08-26 RX ORDER — HEPARIN SODIUM (PORCINE) LOCK FLUSH IV SOLN 100 UNIT/ML 100 UNIT/ML
500 SOLUTION INTRAVENOUS PRN
Status: CANCELLED | OUTPATIENT
Start: 2021-08-26

## 2021-08-26 RX ORDER — SODIUM CHLORIDE 0.9 % (FLUSH) 0.9 %
10 SYRINGE (ML) INJECTION PRN
Status: CANCELLED | OUTPATIENT
Start: 2021-08-26

## 2021-08-26 RX ADMIN — Medication 500 UNITS: at 11:47

## 2021-08-26 RX ADMIN — SODIUM CHLORIDE, PRESERVATIVE FREE 10 ML: 5 INJECTION INTRAVENOUS at 11:47

## 2021-08-26 NOTE — PROGRESS NOTES
Patient presents to clinic for [facility] today. Left chest  SQ port accessed per policy using 12Z .81SE  Wagner needle for good blood return. Site flushed easily with 10 mL NSS followed by 5 mL Heparin solution 100 units/ml rinse prior to de-access. Dry sterile dressing to area. Tolerated procedure well. Encouraged to schedule port flush every 4 weeks.

## 2021-10-05 ENCOUNTER — OFFICE VISIT (OUTPATIENT)
Dept: FAMILY MEDICINE CLINIC | Age: 61
End: 2021-10-05
Payer: COMMERCIAL

## 2021-10-05 VITALS
BODY MASS INDEX: 20.27 KG/M2 | DIASTOLIC BLOOD PRESSURE: 69 MMHG | HEIGHT: 71 IN | HEART RATE: 90 BPM | TEMPERATURE: 98 F | OXYGEN SATURATION: 99 % | WEIGHT: 144.8 LBS | SYSTOLIC BLOOD PRESSURE: 129 MMHG | RESPIRATION RATE: 12 BRPM

## 2021-10-05 DIAGNOSIS — R73.9 HYPERGLYCEMIA: ICD-10-CM

## 2021-10-05 DIAGNOSIS — T45.1X5A NEUROPATHY DUE TO CHEMOTHERAPEUTIC DRUG (HCC): ICD-10-CM

## 2021-10-05 DIAGNOSIS — C77.2 COLON CANCER METASTASIZED TO INTRA-ABDOMINAL LYMPH NODE (HCC): ICD-10-CM

## 2021-10-05 DIAGNOSIS — G62.0 NEUROPATHY DUE TO CHEMOTHERAPEUTIC DRUG (HCC): ICD-10-CM

## 2021-10-05 DIAGNOSIS — I10 ESSENTIAL HYPERTENSION: Primary | ICD-10-CM

## 2021-10-05 DIAGNOSIS — C18.9 COLON CANCER METASTASIZED TO INTRA-ABDOMINAL LYMPH NODE (HCC): ICD-10-CM

## 2021-10-05 PROCEDURE — G8420 CALC BMI NORM PARAMETERS: HCPCS | Performed by: INTERNAL MEDICINE

## 2021-10-05 PROCEDURE — G8427 DOCREV CUR MEDS BY ELIG CLIN: HCPCS | Performed by: INTERNAL MEDICINE

## 2021-10-05 PROCEDURE — 3017F COLORECTAL CA SCREEN DOC REV: CPT | Performed by: INTERNAL MEDICINE

## 2021-10-05 PROCEDURE — 4004F PT TOBACCO SCREEN RCVD TLK: CPT | Performed by: INTERNAL MEDICINE

## 2021-10-05 PROCEDURE — 99214 OFFICE O/P EST MOD 30 MIN: CPT | Performed by: INTERNAL MEDICINE

## 2021-10-05 PROCEDURE — G8484 FLU IMMUNIZE NO ADMIN: HCPCS | Performed by: INTERNAL MEDICINE

## 2021-10-06 NOTE — PROGRESS NOTES
10/5/2021  Visit type: Established patient    Reason for Visit: Hypertension (f/u) and Flu Vaccine (pt declined)      ASSESSMENT AND PLAN   1. Essential hypertension  Assessment & Plan:  Stable continue present management with amlodipine. 2. Colon cancer metastasized to intra-abdominal lymph node [facility])  Assessment & Plan:  Stable under care of oncologist.   3. Neuropathy due to chemotherapeutic drug [facility])  Assessment & Plan:  Stable continue present management with gabapentin  4. Hyperglycemia  -     Hemoglobin A1C; Future          I recommend vaccines for:  Flu  Pneumonia  Shingles   COVID booster      He is to consult his oncologist before proceeding. Return in about 4 months (around 2/5/2022).     ----------------------------------------------------------------------    SUBJECTIVE    Chief Complaint   Patient presents with    Hypertension     f/u    Flu Vaccine     pt declined     HPI   Here for follow up on hypertension and feeling well. Review of Systems     Denies fever, chills, chest pain, and shortness of breath     OBJECTIVE    /69   Pulse 90   Temp 98 °F (36.7 °C) (Temporal)   Resp 12   Ht 5' 11\" (1.803 m)   Wt 144 lb 12.8 oz (65.7 kg)   SpO2 99%   BMI 20.20 kg/m²   Physical Exam  Constitutional:       General: He is not in acute distress. HENT:      Mouth/Throat:      Pharynx: Oropharynx is clear. Cardiovascular:      Rate and Rhythm: Normal rate and regular rhythm. Heart sounds: Normal heart sounds. No murmur heard. No friction rub. No gallop. Pulmonary:      Breath sounds: Normal breath sounds. No stridor. No wheezing, rhonchi or rales. Musculoskeletal:      Right lower leg: No edema. Left lower leg: No edema. Neurological:      General: No focal deficit present.       Mental Status: He is alert.         ---------------------------------------------------------------------------    Data reviewed and summarized-    Reviewed   Labs CMP CEA CBC  Imaging CT Notes oncology x 2        Anastasia Shoemaker MD

## 2021-10-08 DIAGNOSIS — C77.2 COLON CANCER METASTASIZED TO INTRA-ABDOMINAL LYMPH NODE (HCC): Primary | ICD-10-CM

## 2021-10-08 DIAGNOSIS — C18.9 COLON CANCER METASTASIZED TO INTRA-ABDOMINAL LYMPH NODE (HCC): Primary | ICD-10-CM

## 2021-10-11 ENCOUNTER — HOSPITAL ENCOUNTER (OUTPATIENT)
Dept: INFUSION THERAPY | Age: 61
Discharge: HOME | End: 2021-10-11
Payer: COMMERCIAL

## 2021-10-11 DIAGNOSIS — C77.2 COLON CANCER METASTASIZED TO INTRA-ABDOMINAL LYMPH NODE (HCC): Primary | ICD-10-CM

## 2021-10-11 DIAGNOSIS — C18.9 COLON CANCER METASTASIZED TO INTRA-ABDOMINAL LYMPH NODE (HCC): Primary | ICD-10-CM

## 2021-10-11 LAB
ALBUMIN SERPL-MCNC: 5.1 G/DL (ref 3.5–5.2)
ALP BLD-CCNC: 92 U/L (ref 40–129)
ALT SERPL-CCNC: 24 U/L (ref 0–40)
ANION GAP SERPL CALCULATED.3IONS-SCNC: 11 MMOL/L (ref 7–16)
AST SERPL-CCNC: 59 U/L (ref 0–39)
BASOPHILS ABSOLUTE: 0.07 E9/L (ref 0–0.2)
BASOPHILS RELATIVE PERCENT: 1 % (ref 0–2)
BILIRUB SERPL-MCNC: 0.6 MG/DL (ref 0–1.2)
BUN BLDV-MCNC: 8 MG/DL (ref 6–23)
CALCIUM SERPL-MCNC: 10.2 MG/DL (ref 8.6–10.2)
CEA: 6.6 NG/ML (ref 0–5.2)
CHLORIDE BLD-SCNC: 93 MMOL/L (ref 98–107)
CO2: 26 MMOL/L (ref 22–29)
CREAT SERPL-MCNC: 0.9 MG/DL (ref 0.7–1.2)
EOSINOPHILS ABSOLUTE: 0.06 E9/L (ref 0.05–0.5)
EOSINOPHILS RELATIVE PERCENT: 0.8 % (ref 0–6)
GFR AFRICAN AMERICAN: >60
GFR NON-AFRICAN AMERICAN: >60 ML/MIN/1.73
GLUCOSE BLD-MCNC: 86 MG/DL (ref 74–99)
HCT VFR BLD CALC: 39.9 % (ref 37–54)
HEMOGLOBIN: 13.9 G/DL (ref 12.5–16.5)
IMMATURE GRANULOCYTES #: 0.02 E9/L
IMMATURE GRANULOCYTES %: 0.3 % (ref 0–5)
LYMPHOCYTES ABSOLUTE: 0.84 E9/L (ref 1.5–4)
LYMPHOCYTES RELATIVE PERCENT: 11.6 % (ref 20–42)
MCH RBC QN AUTO: 33.3 PG (ref 26–35)
MCHC RBC AUTO-ENTMCNC: 34.8 % (ref 32–34.5)
MCV RBC AUTO: 95.5 FL (ref 80–99.9)
MONOCYTES ABSOLUTE: 0.69 E9/L (ref 0.1–0.95)
MONOCYTES RELATIVE PERCENT: 9.5 % (ref 2–12)
NEUTROPHILS ABSOLUTE: 5.57 E9/L (ref 1.8–7.3)
NEUTROPHILS RELATIVE PERCENT: 76.8 % (ref 43–80)
PDW BLD-RTO: 13.2 FL (ref 11.5–15)
PLATELET # BLD: 246 E9/L (ref 130–450)
PMV BLD AUTO: 8.9 FL (ref 7–12)
POTASSIUM SERPL-SCNC: 4.5 MMOL/L (ref 3.5–5)
RBC # BLD: 4.18 E12/L (ref 3.8–5.8)
SODIUM BLD-SCNC: 130 MMOL/L (ref 132–146)
TOTAL PROTEIN: 8.2 G/DL (ref 6.4–8.3)
WBC # BLD: 7.3 E9/L (ref 4.5–11.5)

## 2021-10-11 PROCEDURE — 85025 COMPLETE CBC W/AUTO DIFF WBC: CPT

## 2021-10-11 PROCEDURE — 2580000003 HC RX 258: Performed by: INTERNAL MEDICINE

## 2021-10-11 PROCEDURE — 80053 COMPREHEN METABOLIC PANEL: CPT

## 2021-10-11 PROCEDURE — 36591 DRAW BLOOD OFF VENOUS DEVICE: CPT

## 2021-10-11 PROCEDURE — 6360000002 HC RX W HCPCS: Performed by: INTERNAL MEDICINE

## 2021-10-11 PROCEDURE — 82378 CARCINOEMBRYONIC ANTIGEN: CPT

## 2021-10-11 RX ORDER — SODIUM CHLORIDE 0.9 % (FLUSH) 0.9 %
10 SYRINGE (ML) INJECTION PRN
Status: DISCONTINUED | OUTPATIENT
Start: 2021-10-11 | End: 2021-10-12 | Stop reason: HOSPADM

## 2021-10-11 RX ORDER — HEPARIN SODIUM (PORCINE) LOCK FLUSH IV SOLN 100 UNIT/ML 100 UNIT/ML
500 SOLUTION INTRAVENOUS PRN
Status: DISCONTINUED | OUTPATIENT
Start: 2021-10-11 | End: 2021-10-12 | Stop reason: HOSPADM

## 2021-10-11 RX ORDER — SODIUM CHLORIDE 0.9 % (FLUSH) 0.9 %
10 SYRINGE (ML) INJECTION PRN
Status: CANCELLED | OUTPATIENT
Start: 2021-10-11

## 2021-10-11 RX ORDER — HEPARIN SODIUM (PORCINE) LOCK FLUSH IV SOLN 100 UNIT/ML 100 UNIT/ML
500 SOLUTION INTRAVENOUS PRN
Status: CANCELLED | OUTPATIENT
Start: 2021-10-11

## 2021-10-11 RX ADMIN — SODIUM CHLORIDE, PRESERVATIVE FREE 10 ML: 5 INJECTION INTRAVENOUS at 10:44

## 2021-10-11 RX ADMIN — Medication 500 UNITS: at 10:44

## 2021-10-11 NOTE — PROGRESS NOTES
Patient presents to clinic for labs today. Left chest SQ port accessed per policy using 20 G 8.67 inch Wagner needle for good blood return. Aspirate for waste and specimen sent to lab. Site flushed easily with 10 mL NSS followed by 5 mL Heparin solution 100 units/ml rinse prior to de-access. Dry sterile dressing to area. Tolerated procedure well. Encouraged to schedule port flush every 4 weeks.

## 2021-10-12 ENCOUNTER — OFFICE VISIT (OUTPATIENT)
Dept: ONCOLOGY | Age: 61
End: 2021-10-12
Payer: COMMERCIAL

## 2021-10-12 VITALS
TEMPERATURE: 97.8 F | HEIGHT: 71 IN | BODY MASS INDEX: 20.29 KG/M2 | SYSTOLIC BLOOD PRESSURE: 147 MMHG | WEIGHT: 144.9 LBS | OXYGEN SATURATION: 98 % | DIASTOLIC BLOOD PRESSURE: 81 MMHG | HEART RATE: 98 BPM

## 2021-10-12 DIAGNOSIS — C18.9 COLON CANCER METASTASIZED TO INTRA-ABDOMINAL LYMPH NODE (HCC): Primary | ICD-10-CM

## 2021-10-12 DIAGNOSIS — C77.2 COLON CANCER METASTASIZED TO INTRA-ABDOMINAL LYMPH NODE (HCC): Primary | ICD-10-CM

## 2021-10-12 PROCEDURE — 99213 OFFICE O/P EST LOW 20 MIN: CPT

## 2021-10-12 NOTE — PROGRESS NOTES
[address]  [facility]   Hematology/Oncology  Consult      Patient Name: Serene Huff  YOB: 1960  PCP: Jose No MD   Referring Provider:      Reason for Consultation:   No chief complaint on file. Subjective:  Feels well today, no issues in interim. BMs unchanged, no bleeding, no weight loss. Neuropathy stable. Still smoking, but down to a pack every 2 days, discussed cessation    History of Present Illness: This pt is a pleasant 64 yo male who was diagnosed with colon cancer in 11/19 (reportedly stage IIIB), s/p resection and 2/31 LNs positive, and reportedly received adj chemotherapy and has chronic neuropathy from this. No records are available for review today, however he states he received 8 cycles of full dose FOLFOX and 4 cycles of DR FOLFOX. He still has his PORT. He has no acute complaints today, including abdominal pain, change in bowel habits, pain with defecation, tenesmus, hematochezia or melena. He has maintained a stable weight.  He overall feels well    Diagnostic Data:     Past Medical History:   Diagnosis Date    Cataract     bilateral    Colon cancer ([address]     Hypertension     Neuropathy     finger/feet from chemotherapy       Patient Active Problem List    Diagnosis Date Noted    Abnormal LFTs 03/25/2021    Alcohol use 03/25/2021    Colon cancer metastasized to intra-abdominal lymph node ([address] 12/17/2020    Neuropathy due to chemotherapeutic drug ([address] 12/17/2020    Essential hypertension 12/17/2020    Smoker 12/17/2020        Past Surgical History:   Procedure Laterality Date    COLON SURGERY  2019    EYE SURGERY Bilateral     SUBTOTAL COLECTOMY  11/21/2019       Family History  Family History   Problem Relation Age of Onset    Stroke Mother     High Blood Pressure Mother     Heart Disease Father     Heart Attack Father     Kidney Disease Sister     Cancer Brother         Prostate    No Known Problems Maternal Aunt     No Known Problems Maternal Uncle     No Known Problems Paternal Aunt     No Known Problems Paternal Uncle     No Known Problems Maternal Grandmother     No Known Problems Maternal Grandfather     No Known Problems Paternal Grandmother     No Known Problems Paternal Grandfather     No Known Problems Maternal Cousin     No Known Problems Paternal Cousin     No Known Problems Brother     Anxiety Disorder Daughter     No Known Problems Grandchild        Social History    TOBACCO:   reports that he has been smoking cigarettes. He started smoking about 43 years ago. He has a 40.00 pack-year smoking history. He has never used smokeless tobacco.  ETOH:   reports current alcohol use of about 70.0 standard drinks of alcohol per week. Home Medications  Prior to Admission medications    Medication Sig Start Date End Date Taking? Authorizing Provider   gabapentin (NEURONTIN) 100 MG capsule Take 1 capsule by mouth 3 times daily for 360 days. 6/29/21 6/24/22  Jazmín Ponce MD   amLODIPine (NORVASC) 10 MG tablet Take 1 tablet by mouth daily 6/29/21   Jazmín Ponce MD   Multiple Vitamins-Minerals (MULTIVITAMIN GUMMIES MENS) CHEW Take 1 tablet by mouth daily as needed    Historical Provider, MD       Allergies  No Known Allergies    Review of Systems:    + for neuropathy of BL U/LE      Objective  There were no vitals taken for this visit. Physical Performance Status    Physical Exam:  General: AAO to person, place, time, and purpose, appears stated age, cooperative, no acute distress, pleasant   Head and neck : PERRLA, EOMI . Sclera non icteric. Oropharynx : Clear  Neck: no JVD,  no adenopathy  LYMPHATICS : No LAD  Lungs: Clear to auscultation   Heart: Regular rate and regular rhythm, no murmur, normal S1, S2  Abdomen: Soft, non-tender;no masses, no organomegaly  Extremities: No edema,no cyanosis, no clubbing. Skin:  No rash. Neurologic:Cranial nerves grossly intact. No focal motor or sensory deficits . Recent Laboratory Data-   Lab Results   Component Value Date    WBC 7.3 10/11/2021    HGB 13.9 10/11/2021    HCT 39.9 10/11/2021    MCV 95.5 10/11/2021     10/11/2021    LYMPHOPCT 11.6 (L) 10/11/2021    RBC 4.18 10/11/2021    MCH 33.3 10/11/2021    MCHC 34.8 (H) 10/11/2021    RDW 13.2 10/11/2021    NEUTOPHILPCT 76.8 10/11/2021    MONOPCT 9.5 10/11/2021    BASOPCT 1.0 10/11/2021    NEUTROABS 5.57 10/11/2021    LYMPHSABS 0.84 (L) 10/11/2021    MONOSABS 0.69 10/11/2021    EOSABS 0.06 10/11/2021    BASOSABS 0.07 10/11/2021       Lab Results   Component Value Date     (L) 10/11/2021    K 4.5 10/11/2021    CL 93 (L) 10/11/2021    CO2 26 10/11/2021    BUN 8 10/11/2021    CREATININE 0.9 10/11/2021    GLUCOSE 86 10/11/2021    CALCIUM 10.2 10/11/2021    PROT 8.2 10/11/2021    LABALBU 5.1 10/11/2021    BILITOT 0.6 10/11/2021    ALKPHOS 92 10/11/2021    AST 59 (H) 10/11/2021    ALT 24 10/11/2021    LABGLOM >60 10/11/2021    GFRAA >60 10/11/2021       Lab Results   Component Value Date    IRON 140 01/26/2021    TIBC 355 01/26/2021    FERRITIN 214 01/26/2021           Radiology-    No results found. ASSESSMENT/PLAN :    Diagnoses and all orders for this visit:    Colon cancer metastasized to intra-abdominal lymph node ([facility]       62 yo male  Reportedly history of stage IIIB colon cancer  S/p resection and adj FOLFOX x 12 with some DR at the end  Iatrogenic neuropathy    - Will request records from Dr. Himanshu Vasquez including pathology (reported 2/31 LNs+)  - He is due for CT A/P and this is ordered today  - Will need referral at next visit for colonoscopy  - CBC/CMP/CEA today  - RTC in 1 months to review results, then q 3 months with port flush q ~6 weeks    1/19/21  - Stage III B colon cancer s/p resection and 12 cycles adj FOLFOX  - CT A/P with no evidence of recurrence of metastatic disease.  Diffuse hepatic steatosis was noted  - CBC and CMP was WNL with the exception of elevated LFTs, likely due to the steatosis  - CEA 7.4, will trend  - Refer to Dr. Daya Jean for colonoscopy surveillance  - PORT flush q 6 weeks  - RTC in 3 months with repeat labs    4/20/21  - Stage III B colon cancer s/p resection and 12 cycles adj FOLFOX  - CT A/P as above  - CBC and CMP was WNL with the exception of elevated LFTs (though improved from prior), likely due to the steatosis  - CEA up from 7.4 to 8.2. Still smoking 1 ppd, will work on quitting   - Referred to Dr. Daya Jean for colonoscopy surveillance  - PORT flush q 6 weeks  - RTC in 3 months with repeat labs at CT A/P    7/13/21  - Stage III B colon cancer s/p resection and 12 cycles adj FOLFOX (6/20)  - CT A/P negative for recurrence, hepatic steatosis noted  - CBC and CMP was WNL. Elevated LFTs further improved, likely due to the steatosis  - CEA down to 7.0. Still smoking 1 ppd, discussed cessation again  - Dr. Daya Jean for colonoscopy surveillance  - PORT flush q 6 weeks  - RTC in 3 months    10/12/21  - Stage III B colon cancer s/p resection and 12 cycles adj FOLFOX (6/20)  - CT A/P due prior to follow up, ordered  - CBC and CMP was WNL. Elevated LFTs improved further  - CEA down to 6.6. Still smoking, but down to a pack q 2 days, cessation counseling provided  - Dr. Daya Jean for colonoscopy surveillance  - PORT flush q 6 weeks.  Can have out at 2 years  - RTC in 3 months    Eliza Lee MD   Electronically signed 10/12/2021 at 11:05 AM

## 2021-10-21 ENCOUNTER — HOSPITAL ENCOUNTER (OUTPATIENT)
Dept: CT IMAGING | Age: 61
Discharge: HOME | End: 2021-10-21
Payer: COMMERCIAL

## 2021-10-21 DIAGNOSIS — C77.2 COLON CANCER METASTASIZED TO INTRA-ABDOMINAL LYMPH NODE (HCC): ICD-10-CM

## 2021-10-21 DIAGNOSIS — C18.9 COLON CANCER METASTASIZED TO INTRA-ABDOMINAL LYMPH NODE (HCC): ICD-10-CM

## 2021-10-21 PROCEDURE — 6360000004 HC RX CONTRAST MEDICATION: Performed by: RADIOLOGY

## 2021-10-21 PROCEDURE — 74177 CT ABD & PELVIS W/CONTRAST: CPT

## 2021-10-21 RX ADMIN — IOPAMIDOL 80 ML: 755 INJECTION, SOLUTION INTRAVENOUS at 08:00

## 2021-10-21 RX ADMIN — IOHEXOL 50 ML: 240 INJECTION, SOLUTION INTRATHECAL; INTRAVASCULAR; INTRAVENOUS; ORAL at 07:58

## 2021-12-07 ENCOUNTER — HOSPITAL ENCOUNTER (OUTPATIENT)
Dept: INFUSION THERAPY | Age: 61
Discharge: HOME | End: 2021-12-07
Payer: COMMERCIAL

## 2021-12-07 DIAGNOSIS — C18.9 COLON CANCER METASTASIZED TO INTRA-ABDOMINAL LYMPH NODE (HCC): Primary | ICD-10-CM

## 2021-12-07 DIAGNOSIS — C77.2 COLON CANCER METASTASIZED TO INTRA-ABDOMINAL LYMPH NODE (HCC): Primary | ICD-10-CM

## 2021-12-07 PROCEDURE — 2580000003 HC RX 258: Performed by: INTERNAL MEDICINE

## 2021-12-07 PROCEDURE — 96523 IRRIG DRUG DELIVERY DEVICE: CPT

## 2021-12-07 PROCEDURE — 6360000002 HC RX W HCPCS: Performed by: INTERNAL MEDICINE

## 2021-12-07 RX ORDER — SODIUM CHLORIDE 0.9 % (FLUSH) 0.9 %
10 SYRINGE (ML) INJECTION PRN
Status: CANCELLED | OUTPATIENT
Start: 2021-12-07

## 2021-12-07 RX ORDER — HEPARIN SODIUM (PORCINE) LOCK FLUSH IV SOLN 100 UNIT/ML 100 UNIT/ML
500 SOLUTION INTRAVENOUS PRN
Status: CANCELLED | OUTPATIENT
Start: 2021-12-07

## 2021-12-07 RX ORDER — HEPARIN SODIUM (PORCINE) LOCK FLUSH IV SOLN 100 UNIT/ML 100 UNIT/ML
500 SOLUTION INTRAVENOUS PRN
Status: DISCONTINUED | OUTPATIENT
Start: 2021-12-07 | End: 2021-12-08 | Stop reason: HOSPADM

## 2021-12-07 RX ORDER — SODIUM CHLORIDE 0.9 % (FLUSH) 0.9 %
10 SYRINGE (ML) INJECTION PRN
Status: DISCONTINUED | OUTPATIENT
Start: 2021-12-07 | End: 2021-12-08 | Stop reason: HOSPADM

## 2021-12-07 RX ADMIN — SODIUM CHLORIDE, PRESERVATIVE FREE 500 UNITS: 5 INJECTION INTRAVENOUS at 09:13

## 2021-12-07 RX ADMIN — Medication 30 ML: at 09:12

## 2021-12-07 NOTE — PROGRESS NOTES
PORT FLUSH    Patient presents to clinic for [facility] today. left  SQ port accessed per policy using Wagner needle for good blood return. Aspirate for waste and specimen sent to lab. Site flushed easily with 10 mL NSS followed by 5 mL Heparin solution 100 units/ml rinse prior to de-access. Dry sterile dressing to area. Tolerated procedure well. Encouraged to schedule port flush every 4 weeks.

## 2022-01-10 ENCOUNTER — HOSPITAL ENCOUNTER (OUTPATIENT)
Dept: INFUSION THERAPY | Age: 62
Discharge: HOME | End: 2022-01-10
Payer: COMMERCIAL

## 2022-01-10 DIAGNOSIS — C18.9 COLON CANCER METASTASIZED TO INTRA-ABDOMINAL LYMPH NODE (HCC): Primary | ICD-10-CM

## 2022-01-10 DIAGNOSIS — C77.2 COLON CANCER METASTASIZED TO INTRA-ABDOMINAL LYMPH NODE (HCC): Primary | ICD-10-CM

## 2022-01-10 LAB
ALBUMIN SERPL-MCNC: 5.2 G/DL (ref 3.5–5.2)
ALP BLD-CCNC: 90 U/L (ref 40–129)
ALT SERPL-CCNC: 35 U/L (ref 0–40)
ANION GAP SERPL CALCULATED.3IONS-SCNC: 12 MMOL/L (ref 7–16)
AST SERPL-CCNC: 89 U/L (ref 0–39)
BASOPHILS ABSOLUTE: 0.05 E9/L (ref 0–0.2)
BASOPHILS RELATIVE PERCENT: 0.8 % (ref 0–2)
BILIRUB SERPL-MCNC: 0.7 MG/DL (ref 0–1.2)
BUN BLDV-MCNC: 7 MG/DL (ref 6–23)
CALCIUM SERPL-MCNC: 9.9 MG/DL (ref 8.6–10.2)
CEA: 6.6 NG/ML (ref 0–5.2)
CHLORIDE BLD-SCNC: 94 MMOL/L (ref 98–107)
CO2: 25 MMOL/L (ref 22–29)
CREAT SERPL-MCNC: 0.9 MG/DL (ref 0.7–1.2)
EOSINOPHILS ABSOLUTE: 0.08 E9/L (ref 0.05–0.5)
EOSINOPHILS RELATIVE PERCENT: 1.3 % (ref 0–6)
GFR AFRICAN AMERICAN: >60
GFR NON-AFRICAN AMERICAN: >60 ML/MIN/1.73
GLUCOSE BLD-MCNC: 117 MG/DL (ref 74–99)
HCT VFR BLD CALC: 40.6 % (ref 37–54)
HEMOGLOBIN: 13.9 G/DL (ref 12.5–16.5)
IMMATURE GRANULOCYTES #: 0.04 E9/L
IMMATURE GRANULOCYTES %: 0.7 % (ref 0–5)
LYMPHOCYTES ABSOLUTE: 1.09 E9/L (ref 1.5–4)
LYMPHOCYTES RELATIVE PERCENT: 18 % (ref 20–42)
MCH RBC QN AUTO: 32.9 PG (ref 26–35)
MCHC RBC AUTO-ENTMCNC: 34.2 % (ref 32–34.5)
MCV RBC AUTO: 96.2 FL (ref 80–99.9)
MONOCYTES ABSOLUTE: 0.66 E9/L (ref 0.1–0.95)
MONOCYTES RELATIVE PERCENT: 10.9 % (ref 2–12)
NEUTROPHILS ABSOLUTE: 4.14 E9/L (ref 1.8–7.3)
NEUTROPHILS RELATIVE PERCENT: 68.3 % (ref 43–80)
PDW BLD-RTO: 13.2 FL (ref 11.5–15)
PLATELET # BLD: 221 E9/L (ref 130–450)
PMV BLD AUTO: 8.8 FL (ref 7–12)
POTASSIUM SERPL-SCNC: 4.4 MMOL/L (ref 3.5–5)
RBC # BLD: 4.22 E12/L (ref 3.8–5.8)
SODIUM BLD-SCNC: 131 MMOL/L (ref 132–146)
TOTAL PROTEIN: 8.6 G/DL (ref 6.4–8.3)
WBC # BLD: 6.1 E9/L (ref 4.5–11.5)

## 2022-01-10 PROCEDURE — 82378 CARCINOEMBRYONIC ANTIGEN: CPT

## 2022-01-10 PROCEDURE — 6360000002 HC RX W HCPCS: Performed by: INTERNAL MEDICINE

## 2022-01-10 PROCEDURE — 36591 DRAW BLOOD OFF VENOUS DEVICE: CPT

## 2022-01-10 PROCEDURE — 2580000003 HC RX 258: Performed by: INTERNAL MEDICINE

## 2022-01-10 PROCEDURE — 80053 COMPREHEN METABOLIC PANEL: CPT

## 2022-01-10 PROCEDURE — 85025 COMPLETE CBC W/AUTO DIFF WBC: CPT

## 2022-01-10 RX ORDER — HEPARIN SODIUM (PORCINE) LOCK FLUSH IV SOLN 100 UNIT/ML 100 UNIT/ML
500 SOLUTION INTRAVENOUS PRN
Status: DISCONTINUED | OUTPATIENT
Start: 2022-01-10 | End: 2022-01-11 | Stop reason: HOSPADM

## 2022-01-10 RX ORDER — SODIUM CHLORIDE 0.9 % (FLUSH) 0.9 %
5-40 SYRINGE (ML) INJECTION PRN
Status: DISCONTINUED | OUTPATIENT
Start: 2022-01-10 | End: 2022-01-11 | Stop reason: HOSPADM

## 2022-01-10 RX ORDER — SODIUM CHLORIDE 9 MG/ML
25 INJECTION, SOLUTION INTRAVENOUS PRN
Status: CANCELLED | OUTPATIENT
Start: 2022-01-10

## 2022-01-10 RX ORDER — SODIUM CHLORIDE 0.9 % (FLUSH) 0.9 %
5-40 SYRINGE (ML) INJECTION PRN
Status: CANCELLED | OUTPATIENT
Start: 2022-01-10

## 2022-01-10 RX ORDER — HEPARIN SODIUM (PORCINE) LOCK FLUSH IV SOLN 100 UNIT/ML 100 UNIT/ML
500 SOLUTION INTRAVENOUS PRN
Status: CANCELLED | OUTPATIENT
Start: 2022-01-10

## 2022-01-10 RX ADMIN — SODIUM CHLORIDE, PRESERVATIVE FREE 10 ML: 5 INJECTION INTRAVENOUS at 11:06

## 2022-01-10 RX ADMIN — Medication 500 UNITS: at 11:06

## 2022-01-10 NOTE — PROGRESS NOTES
Patient presents to clinic for labs today. [address]  SQ port accessed per policy using 20 G, 0.44 inch Wagner needle for good blood return. Aspirate for waste and specimen sent to lab. Site flushed easily with 10 mL NSS followed by 5 mL Heparin solution 100 units/ml rinse prior to de-access. Dry sterile dressing to area. Tolerated procedure well. Encouraged to schedule port flush every 4 weeks.

## 2022-01-11 ENCOUNTER — OFFICE VISIT (OUTPATIENT)
Dept: ONCOLOGY | Age: 62
End: 2022-01-11
Payer: COMMERCIAL

## 2022-01-11 VITALS
WEIGHT: 149.8 LBS | HEART RATE: 95 BPM | SYSTOLIC BLOOD PRESSURE: 131 MMHG | BODY MASS INDEX: 20.97 KG/M2 | HEIGHT: 71 IN | OXYGEN SATURATION: 98 % | DIASTOLIC BLOOD PRESSURE: 85 MMHG | TEMPERATURE: 97.8 F

## 2022-01-11 DIAGNOSIS — C18.9 COLON CANCER METASTASIZED TO INTRA-ABDOMINAL LYMPH NODE (HCC): Primary | ICD-10-CM

## 2022-01-11 DIAGNOSIS — C77.2 COLON CANCER METASTASIZED TO INTRA-ABDOMINAL LYMPH NODE (HCC): Primary | ICD-10-CM

## 2022-01-11 PROCEDURE — 99212 OFFICE O/P EST SF 10 MIN: CPT

## 2022-01-11 NOTE — PROGRESS NOTES
[address]  [address] [place] [address]   Hematology/Oncology  Consult      Patient Name: Santhosh Rosales  YOB: 1960  PCP: Patricia Francis MD   Referring Provider:      Reason for Consultation:   Chief Complaint   Patient presents with    Follow-up     colon cancer metastasized to ontra-abdominal lymph node [facility])    Cancer        Subjective:  Feels well today, no issues in interim. BMs unchanged, no bleeding, no weight loss. Neuropathy stable. Still smoking around 10 cigs/day. Discussed cessation again    History of Present Illness: This pt is a pleasant 64 yo male who was diagnosed with colon cancer in 11/19 (reportedly stage IIIB), s/p resection and 2/31 LNs positive, and reportedly received adj chemotherapy and has chronic neuropathy from this. No records are available for review today, however he states he received 8 cycles of full dose FOLFOX and 4 cycles of DR FOLFOX. He still has his PORT. He has no acute complaints today, including abdominal pain, change in bowel habits, pain with defecation, tenesmus, hematochezia or melena. He has maintained a stable weight.  He overall feels well    Diagnostic Data:     Past Medical History:   Diagnosis Date    Cataract     bilateral    Colon cancer ([address]     Hypertension     Neuropathy     finger/feet from chemotherapy       Patient Active Problem List    Diagnosis Date Noted    Abnormal LFTs 03/25/2021    Alcohol use 03/25/2021    Colon cancer metastasized to intra-abdominal lymph node ([address] 12/17/2020    Neuropathy due to chemotherapeutic drug ([address] 12/17/2020    Essential hypertension 12/17/2020    Smoker 12/17/2020        Past Surgical History:   Procedure Laterality Date    COLON SURGERY  2019    EYE SURGERY Bilateral     SUBTOTAL COLECTOMY  11/21/2019       Family History  Family History   Problem Relation Age of Onset    Stroke Mother     High Blood Pressure Mother     Heart Disease Father    [facility] Heart Attack Father     Kidney Disease Sister     Cancer Brother         Prostate    No Known Problems Maternal Aunt     No Known Problems Maternal Uncle     No Known Problems Paternal Aunt     No Known Problems Paternal Uncle     No Known Problems Maternal Grandmother     No Known Problems Maternal Grandfather     No Known Problems Paternal Grandmother     No Known Problems Paternal Grandfather     No Known Problems Maternal Cousin     No Known Problems Paternal Cousin     No Known Problems Brother     Anxiety Disorder Daughter     No Known Problems Grandchild        Social History    TOBACCO:   reports that he has been smoking cigarettes. He started smoking about 44 years ago. He has a 40.00 pack-year smoking history. He has never used smokeless tobacco.  ETOH:   reports current alcohol use of about 70.0 standard drinks of alcohol per week. Home Medications  Prior to Admission medications    Medication Sig Start Date End Date Taking? Authorizing Provider   gabapentin (NEURONTIN) 100 MG capsule Take 1 capsule by mouth 3 times daily for 360 days. 6/29/21 6/24/22 Yes Ignacio Templeton MD   amLODIPine (NORVASC) 10 MG tablet Take 1 tablet by mouth daily 6/29/21  Yes Ignacio Templeton MD   Multiple Vitamins-Minerals (MULTIVITAMIN GUMMIES MENS) CHEW Take 1 tablet by mouth daily as needed   Yes Historical Provider, MD       Allergies  No Known Allergies    Review of Systems:    + for neuropathy of BL U/LE      Objective  /85   Pulse 95   Temp 97.8 °F (36.6 °C)   Ht 5' 11\" (1.803 m)   Wt 149 lb 12.8 oz (67.9 kg)   SpO2 98%   BMI 20.89 kg/m²     Physical Performance Status    Physical Exam:  General: AAO to person, place, time, and purpose, appears stated age, cooperative, no acute distress, pleasant   Head and neck : PERRLA, EOMI . Sclera non icteric.   Oropharynx : Clear  Neck: no JVD,  no adenopathy  LYMPHATICS : No LAD  Lungs: Clear to auscultation   Heart: Regular rate and regular rhythm, no murmur, normal S1, S2  Abdomen: Soft, non-tender;no masses, no organomegaly  Extremities: No edema,no cyanosis, no clubbing. Skin:  No rash. Neurologic:Cranial nerves grossly intact. No focal motor or sensory deficits . Recent Laboratory Data-   Lab Results   Component Value Date    WBC 6.1 01/10/2022    HGB 13.9 01/10/2022    HCT 40.6 01/10/2022    MCV 96.2 01/10/2022     01/10/2022    LYMPHOPCT 18.0 (L) 01/10/2022    RBC 4.22 01/10/2022    MCH 32.9 01/10/2022    MCHC 34.2 01/10/2022    RDW 13.2 01/10/2022    NEUTOPHILPCT 68.3 01/10/2022    MONOPCT 10.9 01/10/2022    BASOPCT 0.8 01/10/2022    NEUTROABS 4.14 01/10/2022    LYMPHSABS 1.09 (L) 01/10/2022    MONOSABS 0.66 01/10/2022    EOSABS 0.08 01/10/2022    BASOSABS 0.05 01/10/2022       Lab Results   Component Value Date     (L) 01/10/2022    K 4.4 01/10/2022    CL 94 (L) 01/10/2022    CO2 25 01/10/2022    BUN 7 01/10/2022    CREATININE 0.9 01/10/2022    GLUCOSE 117 (H) 01/10/2022    CALCIUM 9.9 01/10/2022    PROT 8.6 (H) 01/10/2022    LABALBU 5.2 01/10/2022    BILITOT 0.7 01/10/2022    ALKPHOS 90 01/10/2022    AST 89 (H) 01/10/2022    ALT 35 01/10/2022    LABGLOM >60 01/10/2022    GFRAA >60 01/10/2022       Lab Results   Component Value Date    IRON 140 01/26/2021    TIBC 355 01/26/2021    FERRITIN 214 01/26/2021           Radiology-    No results found. ASSESSMENT/PLAN :    There are no diagnoses linked to this encounter.    64 yo male  Reportedly history of stage IIIB colon cancer  S/p resection and adj FOLFOX x 12 with some DR at the end  Iatrogenic neuropathy    - Will request records from Dr. Neli Flynn including pathology (reported 2/31 LNs+)  - He is due for CT A/P and this is ordered today  - Will need referral at next visit for colonoscopy  - CBC/CMP/CEA today  - RTC in 1 months to review results, then q 3 months with port flush q ~6 weeks    1/19/21  - Stage III B colon cancer s/p resection and 12 cycles adj FOLFOX  - CT A/P with no evidence of recurrence of metastatic disease. Diffuse hepatic steatosis was noted  - CBC and CMP was WNL with the exception of elevated LFTs, likely due to the steatosis  - CEA 7.4, will trend  - Refer to Dr. Estiven Meade for colonoscopy surveillance  - PORT flush q 6 weeks  - RTC in 3 months with repeat labs    4/20/21  - Stage III B colon cancer s/p resection and 12 cycles adj FOLFOX  - CT A/P as above  - CBC and CMP was WNL with the exception of elevated LFTs (though improved from prior), likely due to the steatosis  - CEA up from 7.4 to 8.2. Still smoking 1 ppd, will work on quitting   - Referred to Dr. Estiven Meade for colonoscopy surveillance  - PORT flush q 6 weeks  - RTC in 3 months with repeat labs at CT A/P    7/13/21  - Stage III B colon cancer s/p resection and 12 cycles adj FOLFOX (6/20)  - CT A/P negative for recurrence, hepatic steatosis noted  - CBC and CMP was WNL. Elevated LFTs further improved, likely due to the steatosis  - CEA down to 7.0. Still smoking 1 ppd, discussed cessation again  - Dr. Estiven Meade for colonoscopy surveillance  - PORT flush q 6 weeks  - RTC in 3 months    10/12/21  - Stage III B colon cancer s/p resection and 12 cycles adj FOLFOX (6/20)  - CT A/P due prior to follow up, ordered  - CBC and CMP was WNL. Elevated LFTs improved further  - CEA down to 6.6. Still smoking, but down to a pack q 2 days, cessation counseling provided  - Dr. Estiven Meade for colonoscopy surveillance  - PORT flush q 6 weeks. Can have out at 2 years  - RTC in 3 months    1/11/22  - Stage III B colon cancer s/p resection and 12 cycles adj FOLFOX (6/20)  - CT A/P without evidence of recurrence   - CBC WNL. CMP stable  - CEA stable at 6.6. Still smoking ~10 cigs a day. Further discussed cessation and ways to cut down and eventually quit  - Dr. Estiven Meade for colonoscopy surveillance  - PORT flush q 6 weeks.  Can have out at 2 years  - RTC in 3 months    Jonathan Fishman MD   Electronically signed 1/11/2022 at 11:32 AM

## 2022-02-08 ENCOUNTER — OFFICE VISIT (OUTPATIENT)
Dept: FAMILY MEDICINE CLINIC | Age: 62
End: 2022-02-08
Payer: COMMERCIAL

## 2022-02-08 VITALS
TEMPERATURE: 97.9 F | BODY MASS INDEX: 20.73 KG/M2 | DIASTOLIC BLOOD PRESSURE: 69 MMHG | HEIGHT: 71 IN | WEIGHT: 148.1 LBS | SYSTOLIC BLOOD PRESSURE: 129 MMHG | RESPIRATION RATE: 16 BRPM | OXYGEN SATURATION: 97 % | HEART RATE: 94 BPM

## 2022-02-08 DIAGNOSIS — G62.0 NEUROPATHY DUE TO CHEMOTHERAPEUTIC DRUG (HCC): ICD-10-CM

## 2022-02-08 DIAGNOSIS — I10 ESSENTIAL HYPERTENSION: Primary | ICD-10-CM

## 2022-02-08 DIAGNOSIS — T45.1X5A NEUROPATHY DUE TO CHEMOTHERAPEUTIC DRUG (HCC): ICD-10-CM

## 2022-02-08 DIAGNOSIS — C77.2 COLON CANCER METASTASIZED TO INTRA-ABDOMINAL LYMPH NODE (HCC): ICD-10-CM

## 2022-02-08 DIAGNOSIS — F17.200 SMOKER: ICD-10-CM

## 2022-02-08 DIAGNOSIS — C18.9 COLON CANCER METASTASIZED TO INTRA-ABDOMINAL LYMPH NODE (HCC): ICD-10-CM

## 2022-02-08 PROCEDURE — 99214 OFFICE O/P EST MOD 30 MIN: CPT | Performed by: INTERNAL MEDICINE

## 2022-02-08 PROCEDURE — G8420 CALC BMI NORM PARAMETERS: HCPCS | Performed by: INTERNAL MEDICINE

## 2022-02-08 PROCEDURE — 3017F COLORECTAL CA SCREEN DOC REV: CPT | Performed by: INTERNAL MEDICINE

## 2022-02-08 PROCEDURE — G8484 FLU IMMUNIZE NO ADMIN: HCPCS | Performed by: INTERNAL MEDICINE

## 2022-02-08 PROCEDURE — G8427 DOCREV CUR MEDS BY ELIG CLIN: HCPCS | Performed by: INTERNAL MEDICINE

## 2022-02-08 PROCEDURE — 4004F PT TOBACCO SCREEN RCVD TLK: CPT | Performed by: INTERNAL MEDICINE

## 2022-02-08 SDOH — ECONOMIC STABILITY: FOOD INSECURITY: WITHIN THE PAST 12 MONTHS, THE FOOD YOU BOUGHT JUST DIDN'T LAST AND YOU DIDN'T HAVE MONEY TO GET MORE.: NEVER TRUE

## 2022-02-08 SDOH — ECONOMIC STABILITY: FOOD INSECURITY: WITHIN THE PAST 12 MONTHS, YOU WORRIED THAT YOUR FOOD WOULD RUN OUT BEFORE YOU GOT MONEY TO BUY MORE.: NEVER TRUE

## 2022-02-08 ASSESSMENT — PATIENT HEALTH QUESTIONNAIRE - PHQ9
SUM OF ALL RESPONSES TO PHQ QUESTIONS 1-9: 0
2. FEELING DOWN, DEPRESSED OR HOPELESS: 0
SUM OF ALL RESPONSES TO PHQ QUESTIONS 1-9: 0
SUM OF ALL RESPONSES TO PHQ QUESTIONS 1-9: 0
SUM OF ALL RESPONSES TO PHQ9 QUESTIONS 1 & 2: 0
1. LITTLE INTEREST OR PLEASURE IN DOING THINGS: 0
SUM OF ALL RESPONSES TO PHQ QUESTIONS 1-9: 0

## 2022-02-08 ASSESSMENT — SOCIAL DETERMINANTS OF HEALTH (SDOH): HOW HARD IS IT FOR YOU TO PAY FOR THE VERY BASICS LIKE FOOD, HOUSING, MEDICAL CARE, AND HEATING?: NOT HARD AT ALL

## 2022-02-08 NOTE — ASSESSMENT & PLAN NOTE
Counseled in detail,  Declines bupropion  Declines Chantix- daughter had bad experience  Considering nicotine gum  Declines referral for CBT  Has quit cold turkey in past, he will work on it

## 2022-02-08 NOTE — PROGRESS NOTES
2/8/2022  Visit type: Established patient    Reason for Visit: Hypertension (pt here to f/u with BP: no other issues) and Health Maintenance (pt declines Flu vaccine)      ASSESSMENT AND PLAN     1. Essential hypertension  Assessment & Plan:  Stable continue amlodipine      2. Colon cancer metastasized to intra-abdominal lymph node ([facility]  Stable under care of oncologist  3. Neuropathy due to chemotherapeutic drug [facility])  Assessment & Plan:   Pain is stable and tolerable on gabapentin  4. Smoker  Assessment & Plan:  Counseled in detail,  Declines bupropion  Declines Chantix- daughter had bad experience  Considering nicotine gum  Declines referral for CBT  Has quit cold turkey in past, he will work on it       ----------------------------------------------------------------------    SUBJECTIVE    Chief Complaint   Patient presents with    Hypertension     pt here to f/u with BP: no other issues    Health Maintenance     pt declines Flu vaccine     HPI   Follow up hypertension     Feeling well, drinking  0-2 beers per day    Smoking <ppd    Review of Systems   Denies fever, chills, chest pain, and shortness of breath     OBJECTIVE    /69 (Site: Left Upper Arm, Position: Sitting, Cuff Size: Medium Adult)   Pulse 94   Temp 97.9 °F (36.6 °C) (Temporal)   Resp 16   Ht 5' 11\" (1.803 m)   Wt 148 lb 1.6 oz (67.2 kg)   SpO2 97%   BMI 20.66 kg/m²   Physical Exam  Constitutional:       General: He is not in acute distress. HENT:      Mouth/Throat:      Pharynx: Oropharynx is clear. Cardiovascular:      Rate and Rhythm: Normal rate and regular rhythm. Heart sounds: Normal heart sounds. No murmur heard. No friction rub. No gallop. Pulmonary:      Breath sounds: Normal breath sounds. No stridor. No wheezing, rhonchi or rales. Musculoskeletal:      Right lower leg: No edema. Left lower leg: No edema. Neurological:      General: No focal deficit present. Mental Status: He is alert. ---------------------------------------------------------------------------

## 2022-02-22 ENCOUNTER — HOSPITAL ENCOUNTER (OUTPATIENT)
Dept: INFUSION THERAPY | Age: 62
Discharge: HOME | End: 2022-02-22
Payer: COMMERCIAL

## 2022-02-22 DIAGNOSIS — C77.2 COLON CANCER METASTASIZED TO INTRA-ABDOMINAL LYMPH NODE (HCC): Primary | ICD-10-CM

## 2022-02-22 DIAGNOSIS — C18.9 COLON CANCER METASTASIZED TO INTRA-ABDOMINAL LYMPH NODE (HCC): Primary | ICD-10-CM

## 2022-02-22 PROCEDURE — 6360000002 HC RX W HCPCS: Performed by: INTERNAL MEDICINE

## 2022-02-22 PROCEDURE — 2580000003 HC RX 258: Performed by: INTERNAL MEDICINE

## 2022-02-22 PROCEDURE — 96523 IRRIG DRUG DELIVERY DEVICE: CPT

## 2022-02-22 RX ORDER — SODIUM CHLORIDE 9 MG/ML
25 INJECTION, SOLUTION INTRAVENOUS PRN
Status: CANCELLED | OUTPATIENT
Start: 2022-02-22

## 2022-02-22 RX ORDER — HEPARIN SODIUM (PORCINE) LOCK FLUSH IV SOLN 100 UNIT/ML 100 UNIT/ML
500 SOLUTION INTRAVENOUS PRN
Status: DISCONTINUED | OUTPATIENT
Start: 2022-02-22 | End: 2022-02-23 | Stop reason: HOSPADM

## 2022-02-22 RX ORDER — HEPARIN SODIUM (PORCINE) LOCK FLUSH IV SOLN 100 UNIT/ML 100 UNIT/ML
500 SOLUTION INTRAVENOUS PRN
Status: CANCELLED | OUTPATIENT
Start: 2022-02-22

## 2022-02-22 RX ORDER — SODIUM CHLORIDE 0.9 % (FLUSH) 0.9 %
5-40 SYRINGE (ML) INJECTION PRN
Status: CANCELLED | OUTPATIENT
Start: 2022-02-22

## 2022-02-22 RX ORDER — SODIUM CHLORIDE 0.9 % (FLUSH) 0.9 %
5-40 SYRINGE (ML) INJECTION PRN
Status: DISCONTINUED | OUTPATIENT
Start: 2022-02-22 | End: 2022-02-23 | Stop reason: HOSPADM

## 2022-02-22 RX ADMIN — Medication 500 UNITS: at 10:08

## 2022-02-22 RX ADMIN — SODIUM CHLORIDE, PRESERVATIVE FREE 10 ML: 5 INJECTION INTRAVENOUS at 10:08

## 2022-02-22 NOTE — PROGRESS NOTES
PORT FLUSH    Patient presents to clinic for [facility] today. L  SQ port accessed per policy using 43F 6.16ON Wagner needle for good blood return. Aspirate for waste and specimen sent to lab. Site flushed easily with 10 mL NSS followed by 5 mL Heparin solution 100 units/ml rinse prior to de-access. Dry sterile dressing to area. Tolerated procedure well. Encouraged to schedule port flush every 4 weeks.

## 2022-04-11 ENCOUNTER — HOSPITAL ENCOUNTER (OUTPATIENT)
Dept: INFUSION THERAPY | Age: 62
Discharge: HOME | End: 2022-04-11
Payer: COMMERCIAL

## 2022-04-11 DIAGNOSIS — C77.2 COLON CANCER METASTASIZED TO INTRA-ABDOMINAL LYMPH NODE (HCC): Primary | ICD-10-CM

## 2022-04-11 DIAGNOSIS — C18.9 COLON CANCER METASTASIZED TO INTRA-ABDOMINAL LYMPH NODE (HCC): Primary | ICD-10-CM

## 2022-04-11 LAB
ALBUMIN SERPL-MCNC: 4.8 G/DL (ref 3.5–5.2)
ALP BLD-CCNC: 74 U/L (ref 40–129)
ALT SERPL-CCNC: 13 U/L (ref 0–40)
ANION GAP SERPL CALCULATED.3IONS-SCNC: 14 MMOL/L (ref 7–16)
AST SERPL-CCNC: 32 U/L (ref 0–39)
BASOPHILS ABSOLUTE: 0.06 E9/L (ref 0–0.2)
BASOPHILS RELATIVE PERCENT: 1 % (ref 0–2)
BILIRUB SERPL-MCNC: 0.5 MG/DL (ref 0–1.2)
BUN BLDV-MCNC: 8 MG/DL (ref 6–23)
CALCIUM SERPL-MCNC: 9.7 MG/DL (ref 8.6–10.2)
CHLORIDE BLD-SCNC: 96 MMOL/L (ref 98–107)
CO2: 24 MMOL/L (ref 22–29)
CREAT SERPL-MCNC: 1 MG/DL (ref 0.7–1.2)
EOSINOPHILS ABSOLUTE: 0.1 E9/L (ref 0.05–0.5)
EOSINOPHILS RELATIVE PERCENT: 1.7 % (ref 0–6)
GFR AFRICAN AMERICAN: >60
GFR NON-AFRICAN AMERICAN: >60 ML/MIN/1.73
GLUCOSE BLD-MCNC: 111 MG/DL (ref 74–99)
HCT VFR BLD CALC: 39.2 % (ref 37–54)
HEMOGLOBIN: 13.5 G/DL (ref 12.5–16.5)
IMMATURE GRANULOCYTES #: 0.03 E9/L
IMMATURE GRANULOCYTES %: 0.5 % (ref 0–5)
LYMPHOCYTES ABSOLUTE: 1.14 E9/L (ref 1.5–4)
LYMPHOCYTES RELATIVE PERCENT: 19.5 % (ref 20–42)
MCH RBC QN AUTO: 33.3 PG (ref 26–35)
MCHC RBC AUTO-ENTMCNC: 34.4 % (ref 32–34.5)
MCV RBC AUTO: 96.6 FL (ref 80–99.9)
MONOCYTES ABSOLUTE: 0.56 E9/L (ref 0.1–0.95)
MONOCYTES RELATIVE PERCENT: 9.6 % (ref 2–12)
NEUTROPHILS ABSOLUTE: 3.95 E9/L (ref 1.8–7.3)
NEUTROPHILS RELATIVE PERCENT: 67.7 % (ref 43–80)
PDW BLD-RTO: 13 FL (ref 11.5–15)
PLATELET # BLD: 247 E9/L (ref 130–450)
PMV BLD AUTO: 8.8 FL (ref 7–12)
POTASSIUM SERPL-SCNC: 4.2 MMOL/L (ref 3.5–5)
RBC # BLD: 4.06 E12/L (ref 3.8–5.8)
SODIUM BLD-SCNC: 134 MMOL/L (ref 132–146)
TOTAL PROTEIN: 8 G/DL (ref 6.4–8.3)
WBC # BLD: 5.8 E9/L (ref 4.5–11.5)

## 2022-04-11 PROCEDURE — 2580000003 HC RX 258: Performed by: INTERNAL MEDICINE

## 2022-04-11 PROCEDURE — 85025 COMPLETE CBC W/AUTO DIFF WBC: CPT

## 2022-04-11 PROCEDURE — 82378 CARCINOEMBRYONIC ANTIGEN: CPT

## 2022-04-11 PROCEDURE — 80053 COMPREHEN METABOLIC PANEL: CPT

## 2022-04-11 PROCEDURE — 36591 DRAW BLOOD OFF VENOUS DEVICE: CPT

## 2022-04-11 PROCEDURE — 6360000002 HC RX W HCPCS: Performed by: INTERNAL MEDICINE

## 2022-04-11 RX ORDER — SODIUM CHLORIDE 0.9 % (FLUSH) 0.9 %
5-40 SYRINGE (ML) INJECTION PRN
Status: CANCELLED | OUTPATIENT
Start: 2022-04-11

## 2022-04-11 RX ORDER — SODIUM CHLORIDE 0.9 % (FLUSH) 0.9 %
5-40 SYRINGE (ML) INJECTION PRN
Status: DISCONTINUED | OUTPATIENT
Start: 2022-04-11 | End: 2022-04-12 | Stop reason: HOSPADM

## 2022-04-11 RX ORDER — SODIUM CHLORIDE 9 MG/ML
25 INJECTION, SOLUTION INTRAVENOUS PRN
Status: CANCELLED | OUTPATIENT
Start: 2022-04-11

## 2022-04-11 RX ORDER — HEPARIN SODIUM (PORCINE) LOCK FLUSH IV SOLN 100 UNIT/ML 100 UNIT/ML
500 SOLUTION INTRAVENOUS PRN
Status: CANCELLED | OUTPATIENT
Start: 2022-04-11

## 2022-04-11 RX ORDER — HEPARIN SODIUM (PORCINE) LOCK FLUSH IV SOLN 100 UNIT/ML 100 UNIT/ML
500 SOLUTION INTRAVENOUS PRN
Status: DISCONTINUED | OUTPATIENT
Start: 2022-04-11 | End: 2022-04-12 | Stop reason: HOSPADM

## 2022-04-11 RX ADMIN — Medication 500 UNITS: at 10:11

## 2022-04-11 RX ADMIN — SODIUM CHLORIDE, PRESERVATIVE FREE 10 ML: 5 INJECTION INTRAVENOUS at 10:11

## 2022-04-11 NOTE — PROGRESS NOTES
Patient presents to clinic for labs today. chest  SQ port accessed per policy using 20 gauge 2.18 inch Wagner needle for good blood return. Aspirate for waste and specimen sent to lab. Site flushed easily with 10 mL NSS followed by 5 mL Heparin solution 100 units/ml rinse prior to de-access. Dry sterile dressing to area. Tolerated procedure well. Encouraged to schedule port flush every 4 weeks.

## 2022-04-12 ENCOUNTER — OFFICE VISIT (OUTPATIENT)
Dept: ONCOLOGY | Age: 62
End: 2022-04-12
Payer: COMMERCIAL

## 2022-04-12 VITALS
HEART RATE: 118 BPM | TEMPERATURE: 99.2 F | SYSTOLIC BLOOD PRESSURE: 141 MMHG | HEIGHT: 71 IN | BODY MASS INDEX: 20.44 KG/M2 | DIASTOLIC BLOOD PRESSURE: 88 MMHG | WEIGHT: 146 LBS | OXYGEN SATURATION: 100 %

## 2022-04-12 DIAGNOSIS — C18.9 COLON CANCER METASTASIZED TO INTRA-ABDOMINAL LYMPH NODE (HCC): Primary | ICD-10-CM

## 2022-04-12 DIAGNOSIS — C77.2 COLON CANCER METASTASIZED TO INTRA-ABDOMINAL LYMPH NODE (HCC): Primary | ICD-10-CM

## 2022-04-12 LAB — CEA: 5.5 NG/ML (ref 0–5.2)

## 2022-04-12 PROCEDURE — 99213 OFFICE O/P EST LOW 20 MIN: CPT

## 2022-04-12 NOTE — PROGRESS NOTES
[address]  [facility]   Hematology/Oncology  Consult      Patient Name: Rica Gutierrez  YOB: 1960  PCP: Morelia Waldrop MD   Referring Provider:      Reason for Consultation:   Chief Complaint   Patient presents with    Colon Cancer        Subjective:  Feels well today, no issues in interim. BMs unchanged, no bleeding, no weight loss. Neuropathy stable. Still smoking, but now under a 1/2 ppd. No new issues    History of Present Illness: This pt is a pleasant 64 yo male who was diagnosed with colon cancer in 11/19 (reportedly stage IIIB), s/p resection and 2/31 LNs positive, and reportedly received adj chemotherapy and has chronic neuropathy from this. No records are available for review today, however he states he received 8 cycles of full dose FOLFOX and 4 cycles of DR FOLFOX. He still has his PORT. He has no acute complaints today, including abdominal pain, change in bowel habits, pain with defecation, tenesmus, hematochezia or melena. He has maintained a stable weight.  He overall feels well    Diagnostic Data:     Past Medical History:   Diagnosis Date    Cataract     bilateral    Colon cancer ([address]     Hypertension     Neuropathy     finger/feet from chemotherapy       Patient Active Problem List    Diagnosis Date Noted    Abnormal LFTs 03/25/2021    Alcohol use 03/25/2021    Colon cancer metastasized to intra-abdominal lymph node ([address] 12/17/2020    Neuropathy due to chemotherapeutic drug ([address] 12/17/2020    Essential hypertension 12/17/2020    Smoker 12/17/2020        Past Surgical History:   Procedure Laterality Date    COLON SURGERY  2019    EYE SURGERY Bilateral     SUBTOTAL COLECTOMY  11/21/2019       Family History  Family History   Problem Relation Age of Onset    Stroke Mother     High Blood Pressure Mother     Heart Disease Father     Heart Attack Father     Kidney Disease Sister     Cancer Brother         Prostate  No Known Problems Maternal Aunt     No Known Problems Maternal Uncle     No Known Problems Paternal Aunt     No Known Problems Paternal Uncle     No Known Problems Maternal Grandmother     No Known Problems Maternal Grandfather     No Known Problems Paternal Grandmother     No Known Problems Paternal Grandfather     No Known Problems Maternal Cousin     No Known Problems Paternal Cousin     No Known Problems Brother     Anxiety Disorder Daughter     No Known Problems Grandchild        Social History    TOBACCO:   reports that he has been smoking cigarettes. He started smoking about 44 years ago. He has a 40.00 pack-year smoking history. He has never used smokeless tobacco.  ETOH:   reports current alcohol use of about 70.0 standard drinks of alcohol per week. Home Medications  Prior to Admission medications    Medication Sig Start Date End Date Taking? Authorizing Provider   gabapentin (NEURONTIN) 100 MG capsule Take 1 capsule by mouth 3 times daily for 360 days. 6/29/21 6/24/22 Yes Ashok Daniels MD   amLODIPine (NORVASC) 10 MG tablet Take 1 tablet by mouth daily 6/29/21  Yes Ashok Daniels MD   Multiple Vitamins-Minerals (MULTIVITAMIN GUMMIES MENS) CHEW Take 1 tablet by mouth daily as needed   Yes Historical Provider, MD       Allergies  No Known Allergies    Review of Systems:    + for neuropathy of BL U/LE      Objective  BP (!) 141/88   Pulse 118   Temp 99.2 °F (37.3 °C)   Ht 5' 11\" (1.803 m)   Wt 146 lb (66.2 kg)   SpO2 100%   BMI 20.36 kg/m²     Physical Performance Status    Physical Exam:  General: AAO to person, place, time, and purpose, appears stated age, cooperative, no acute distress, pleasant   Head and neck : PERRLA, EOMI . Sclera non icteric.   Oropharynx : Clear  Neck: no JVD,  no adenopathy  LYMPHATICS : No LAD  Lungs: Clear to auscultation   Heart: Regular rate and regular rhythm, no murmur, normal S1, S2  Abdomen: Soft, non-tender;no masses, no organomegaly  Extremities: No edema,no cyanosis, no clubbing. Skin:  No rash. Neurologic:Cranial nerves grossly intact. No focal motor or sensory deficits . Recent Laboratory Data-   Lab Results   Component Value Date    WBC 5.8 04/11/2022    HGB 13.5 04/11/2022    HCT 39.2 04/11/2022    MCV 96.6 04/11/2022     04/11/2022    LYMPHOPCT 19.5 (L) 04/11/2022    RBC 4.06 04/11/2022    MCH 33.3 04/11/2022    MCHC 34.4 04/11/2022    RDW 13.0 04/11/2022    NEUTOPHILPCT 67.7 04/11/2022    MONOPCT 9.6 04/11/2022    BASOPCT 1.0 04/11/2022    NEUTROABS 3.95 04/11/2022    LYMPHSABS 1.14 (L) 04/11/2022    MONOSABS 0.56 04/11/2022    EOSABS 0.10 04/11/2022    BASOSABS 0.06 04/11/2022       Lab Results   Component Value Date     04/11/2022    K 4.2 04/11/2022    CL 96 (L) 04/11/2022    CO2 24 04/11/2022    BUN 8 04/11/2022    CREATININE 1.0 04/11/2022    GLUCOSE 111 (H) 04/11/2022    CALCIUM 9.7 04/11/2022    PROT 8.0 04/11/2022    LABALBU 4.8 04/11/2022    BILITOT 0.5 04/11/2022    ALKPHOS 74 04/11/2022    AST 32 04/11/2022    ALT 13 04/11/2022    LABGLOM >60 04/11/2022    GFRAA >60 04/11/2022       Lab Results   Component Value Date    IRON 140 01/26/2021    TIBC 355 01/26/2021    FERRITIN 214 01/26/2021           Radiology-    No results found. ASSESSMENT/PLAN :    Aaron Moseley was seen today for colon cancer.     Diagnoses and all orders for this visit:    Colon cancer metastasized to intra-abdominal lymph node ([address]       64 yo male  Reportedly history of stage IIIB colon cancer  S/p resection and adj FOLFOX x 12 with some DR at the end  Iatrogenic neuropathy    - Will request records from Dr. Cyn Lugo including pathology (reported 2/31 LNs+)  - He is due for CT A/P and this is ordered today  - Will need referral at next visit for colonoscopy  - CBC/CMP/CEA today  - RTC in 1 months to review results, then q 3 months with port flush q ~6 weeks    1/19/21  - Stage III B colon cancer s/p resection and 12 cycles adj FOLFOX  - CT A/P with no evidence of recurrence of metastatic disease. Diffuse hepatic steatosis was noted  - CBC and CMP was WNL with the exception of elevated LFTs, likely due to the steatosis  - CEA 7.4, will trend  - Refer to Dr. Fred Sawyer for colonoscopy surveillance  - PORT flush q 6 weeks  - RTC in 3 months with repeat labs    4/20/21  - Stage III B colon cancer s/p resection and 12 cycles adj FOLFOX  - CT A/P as above  - CBC and CMP was WNL with the exception of elevated LFTs (though improved from prior), likely due to the steatosis  - CEA up from 7.4 to 8.2. Still smoking 1 ppd, will work on quitting   - Referred to Dr. Fred Sawyer for colonoscopy surveillance  - PORT flush q 6 weeks  - RTC in 3 months with repeat labs at CT A/P    7/13/21  - Stage III B colon cancer s/p resection and 12 cycles adj FOLFOX (6/20)  - CT A/P negative for recurrence, hepatic steatosis noted  - CBC and CMP was WNL. Elevated LFTs further improved, likely due to the steatosis  - CEA down to 7.0. Still smoking 1 ppd, discussed cessation again  - Dr. Fred Sawyer for colonoscopy surveillance  - PORT flush q 6 weeks  - RTC in 3 months    10/12/21  - Stage III B colon cancer s/p resection and 12 cycles adj FOLFOX (6/20)  - CT A/P due prior to follow up, ordered  - CBC and CMP was WNL. Elevated LFTs improved further  - CEA down to 6.6. Still smoking, but down to a pack q 2 days, cessation counseling provided  - Dr. Fred Sawyer for colonoscopy surveillance  - PORT flush q 6 weeks. Can have out at 2 years  - RTC in 3 months    1/11/22  - Stage III B colon cancer s/p resection and 12 cycles adj FOLFOX (6/20)  - CT A/P without evidence of recurrence   - CBC WNL. CMP stable  - CEA stable at 6.6. Still smoking ~10 cigs a day. Further discussed cessation and ways to cut down and eventually quit  - Dr. Fred Sawyer for colonoscopy surveillance  - PORT flush q 6 weeks.  Can have out at 2 years  - RTC in 3 months    4/12/22  - Stage III B colon cancer s/p resection and 12 cycles adj FOLFOX (6/20)  - CT A/P without evidence of recurrence (10/21). Repeat prior to follow up  - CBC WNL. CMP stable  - CEA pending today, previously 6.6. Smoking < than 10 cigs a day. Further discussed cessation and ways to cut down and eventually quit, he continues to work on this  - Dr. Dante Castelan for colonoscopy surveillance  - PORT flush q 6 weeks.  Can have out at 2 years  - RTC in 3 months    Gia Nogueira MD   Electronically signed 4/12/2022 at 10:07 AM

## 2022-04-29 ENCOUNTER — HOSPITAL ENCOUNTER (OUTPATIENT)
Dept: CT IMAGING | Age: 62
Discharge: HOME | End: 2022-04-29
Payer: COMMERCIAL

## 2022-04-29 DIAGNOSIS — C77.2 COLON CANCER METASTASIZED TO INTRA-ABDOMINAL LYMPH NODE (HCC): ICD-10-CM

## 2022-04-29 DIAGNOSIS — C18.9 COLON CANCER METASTASIZED TO INTRA-ABDOMINAL LYMPH NODE (HCC): ICD-10-CM

## 2022-04-29 PROCEDURE — 6360000004 HC RX CONTRAST MEDICATION: Performed by: RADIOLOGY

## 2022-04-29 PROCEDURE — 74177 CT ABD & PELVIS W/CONTRAST: CPT

## 2022-04-29 RX ADMIN — IOPAMIDOL 80 ML: 755 INJECTION, SOLUTION INTRAVENOUS at 07:38

## 2022-05-24 ENCOUNTER — HOSPITAL ENCOUNTER (OUTPATIENT)
Dept: INFUSION THERAPY | Age: 62
Discharge: HOME | End: 2022-05-24
Payer: MEDICARE

## 2022-05-24 DIAGNOSIS — C18.9 COLON CANCER METASTASIZED TO INTRA-ABDOMINAL LYMPH NODE (HCC): Primary | ICD-10-CM

## 2022-05-24 DIAGNOSIS — C77.2 COLON CANCER METASTASIZED TO INTRA-ABDOMINAL LYMPH NODE (HCC): Primary | ICD-10-CM

## 2022-05-24 PROCEDURE — 6360000002 HC RX W HCPCS: Performed by: INTERNAL MEDICINE

## 2022-05-24 PROCEDURE — 2580000003 HC RX 258: Performed by: INTERNAL MEDICINE

## 2022-05-24 PROCEDURE — 96523 IRRIG DRUG DELIVERY DEVICE: CPT

## 2022-05-24 RX ORDER — SODIUM CHLORIDE 9 MG/ML
25 INJECTION, SOLUTION INTRAVENOUS PRN
Status: CANCELLED | OUTPATIENT
Start: 2022-05-24

## 2022-05-24 RX ORDER — SODIUM CHLORIDE 0.9 % (FLUSH) 0.9 %
5-40 SYRINGE (ML) INJECTION PRN
Status: CANCELLED | OUTPATIENT
Start: 2022-05-24

## 2022-05-24 RX ORDER — HEPARIN SODIUM (PORCINE) LOCK FLUSH IV SOLN 100 UNIT/ML 100 UNIT/ML
500 SOLUTION INTRAVENOUS PRN
Status: DISCONTINUED | OUTPATIENT
Start: 2022-05-24 | End: 2022-05-25 | Stop reason: HOSPADM

## 2022-05-24 RX ORDER — SODIUM CHLORIDE 0.9 % (FLUSH) 0.9 %
5-40 SYRINGE (ML) INJECTION PRN
Status: DISCONTINUED | OUTPATIENT
Start: 2022-05-24 | End: 2022-05-25 | Stop reason: HOSPADM

## 2022-05-24 RX ORDER — HEPARIN SODIUM (PORCINE) LOCK FLUSH IV SOLN 100 UNIT/ML 100 UNIT/ML
500 SOLUTION INTRAVENOUS PRN
Status: CANCELLED | OUTPATIENT
Start: 2022-05-24

## 2022-05-24 RX ADMIN — SODIUM CHLORIDE, PRESERVATIVE FREE 10 ML: 5 INJECTION INTRAVENOUS at 10:23

## 2022-05-24 RX ADMIN — Medication 500 UNITS: at 10:23

## 2022-05-24 NOTE — PROGRESS NOTES
PORT FLUSH    Patient presents to clinic for [facility] today. chest  SQ port accessed per policy using 20 gauge 5.41 inch Wagner needle for good blood return. Aspirate for waste and specimen sent to lab. Site flushed easily with 10 mL NSS followed by 5 mL Heparin solution 100 units/ml rinse prior to de-access. Dry sterile dressing to area. Tolerated procedure well. Encouraged to schedule port flush every 4 weeks.

## 2022-05-24 NOTE — DISCHARGE INSTR - COC
Continuity of Care Form    Patient Name: Noe Mullins   :  1960  MRN:  [id number]    Admit date:  2022  Discharge date:  ***    Code Status Order: No Order   Advance Directives:      Admitting Physician:  No admitting provider for patient encounter. PCP: Kuldeep Bradshaw MD    Discharging Nurse: [facility] Unit/Room#: No information available for this encounter. Discharging Unit Phone Number: ***    Emergency Contact:   Extended Emergency Contact Information  Primary Emergency Contact: Ivan [facility] Phone: [phone]  Relation: Spouse    Past Surgical History:  Past Surgical History:   Procedure Laterality Date    COLON SURGERY      EYE SURGERY Bilateral     SUBTOTAL COLECTOMY  2019       Immunization History:   Immunization History   Administered Date(s) Administered    COVID-19, Abrahan Hampton, Primary or Immunocompromised, PF, 100mcg/0.5mL 2021, 2021, 2021       Active Problems:  Patient Active Problem List   Diagnosis Code    Colon cancer metastasized to intra-abdominal lymph node (HCC) C18.9, C77.2    Neuropathy due to chemotherapeutic drug ([facility] G62.0, T45.1X5A    Essential hypertension I10    Smoker F17.200    Abnormal LFTs R94.5    Alcohol use Z72.89       Isolation/Infection:   Isolation            No Isolation          Patient Infection Status       None to display            Nurse Assessment:  Last Vital Signs: There were no vitals taken for this visit.     Last documented pain score (0-10 scale):    Last Weight:   Wt Readings from Last 1 Encounters:   22 146 lb (66.2 kg)     Mental Status:  {IP PT MENTAL STATUS:85292}    IV Access:  [address] [facility] CANDY IV ACCESS:387478629}    Nursing Mobility/ADLs:  Walking   {CHP DME OWNA:344542170}  Transfer  {CHP DME PJLE:038968229}  Bathing  {CHP DME VDDA:299228044}  Dressing  {CHP DME XAEO:627385647}  Toileting  {CHP DME SHXL:134513375}  Feeding  {CHP DME SDNQ:096929545}  Med Admin  {CHP DME VWSL:096275660}  Med Delivery   [address] [facility] MED Delivery:570978302}    Wound Care Documentation and Therapy:        Elimination:  Continence: Bowel: {YES / PT:19810}  Bladder: {YES / ROXANNE:36161}  Urinary Catheter: {Urinary Catheter:255028894}   Colostomy/Ileostomy/Ileal Conduit: {YES / WD:99084}       Date of Last BM: ***  No intake or output data in the 24 hours ending 22 1025  No intake/output data recorded.     Safety Concerns:     [address] CANDY Safety Concerns:407049305}    Impairments/Disabilities:      [address] CANDY Impairments/Disabilities:714832545}    Nutrition Therapy:  Current Nutrition Therapy:   [address] [facility] Diet List:904275612}    Routes of Feeding: {CHP DME Other Feedings:972517151}  Liquids: {Slp liquid thickness:87402}  Daily Fluid Restriction: {CHP DME Yes amt example:634982134}  Last Modified Barium Swallow with Video (Video Swallowing Test): {Done Not Done UIN}    Treatments at the Time of Hospital Discharge:   Respiratory Treatments: ***  Oxygen Therapy:  {Therapy; copd oxygen:05402}  Ventilator:    {[facility] Vent RVKZ:568150515}    Rehab Therapies: {THERAPEUTIC INTERVENTION:4828320839}  Weight Bearing Status/Restrictions: [address]  Weight Bearin}  Other Medical Equipment (for information only, NOT a DME order):  {EQUIPMENT:832011901}  Other Treatments: ***    Patient's personal belongings (please select all that are sent with patient):  {CHP DME Belongings:379230877}    RN SIGNATURE:  {Esignature:323458683}    CASE MANAGEMENT/SOCIAL WORK SECTION    Inpatient Status Date: ***    Readmission Risk Assessment Score:  Readmission Risk              Risk of Unplanned Readmission:  0           Discharging to Facility/ Agency   Name:   Address:  Phone:  Fax:    Dialysis Facility (if applicable)   Name:  Address:  Dialysis Schedule:  Phone:  Fax:    / signature: {Esignature:246472660}    PHYSICIAN SECTION    Prognosis: {Prognosis:4366011536}    Condition at Discharge: [address] Patient Condition:132508366}    Rehab Potential (if transferring to Rehab): {Prognosis:7675287096}    Recommended Labs or Other Treatments After Discharge: ***    Physician Certification: I certify the above information and transfer of Noe Mullins  is necessary for the continuing treatment of the diagnosis listed and that he requires {Admit to Appropriate Level of Care:73417} for {GREATER/LESS:329342291} 30 days.      Update Admission H&P: {CHP DME Changes in XMHDX:558038216}    PHYSICIAN SIGNATURE:  {Esignature:621901278}

## 2022-06-20 DIAGNOSIS — I10 ESSENTIAL HYPERTENSION: ICD-10-CM

## 2022-06-20 DIAGNOSIS — G62.0 NEUROPATHY DUE TO CHEMOTHERAPEUTIC DRUG (HCC): ICD-10-CM

## 2022-06-20 DIAGNOSIS — T45.1X5A NEUROPATHY DUE TO CHEMOTHERAPEUTIC DRUG (HCC): ICD-10-CM

## 2022-06-24 RX ORDER — GABAPENTIN 100 MG/1
CAPSULE ORAL
Qty: 270 CAPSULE | Refills: 0 | Status: SHIPPED | OUTPATIENT
Start: 2022-06-24 | End: 2022-09-22

## 2022-06-24 RX ORDER — AMLODIPINE BESYLATE 10 MG/1
TABLET ORAL
Qty: 90 TABLET | Refills: 0 | Status: SHIPPED
Start: 2022-06-24 | End: 2022-08-09 | Stop reason: SDUPTHER

## 2022-07-12 ENCOUNTER — OFFICE VISIT (OUTPATIENT)
Dept: ONCOLOGY | Age: 62
End: 2022-07-12
Payer: MEDICARE

## 2022-07-12 ENCOUNTER — HOSPITAL ENCOUNTER (OUTPATIENT)
Dept: INFUSION THERAPY | Age: 62
Discharge: HOME | End: 2022-07-12
Payer: MEDICARE

## 2022-07-12 VITALS
BODY MASS INDEX: 20.77 KG/M2 | HEART RATE: 102 BPM | TEMPERATURE: 97.7 F | DIASTOLIC BLOOD PRESSURE: 76 MMHG | WEIGHT: 148.4 LBS | OXYGEN SATURATION: 100 % | HEIGHT: 71 IN | SYSTOLIC BLOOD PRESSURE: 137 MMHG

## 2022-07-12 DIAGNOSIS — C77.2 COLON CANCER METASTASIZED TO INTRA-ABDOMINAL LYMPH NODE (HCC): Primary | ICD-10-CM

## 2022-07-12 DIAGNOSIS — C18.9 COLON CANCER METASTASIZED TO INTRA-ABDOMINAL LYMPH NODE (HCC): Primary | ICD-10-CM

## 2022-07-12 LAB
ALBUMIN SERPL-MCNC: 5.1 G/DL (ref 3.5–5.2)
ALP BLD-CCNC: 85 U/L (ref 40–129)
ALT SERPL-CCNC: 52 U/L (ref 0–40)
ANION GAP SERPL CALCULATED.3IONS-SCNC: 17 MMOL/L (ref 7–16)
AST SERPL-CCNC: 123 U/L (ref 0–39)
BASOPHILS ABSOLUTE: 0.06 E9/L (ref 0–0.2)
BASOPHILS RELATIVE PERCENT: 1.2 % (ref 0–2)
BILIRUB SERPL-MCNC: 0.6 MG/DL (ref 0–1.2)
BUN BLDV-MCNC: 7 MG/DL (ref 6–23)
CALCIUM SERPL-MCNC: 9.5 MG/DL (ref 8.6–10.2)
CEA: 7.5 NG/ML (ref 0–5.2)
CHLORIDE BLD-SCNC: 94 MMOL/L (ref 98–107)
CO2: 22 MMOL/L (ref 22–29)
CREAT SERPL-MCNC: 0.9 MG/DL (ref 0.7–1.2)
EOSINOPHILS ABSOLUTE: 0.07 E9/L (ref 0.05–0.5)
EOSINOPHILS RELATIVE PERCENT: 1.4 % (ref 0–6)
GFR AFRICAN AMERICAN: >60
GFR NON-AFRICAN AMERICAN: >60 ML/MIN/1.73
GLUCOSE BLD-MCNC: 76 MG/DL (ref 74–99)
HCT VFR BLD CALC: 38.8 % (ref 37–54)
HEMOGLOBIN: 13.4 G/DL (ref 12.5–16.5)
IMMATURE GRANULOCYTES #: 0.03 E9/L
IMMATURE GRANULOCYTES %: 0.6 % (ref 0–5)
LYMPHOCYTES ABSOLUTE: 0.87 E9/L (ref 1.5–4)
LYMPHOCYTES RELATIVE PERCENT: 17.5 % (ref 20–42)
MCH RBC QN AUTO: 33.3 PG (ref 26–35)
MCHC RBC AUTO-ENTMCNC: 34.5 % (ref 32–34.5)
MCV RBC AUTO: 96.3 FL (ref 80–99.9)
MONOCYTES ABSOLUTE: 0.51 E9/L (ref 0.1–0.95)
MONOCYTES RELATIVE PERCENT: 10.3 % (ref 2–12)
NEUTROPHILS ABSOLUTE: 3.43 E9/L (ref 1.8–7.3)
NEUTROPHILS RELATIVE PERCENT: 69 % (ref 43–80)
PDW BLD-RTO: 13.3 FL (ref 11.5–15)
PLATELET # BLD: 210 E9/L (ref 130–450)
PMV BLD AUTO: 8.7 FL (ref 7–12)
POTASSIUM SERPL-SCNC: 4.1 MMOL/L (ref 3.5–5)
RBC # BLD: 4.03 E12/L (ref 3.8–5.8)
SODIUM BLD-SCNC: 133 MMOL/L (ref 132–146)
TOTAL PROTEIN: 8.1 G/DL (ref 6.4–8.3)
WBC # BLD: 5 E9/L (ref 4.5–11.5)

## 2022-07-12 PROCEDURE — 82378 CARCINOEMBRYONIC ANTIGEN: CPT

## 2022-07-12 PROCEDURE — 6360000002 HC RX W HCPCS: Performed by: INTERNAL MEDICINE

## 2022-07-12 PROCEDURE — 80053 COMPREHEN METABOLIC PANEL: CPT

## 2022-07-12 PROCEDURE — 99214 OFFICE O/P EST MOD 30 MIN: CPT

## 2022-07-12 PROCEDURE — 2580000003 HC RX 258: Performed by: INTERNAL MEDICINE

## 2022-07-12 PROCEDURE — 85025 COMPLETE CBC W/AUTO DIFF WBC: CPT

## 2022-07-12 RX ORDER — SODIUM CHLORIDE 0.9 % (FLUSH) 0.9 %
5-40 SYRINGE (ML) INJECTION PRN
OUTPATIENT
Start: 2022-07-12

## 2022-07-12 RX ORDER — SODIUM CHLORIDE 0.9 % (FLUSH) 0.9 %
5-40 SYRINGE (ML) INJECTION PRN
Status: DISCONTINUED | OUTPATIENT
Start: 2022-07-12 | End: 2022-07-13 | Stop reason: HOSPADM

## 2022-07-12 RX ORDER — HEPARIN SODIUM (PORCINE) LOCK FLUSH IV SOLN 100 UNIT/ML 100 UNIT/ML
500 SOLUTION INTRAVENOUS PRN
OUTPATIENT
Start: 2022-07-12

## 2022-07-12 RX ORDER — HEPARIN SODIUM (PORCINE) LOCK FLUSH IV SOLN 100 UNIT/ML 100 UNIT/ML
500 SOLUTION INTRAVENOUS PRN
Status: DISCONTINUED | OUTPATIENT
Start: 2022-07-12 | End: 2022-07-13 | Stop reason: HOSPADM

## 2022-07-12 RX ORDER — SODIUM CHLORIDE 9 MG/ML
25 INJECTION, SOLUTION INTRAVENOUS PRN
OUTPATIENT
Start: 2022-07-12

## 2022-07-12 RX ADMIN — HEPARIN 500 UNITS: 100 SYRINGE at 10:16

## 2022-07-12 RX ADMIN — SODIUM CHLORIDE, PRESERVATIVE FREE 10 ML: 5 INJECTION INTRAVENOUS at 10:15

## 2022-07-12 NOTE — PROGRESS NOTES
[address]  [place] , [facility]   Hematology/Oncology  Consult      Patient Name: Xochilt Brambila  YOB: 1960  PCP: Paula Hightower MD   Referring Provider:      Reason for Consultation:   Chief Complaint   Patient presents with    Cancer     Colon cancer metastasized to intr-abdominal lymph node [facility])    Follow-up        Subjective:  Feels well today, no acute complaints or issues in interim. BMs unchanged, no bleeding, no weight loss. Neuropathy stable. Still smoking, but now under a 1/2 ppd, most of the time lights them and then lets them burn out    History of Present Illness: This pt is a pleasant 62 yo male who was diagnosed with colon cancer in 11/19 (reportedly stage IIIB), s/p resection and 2/31 LNs positive, and reportedly received adj chemotherapy and has chronic neuropathy from this. No records are available for review today, however he states he received 8 cycles of full dose FOLFOX and 4 cycles of DR FOLFOX. He still has his PORT. He has no acute complaints today, including abdominal pain, change in bowel habits, pain with defecation, tenesmus, hematochezia or melena. He has maintained a stable weight.  He overall feels well    Diagnostic Data:     Past Medical History:   Diagnosis Date    Cataract     bilateral    Colon cancer ([address]     Hypertension     Neuropathy     finger/feet from chemotherapy       Patient Active Problem List    Diagnosis Date Noted    Abnormal LFTs 03/25/2021    Alcohol use 03/25/2021    Colon cancer metastasized to intra-abdominal lymph node ([address] 12/17/2020    Neuropathy due to chemotherapeutic drug ([address] 12/17/2020    Essential hypertension 12/17/2020    Smoker 12/17/2020        Past Surgical History:   Procedure Laterality Date    COLON SURGERY  2019    EYE SURGERY Bilateral     SUBTOTAL COLECTOMY  11/21/2019       Family History  Family History   Problem Relation Age of Onset    Stroke Mother     High Blood Pressure Mother     Heart Disease Father     Heart Attack Father     Kidney Disease Sister     Cancer Brother         Prostate    No Known Problems Maternal Aunt     No Known Problems Maternal Uncle     No Known Problems Paternal Aunt     No Known Problems Paternal Uncle     No Known Problems Maternal Grandmother     No Known Problems Maternal Grandfather     No Known Problems Paternal Grandmother     No Known Problems Paternal Grandfather     No Known Problems Maternal Cousin     No Known Problems Paternal Cousin     No Known Problems Brother     Anxiety Disorder Daughter     No Known Problems Grandchild        Social History    TOBACCO:   reports that he has been smoking cigarettes. He started smoking about 44 years ago. He has a 40.00 pack-year smoking history. He has never used smokeless tobacco.  ETOH:   reports current alcohol use of about 70.0 standard drinks of alcohol per week. Home Medications  Prior to Admission medications    Medication Sig Start Date End Date Taking? Authorizing Provider   amLODIPine (NORVASC) 10 MG tablet TAKE ONE TABLET BY MOUTH DAILY 6/24/22  Yes Roxi Branch MD   gabapentin (NEURONTIN) 100 MG capsule TAKE ONE CAPSULE BY MOUTH THREE TIMES A DAY 6/24/22 9/22/22 Yes Roxi Branch MD   Multiple Vitamins-Minerals (MULTIVITAMIN GUMMIES MENS) CHEW Take 1 tablet by mouth daily as needed   Yes Historical Provider, MD       Allergies  No Known Allergies    Review of Systems:    + for neuropathy of BL U/LE      Objective  /76   Pulse (!) 102   Temp 97.7 °F (36.5 °C)   Ht 5' 11\" (1.803 m)   Wt 148 lb 6.4 oz (67.3 kg)   SpO2 100%   BMI 20.70 kg/m²     Physical Performance Status    Physical Exam:  General: AAO to person, place, time, and purpose, appears stated age, cooperative, no acute distress, pleasant   Head and neck : PERRLA, EOMI . Sclera non icteric.   Oropharynx : Clear  Neck: no JVD,  no adenopathy  LYMPHATICS : No LAD  Lungs: Clear to auscultation   Heart: Regular rate and regular rhythm, no murmur, normal S1, S2  Abdomen: Soft, non-tender;no masses, no organomegaly  Extremities: No edema,no cyanosis, no clubbing. Skin:  No rash. Neurologic:Cranial nerves grossly intact. No focal motor or sensory deficits . Recent Laboratory Data-   Lab Results   Component Value Date    WBC 5.0 07/12/2022    HGB 13.4 07/12/2022    HCT 38.8 07/12/2022    MCV 96.3 07/12/2022     07/12/2022    LYMPHOPCT 17.5 (L) 07/12/2022    RBC 4.03 07/12/2022    MCH 33.3 07/12/2022    MCHC 34.5 07/12/2022    RDW 13.3 07/12/2022    NEUTOPHILPCT 69.0 07/12/2022    MONOPCT 10.3 07/12/2022    BASOPCT 1.2 07/12/2022    NEUTROABS 3.43 07/12/2022    LYMPHSABS 0.87 (L) 07/12/2022    MONOSABS 0.51 07/12/2022    EOSABS 0.07 07/12/2022    BASOSABS 0.06 07/12/2022       Lab Results   Component Value Date     04/11/2022    K 4.2 04/11/2022    CL 96 (L) 04/11/2022    CO2 24 04/11/2022    BUN 8 04/11/2022    CREATININE 1.0 04/11/2022    GLUCOSE 111 (H) 04/11/2022    CALCIUM 9.7 04/11/2022    PROT 8.0 04/11/2022    LABALBU 4.8 04/11/2022    BILITOT 0.5 04/11/2022    ALKPHOS 74 04/11/2022    AST 32 04/11/2022    ALT 13 04/11/2022    LABGLOM >60 04/11/2022    GFRAA >60 04/11/2022       Lab Results   Component Value Date    IRON 140 01/26/2021    TIBC 355 01/26/2021    FERRITIN 214 01/26/2021           Radiology-    No results found. ASSESSMENT/PLAN :    There are no diagnoses linked to this encounter.    62 yo male  Reportedly history of stage IIIB colon cancer  S/p resection and adj FOLFOX x 12 with some DR at the end  Iatrogenic neuropathy    - Will request records from Dr. Ligia Rivero including pathology (reported 2/31 LNs+)  - He is due for CT A/P and this is ordered today  - Will need referral at next visit for colonoscopy  - CBC/CMP/CEA today  - RTC in 1 months to review results, then q 3 months with port flush q ~6 weeks    1/19/21  - Stage III B colon cancer s/p resection and 12 cycles adj FOLFOX  - CT A/P with no evidence of recurrence of metastatic disease. Diffuse hepatic steatosis was noted  - CBC and CMP was WNL with the exception of elevated LFTs, likely due to the steatosis  - CEA 7.4, will trend  - Refer to Dr. Bia Donohue for colonoscopy surveillance  - PORT flush q 6 weeks  - RTC in 3 months with repeat labs    4/20/21  - Stage III B colon cancer s/p resection and 12 cycles adj FOLFOX  - CT A/P as above  - CBC and CMP was WNL with the exception of elevated LFTs (though improved from prior), likely due to the steatosis  - CEA up from 7.4 to 8.2. Still smoking 1 ppd, will work on quitting   - Referred to Dr. Bia Donohue for colonoscopy surveillance  - PORT flush q 6 weeks  - RTC in 3 months with repeat labs at CT A/P    7/13/21  - Stage III B colon cancer s/p resection and 12 cycles adj FOLFOX (6/20)  - CT A/P negative for recurrence, hepatic steatosis noted  - CBC and CMP was WNL. Elevated LFTs further improved, likely due to the steatosis  - CEA down to 7.0. Still smoking 1 ppd, discussed cessation again  - Dr. Bia Donohue for colonoscopy surveillance  - PORT flush q 6 weeks  - RTC in 3 months    10/12/21  - Stage III B colon cancer s/p resection and 12 cycles adj FOLFOX (6/20)  - CT A/P due prior to follow up, ordered  - CBC and CMP was WNL. Elevated LFTs improved further  - CEA down to 6.6. Still smoking, but down to a pack q 2 days, cessation counseling provided  - Dr. Bia Donohue for colonoscopy surveillance  - PORT flush q 6 weeks. Can have out at 2 years  - RTC in 3 months    1/11/22  - Stage III B colon cancer s/p resection and 12 cycles adj FOLFOX (6/20)  - CT A/P without evidence of recurrence   - CBC WNL. CMP stable  - CEA stable at 6.6. Still smoking ~10 cigs a day. Further discussed cessation and ways to cut down and eventually quit  - Dr. Bia Donohue for colonoscopy surveillance  - PORT flush q 6 weeks.  Can have out at 2 years  - RTC in 3 months    4/12/22  - Stage III B colon cancer s/p resection and 12 cycles adj FOLFOX (6/20)  - CT A/P without evidence of recurrence (10/21). Repeat prior to follow up  - CBC WNL. CMP stable  - CEA pending today, previously 6.6. Smoking < than 10 cigs a day. Further discussed cessation and ways to cut down and eventually quit, he continues to work on this  - Dr. Aisha Gonzalez for colonoscopy surveillance  - PORT flush q 6 weeks. Can have out at 2 years  - RTC in 3 months    7/12/22  - Stage III B colon cancer s/p resection and 12 cycles adj FOLFOX (6/20)  - CT A/P (4/29/22) without evidence of recurrence  - Labs reviewed, stable  - CEA pending today, previously 5.5. Smoking < than 10 cigs a day.  Further discussed cessation and ways to cut down and eventually quit, he continues to work on this  - Dr. Aisha Gonzalez for colonoscopy surveillance  - PORT can come out, refer to Dr. Gilbert Hilario  - RTC in 4 months    Viky Salazar MD   Electronically signed 7/12/2022 at 10:34 AM

## 2022-07-19 ENCOUNTER — OFFICE VISIT (OUTPATIENT)
Dept: SURGERY | Age: 62
End: 2022-07-19
Payer: MEDICARE

## 2022-07-19 ENCOUNTER — TELEPHONE (OUTPATIENT)
Dept: SURGERY | Age: 62
End: 2022-07-19

## 2022-07-19 VITALS
WEIGHT: 148 LBS | SYSTOLIC BLOOD PRESSURE: 141 MMHG | BODY MASS INDEX: 20.72 KG/M2 | HEART RATE: 114 BPM | HEIGHT: 71 IN | TEMPERATURE: 98.4 F | DIASTOLIC BLOOD PRESSURE: 84 MMHG

## 2022-07-19 DIAGNOSIS — Z95.828 PORT-A-CATH IN PLACE: Primary | ICD-10-CM

## 2022-07-19 PROCEDURE — G8427 DOCREV CUR MEDS BY ELIG CLIN: HCPCS | Performed by: SURGERY

## 2022-07-19 PROCEDURE — 4004F PT TOBACCO SCREEN RCVD TLK: CPT | Performed by: SURGERY

## 2022-07-19 PROCEDURE — G8420 CALC BMI NORM PARAMETERS: HCPCS | Performed by: SURGERY

## 2022-07-19 PROCEDURE — 99203 OFFICE O/P NEW LOW 30 MIN: CPT | Performed by: SURGERY

## 2022-07-19 PROCEDURE — 3017F COLORECTAL CA SCREEN DOC REV: CPT | Performed by: SURGERY

## 2022-07-19 NOTE — PROGRESS NOTES
General Surgery History and Physical  [facility]    Patient's Name/Date of Birth: Lesley Bhagat / 1960    Date: July 19, 2022     Surgeon: Qasim Lazaro MD    PCP: Floyd Huynh MD     Chief Complaint: Reported placed    HPI:   Lesley Bhagat is a 60 y.o. male who presents for evaluation of Mediport removal.  He has a history of colon cancer stage IIIb status postresection in November 2019 and adjuvant chemotherapy. This was over 2 years ago and now no longer needs the Mediport. He was recently seen by oncology and was referred for port removal.  He denies any problems with the Mediport since it was placed. Patient Active Problem List   Diagnosis    Colon cancer metastasized to intra-abdominal lymph node ([address]    Neuropathy due to chemotherapeutic drug ([address]    Essential hypertension    Smoker    Abnormal LFTs    Alcohol use       Past Medical History:   Diagnosis Date    Cataract     bilateral    Colon cancer ([address]     Hypertension     Neuropathy     finger/feet from chemotherapy       Past Surgical History:   Procedure Laterality Date    COLON SURGERY  2019    EYE SURGERY Bilateral     MEDIPORT INSERTION, SINGLE Left 2020    Done in [address]  11/21/2019       No Known Allergies    The patient has a family history that is negative for severe cardiovascular or respiratory issues, negative for reaction to anesthesia. Time spent reviewing past medical, surgical, social and family history, vitals, nursing assessment and images. No changes from above documented history.     Social History     Socioeconomic History    Marital status:      Spouse name: Not on file    Number of children: Not on file    Years of education: Not on file    Highest education level: Not on file   Occupational History    Not on file   Tobacco Use    Smoking status: Every Day     Packs/day: 1.00     Years: 40.00     Pack years: 40.00     Types: Cigarettes     Start date: 1978    Smokeless tobacco: Never    Tobacco comments:     Informed of [facility] Tobacco Cessation program, gave pamphlet. Vaping Use    Vaping Use: Some days    Substances: Nicotine    Devices: Pre-filled or refillable cartridge   Substance and Sexual Activity    Alcohol use: Yes     Alcohol/week: 70.0 standard drinks     Types: 70 Cans of beer per week    Drug use: Never    Sexual activity: Not Currently   Other Topics Concern    Not on file   Social History Narrative    Not on file     Social Determinants of Health     Financial Resource Strain: Low Risk     Difficulty of Paying Living Expenses: Not hard at all   Food Insecurity: No Food Insecurity    Worried About Running Out of Food in the Last Year: Never true    Ran Out of Food in the Last Year: Never true   Transportation Needs: Not on file   Physical Activity: Not on file   Stress: Not on file   Social Connections: Not on file   Intimate Partner Violence: Not on file   Housing Stability: Not on file       I have reviewed relevant labs from this admission and interpretation is included in my assessment and plan    Review of Systems    A complete 10 system review was performed and are otherwise negative unless mentioned in the above HPI. Specific negatives are listed below but may not include all those reviewed.     General ROS: negative obtundation, AMS  ENT ROS: negative rhinorrhea, epistaxis  Allergy and Immunology ROS: negative itchy/watery eyes or nasal congestion  Hematological and Lymphatic ROS: negative spontaneous bleeding or bruising  Endocrine ROS: negative  lethargy, mood swings, palpitations or polydipsia/polyuria  Respiratory ROS: negative sputum changes, stridor, tachypnea or wheezing  Cardiovascular ROS: negative for - loss of consciousness, murmur or orthopnea  Gastrointestinal ROS: negative for - hematochezia or hematemesis  Genito-Urinary ROS: negative for -  genital discharge or hematuria  Musculoskeletal ROS: negative for - focal weakness, gangrene  Psych/Neuro ROS: negative for - visual or auditory hallucinations, suicidal ideation    Physical exam:   BP (!) 141/84   Pulse (!) 114   Temp 98.4 °F (36.9 °C) (Temporal)   Ht 5' 11\" (1.803 m)   Wt 148 lb (67.1 kg)   BMI 20.64 kg/m²   General appearance:  NAD, appears stated age  Head: NCAT, PERRLA, EOMI, red conjunctiva  Neck: supple, no masses, trachea midline  Lungs: Equal chest rise bilateral, no retractions, no wheezing  Chest: L chest wall port in place  Heart: Reg rate  Abdomen: soft, nondistended  Skin; warm and dry, no cyanosis  Gu: no cva tenderness  Extremities: atraumatic, no focal motor deficits, no open wounds  Psych: No tremor, visual hallucinations      Radiology: n/a    Assessment:  Serene Huff is a 60 y.o. male with Mediport in place  Patient Active Problem List   Diagnosis    Colon cancer metastasized to intra-abdominal lymph node (HCC)    Neuropathy due to chemotherapeutic drug ([facility]    Essential hypertension    Smoker    Abnormal LFTs    Alcohol use         Plan:  Proceed to the OR for Mediport removal  -The procedure, risks, benefits and alternatives were discussed with patient. he   agrees to proceed.         Maribel Yi MD  8:45 AM

## 2022-07-19 NOTE — TELEPHONE ENCOUNTER
Per the order of Dr. Miguel A Bruce, patient has been scheduled for Mediport removal on 8.11.2022. Patient provided with procedure information during office visit. Patient instructed to please contact our office with any questions. Procedure scheduled through [employer]. Dr. Miguel A Bruce to enter orders.     Electronically signed by Leslye Wheat on 7/19/22 at 9:31 AM EDT

## 2022-08-04 ENCOUNTER — HOSPITAL ENCOUNTER (OUTPATIENT)
Dept: PREADMISSION TESTING | Age: 62
Discharge: HOME | End: 2022-08-04
Payer: MEDICARE

## 2022-08-04 VITALS
SYSTOLIC BLOOD PRESSURE: 140 MMHG | WEIGHT: 142.31 LBS | RESPIRATION RATE: 20 BRPM | BODY MASS INDEX: 19.85 KG/M2 | OXYGEN SATURATION: 99 % | DIASTOLIC BLOOD PRESSURE: 66 MMHG | HEART RATE: 117 BPM | TEMPERATURE: 98.2 F

## 2022-08-04 DIAGNOSIS — Z01.818 PRE-OP TESTING: Primary | ICD-10-CM

## 2022-08-04 LAB
ANION GAP SERPL CALCULATED.3IONS-SCNC: 15 MMOL/L (ref 7–16)
BASOPHILS ABSOLUTE: 0.06 E9/L (ref 0–0.2)
BASOPHILS RELATIVE PERCENT: 1.4 % (ref 0–2)
BUN BLDV-MCNC: 6 MG/DL (ref 6–23)
CALCIUM SERPL-MCNC: 9.8 MG/DL (ref 8.6–10.2)
CHLORIDE BLD-SCNC: 97 MMOL/L (ref 98–107)
CO2: 25 MMOL/L (ref 22–29)
CREAT SERPL-MCNC: 0.9 MG/DL (ref 0.7–1.2)
EKG ATRIAL RATE: 83 BPM
EKG P AXIS: 77 DEGREES
EKG P-R INTERVAL: 184 MS
EKG Q-T INTERVAL: 374 MS
EKG QRS DURATION: 88 MS
EKG QTC CALCULATION (BAZETT): 439 MS
EKG R AXIS: 65 DEGREES
EKG T AXIS: 70 DEGREES
EKG VENTRICULAR RATE: 83 BPM
EOSINOPHILS ABSOLUTE: 0.08 E9/L (ref 0.05–0.5)
EOSINOPHILS RELATIVE PERCENT: 1.9 % (ref 0–6)
GFR AFRICAN AMERICAN: >60
GFR NON-AFRICAN AMERICAN: >60 ML/MIN/1.73
GLUCOSE BLD-MCNC: 82 MG/DL (ref 74–99)
HCT VFR BLD CALC: 40.8 % (ref 37–54)
HEMOGLOBIN: 13.9 G/DL (ref 12.5–16.5)
IMMATURE GRANULOCYTES #: 0.03 E9/L
IMMATURE GRANULOCYTES %: 0.7 % (ref 0–5)
LYMPHOCYTES ABSOLUTE: 0.87 E9/L (ref 1.5–4)
LYMPHOCYTES RELATIVE PERCENT: 20.8 % (ref 20–42)
MCH RBC QN AUTO: 33 PG (ref 26–35)
MCHC RBC AUTO-ENTMCNC: 34.1 % (ref 32–34.5)
MCV RBC AUTO: 96.9 FL (ref 80–99.9)
MONOCYTES ABSOLUTE: 0.54 E9/L (ref 0.1–0.95)
MONOCYTES RELATIVE PERCENT: 12.9 % (ref 2–12)
NEUTROPHILS ABSOLUTE: 2.61 E9/L (ref 1.8–7.3)
NEUTROPHILS RELATIVE PERCENT: 62.3 % (ref 43–80)
PDW BLD-RTO: 13.6 FL (ref 11.5–15)
PLATELET # BLD: 182 E9/L (ref 130–450)
PMV BLD AUTO: 8.7 FL (ref 7–12)
POTASSIUM REFLEX MAGNESIUM: 4.2 MMOL/L (ref 3.5–5)
RBC # BLD: 4.21 E12/L (ref 3.8–5.8)
SODIUM BLD-SCNC: 137 MMOL/L (ref 132–146)
WBC # BLD: 4.1 E9/L (ref 4.5–11.5)

## 2022-08-04 PROCEDURE — 85025 COMPLETE CBC W/AUTO DIFF WBC: CPT

## 2022-08-04 PROCEDURE — 80048 BASIC METABOLIC PNL TOTAL CA: CPT

## 2022-08-04 PROCEDURE — 93005 ELECTROCARDIOGRAM TRACING: CPT | Performed by: ANESTHESIOLOGY

## 2022-08-04 PROCEDURE — 36415 COLL VENOUS BLD VENIPUNCTURE: CPT

## 2022-08-04 RX ORDER — SODIUM CHLORIDE, SODIUM LACTATE, POTASSIUM CHLORIDE, CALCIUM CHLORIDE 600; 310; 30; 20 MG/100ML; MG/100ML; MG/100ML; MG/100ML
INJECTION, SOLUTION INTRAVENOUS CONTINUOUS
Status: CANCELLED | OUTPATIENT
Start: 2022-08-11

## 2022-08-04 NOTE — PROGRESS NOTES
[address] [facility]                                                                                                                    PRE OP INSTRUCTIONS FOR  Tata Davila        Date: 8/4/2022    Date of surgery: 8/11/22   Arrival Time: Hospital will call you between 5pm and 7pm the Wednesday evening with your final arrival time for surgery    Do not eat or drink anything after midnight prior to surgery. This includes no water, chewing gum, mints or ice chips. Take the following medications with a small sip of water on the morning of Surgery: gabapentin and amlodipine     Diabetics may take evening dose of insulin but none after midnight. If you feel symptomatic or low blood sugar morning of surgery drink 1-2 ounces of apple juice only. Aspirin, Ibuprofen, Advil, Naproxen, Vitamin E and other Anti-inflammatory products should be stopped  before surgery  as directed by your physician. Take Tylenol only unless instructed otherwise by your surgeon. Check with your Doctor regarding stopping Plavix, Coumadin, Lovenox, Eliquis, Effient, or other blood thinners. Do not smoke,use illicit drugs and do not drink any alcoholic beverages 24 hours prior to surgery. You may brush your teeth the morning of surgery. DO NOT SWALLOW WATER    You MUST make arrangements for a responsible adult to take you home after your surgery. You will not be allowed to leave alone or drive yourself home. It is strongly suggested someone stay with you the first 24 hrs. Your surgery will be cancelled if you do not have a ride home. PEDIATRIC PATIENTS ONLY:  A parent/legal guardian must accompany a child scheduled for surgery and plan to stay at the hospital until the child is discharged. Please do not bring other children with you.     Please wear simple, loose fitting clothing to the hospital.  Jennifer Leyva not bring valuables (money, credit cards, checkbooks, etc.) Do not wear any makeup (including no eye makeup) or nail polish on your fingers or toes. DO NOT wear any jewelry or piercings on day of surgery. All body piercing jewelry must be removed. Shower the night before surgery with _x__Antibacterial soap /TATIANNA WIPES________    TOTAL JOINT REPLACEMENT/HYSTERECTOMY PATIENTS ONLY---Remember to bring Blood Bank bracelet to the hospital on the day of surgery. If you have a Living Will and Durable Power of  for Healthcare, please bring in a copy. If appropriate bring crutches, inspirex, WALKER, CANE etc... Notify your Surgeon if you develop any illness between now and surgery time, cough, cold, fever, sore throat, nausea, vomiting, etc.  Please notify your surgeon if you experience dizziness, shortness of breath or blurred vision between now & the time of your surgery. If you have ___dentures, they will be removed before going to the OR; we will provide you a container. If you wear ___contact lenses or _x__glasses, they will be removed; please bring a case for them. To provide excellent care visitors will be limited to 2 in the room at any given time. Please bring picture ID and insurance card. Sleep apnea patients need to bring CPAP SETTINGS to hospital on day of surgery. During flu season no children under the age of 15 are permitted in the hospital for the safety of all patients. Other                  Please call AMBULATORY CARE if you have any further questions.    [address] [facility]     [address]

## 2022-08-09 ENCOUNTER — OFFICE VISIT (OUTPATIENT)
Dept: FAMILY MEDICINE CLINIC | Age: 62
End: 2022-08-09
Payer: MEDICARE

## 2022-08-09 VITALS
BODY MASS INDEX: 20.19 KG/M2 | WEIGHT: 144.2 LBS | HEIGHT: 71 IN | HEART RATE: 106 BPM | SYSTOLIC BLOOD PRESSURE: 138 MMHG | TEMPERATURE: 98.6 F | DIASTOLIC BLOOD PRESSURE: 84 MMHG | OXYGEN SATURATION: 98 %

## 2022-08-09 DIAGNOSIS — R73.01 IFG (IMPAIRED FASTING GLUCOSE): ICD-10-CM

## 2022-08-09 DIAGNOSIS — E78.5 HYPERLIPIDEMIA, UNSPECIFIED HYPERLIPIDEMIA TYPE: ICD-10-CM

## 2022-08-09 DIAGNOSIS — R79.89 ABNORMAL LFTS: ICD-10-CM

## 2022-08-09 DIAGNOSIS — Z78.9 ALCOHOL USE: ICD-10-CM

## 2022-08-09 DIAGNOSIS — F17.200 SMOKER: ICD-10-CM

## 2022-08-09 DIAGNOSIS — I10 ESSENTIAL HYPERTENSION: ICD-10-CM

## 2022-08-09 DIAGNOSIS — C77.2 COLON CANCER METASTASIZED TO INTRA-ABDOMINAL LYMPH NODE (HCC): ICD-10-CM

## 2022-08-09 DIAGNOSIS — C18.9 COLON CANCER METASTASIZED TO INTRA-ABDOMINAL LYMPH NODE (HCC): ICD-10-CM

## 2022-08-09 DIAGNOSIS — G62.0 NEUROPATHY DUE TO CHEMOTHERAPEUTIC DRUG (HCC): ICD-10-CM

## 2022-08-09 DIAGNOSIS — T45.1X5A NEUROPATHY DUE TO CHEMOTHERAPEUTIC DRUG (HCC): ICD-10-CM

## 2022-08-09 DIAGNOSIS — Z76.89 ESTABLISHING CARE WITH NEW DOCTOR, ENCOUNTER FOR: Primary | ICD-10-CM

## 2022-08-09 PROCEDURE — G8427 DOCREV CUR MEDS BY ELIG CLIN: HCPCS | Performed by: INTERNAL MEDICINE

## 2022-08-09 PROCEDURE — 99214 OFFICE O/P EST MOD 30 MIN: CPT | Performed by: INTERNAL MEDICINE

## 2022-08-09 PROCEDURE — G8420 CALC BMI NORM PARAMETERS: HCPCS | Performed by: INTERNAL MEDICINE

## 2022-08-09 PROCEDURE — 4004F PT TOBACCO SCREEN RCVD TLK: CPT | Performed by: INTERNAL MEDICINE

## 2022-08-09 PROCEDURE — 3017F COLORECTAL CA SCREEN DOC REV: CPT | Performed by: INTERNAL MEDICINE

## 2022-08-09 RX ORDER — GABAPENTIN 100 MG/1
100 CAPSULE ORAL 2 TIMES DAILY
Qty: 270 CAPSULE | Refills: 0 | Status: CANCELLED | OUTPATIENT
Start: 2022-08-09 | End: 2022-11-07

## 2022-08-09 RX ORDER — AMLODIPINE BESYLATE 10 MG/1
10 TABLET ORAL DAILY
Qty: 90 TABLET | Refills: 1 | Status: SHIPPED | OUTPATIENT
Start: 2022-08-09 | End: 2022-11-07

## 2022-08-09 ASSESSMENT — ENCOUNTER SYMPTOMS
BLOOD IN STOOL: 0
EYE DISCHARGE: 0
SHORTNESS OF BREATH: 0
ABDOMINAL PAIN: 0
NAUSEA: 0

## 2022-08-09 NOTE — ASSESSMENT & PLAN NOTE
Adv for smoking cessation  Pt says he is working on smoking cessation himself and decrease to 1/2 ppd

## 2022-08-09 NOTE — PROGRESS NOTES
Chief Complaint   Patient presents with    Establish Care     Previous PCP Dr. Marti Ordonez, Specialist: Dr. Rowena Alcaraz (Hx of Colon CA), Dr. Elena Jack (GI)           Health Maintenance     AWV due, Pneumonia Vaccine, Shingles vaccine, DTap    Other     Having Jessica Lafleur removed on 08/11/2022       Medication Refill       HPI:  Patient is here as new pt for me    Pt has CA colon- surgery 11/2019 - [place]  Stage 3b per Onco report  Had chemo while in [place]  Developed Chemo related neuropathy  On MORGAN,   Here following with Dr Rodrigo Leyva  Had surveillance Colonoscopy 1/28/21- Dr Madi Leiva- ok    Has Elevated LFTs, work up by Dr Madi Leiva    Has removal of port sherlyn this week     Over all feeling ok    Trying to decrease Smoking- now 1/2 ppd    Drinks 3-4 /beer a day few times a week       Allergy and Medications are reviewed and updated. Past Medical History, Surgical History, and Family History has been reviewed and updated. Review of Systems:  Review of Systems   Constitutional:  Negative for chills and fever. HENT:  Negative for congestion and ear pain. Eyes:  Negative for discharge. Respiratory:  Negative for shortness of breath (No new SOB). Cardiovascular:  Negative for chest pain and leg swelling. Gastrointestinal:  Negative for abdominal pain, blood in stool and nausea. Endocrine: Negative for polydipsia. Genitourinary:  Negative for flank pain and hematuria. Musculoskeletal:  Negative for myalgias and neck pain. Skin:  Negative for rash. Neurological:  Negative for dizziness and seizures. Hematological:  Does not bruise/bleed easily. Psychiatric/Behavioral:  Negative for hallucinations and suicidal ideas. The patient is nervous/anxious. Vitals:    08/09/22 0914   BP: 138/84   Position: Sitting   Pulse: (!) 106   Temp: 98.6 °F (37 °C)   TempSrc: Temporal   SpO2: 98%   Weight: 144 lb 3.2 oz (65.4 kg)   Height: 5' 11\" (1.803 m)       Physical Exam  Vitals reviewed.    Constitutional: Appearance: He is well-developed. HENT:      Head: Normocephalic and atraumatic. Eyes:      Conjunctiva/sclera: Conjunctivae normal.      Pupils: Pupils are equal, round, and reactive to light. Neck:      Vascular: No JVD. Cardiovascular:      Rate and Rhythm: Normal rate and regular rhythm. Pulmonary:      Effort: Pulmonary effort is normal.      Breath sounds: Normal breath sounds. Abdominal:      General: Bowel sounds are normal.      Palpations: Abdomen is soft. Musculoskeletal:         General: Normal range of motion. Skin:     General: Skin is warm and dry. Neurological:      Mental Status: He is alert and oriented to person, place, and time. Psychiatric:         Behavior: Behavior normal.        Labs :    Lab Results   Component Value Date    WBC 4.1 (L) 08/04/2022    HGB 13.9 08/04/2022    HCT 40.8 08/04/2022     08/04/2022    CHOL 286 (H) 12/17/2020    TRIG 49 12/17/2020     12/17/2020    ALT 52 (H) 07/12/2022     (H) 07/12/2022     08/04/2022    K 4.2 08/04/2022    CL 97 (L) 08/04/2022    CREATININE 0.9 08/04/2022    BUN 6 08/04/2022    CO2 25 08/04/2022     Lab Results   Component Value Date/Time    COLORU Yellow 12/17/2020 11:07 AM    NITRU Negative 12/17/2020 11:07 AM    GLUCOSEU Negative 12/17/2020 11:07 AM    KETUA Negative 12/17/2020 11:07 AM    UROBILINOGEN 0.2 12/17/2020 11:07 AM    BILIRUBINUR Negative 12/17/2020 11:07 AM     Lab Results   Component Value Date/Time    CEA 7.5 07/12/2022 10:19 AM             ASSESSMENT     Patient Active Problem List    Diagnosis Date Noted    Abnormal LFTs 03/25/2021    Alcohol use 03/25/2021    Colon cancer metastasized to intra-abdominal lymph node ([address] 12/17/2020     Overview Note:     6 cm adenoca transverse colon causing obstruction.  T4a, N1a, M0, st IIIB  Extended right and transverse colectomy with ileocolic anastomosis 67/50/5466         Assessment & Plan Note:      Monitored by specialist- no acute findings Low salt, Low Carb diet an low fat diet  Continue medications as advised and take them regularly  Regular exercises as advised    Discussed natural and expected course of this diagnosis and need to alert me if symptoms do not follow expected course, or if any worse. Smoking cessation if applicable, discussed with patient. He is taking MORGAN 100 mg bid and Norvasc 10 mg daily           There are no Patient Instructions on file for this visit.     Return in about 2 months (around 10/9/2022) for Annual Medicare Wellness Exam.

## 2022-08-11 ENCOUNTER — ANESTHESIA (OUTPATIENT)
Dept: OPERATING ROOM | Age: 62
End: 2022-08-11
Payer: MEDICARE

## 2022-08-11 ENCOUNTER — ANESTHESIA EVENT (OUTPATIENT)
Dept: OPERATING ROOM | Age: 62
End: 2022-08-11
Payer: MEDICARE

## 2022-08-11 ENCOUNTER — HOSPITAL ENCOUNTER (OUTPATIENT)
Age: 62
Setting detail: OUTPATIENT SURGERY
Discharge: HOME | End: 2022-08-11
Attending: SURGERY | Admitting: SURGERY
Payer: MEDICARE

## 2022-08-11 VITALS
HEIGHT: 71 IN | DIASTOLIC BLOOD PRESSURE: 83 MMHG | OXYGEN SATURATION: 96 % | SYSTOLIC BLOOD PRESSURE: 142 MMHG | HEART RATE: 79 BPM | BODY MASS INDEX: 19.88 KG/M2 | TEMPERATURE: 97.5 F | RESPIRATION RATE: 18 BRPM | WEIGHT: 142 LBS

## 2022-08-11 PROCEDURE — 3700000000 HC ANESTHESIA ATTENDED CARE: Performed by: SURGERY

## 2022-08-11 PROCEDURE — 2500000003 HC RX 250 WO HCPCS: Performed by: SURGERY

## 2022-08-11 PROCEDURE — 2580000003 HC RX 258: Performed by: ANESTHESIOLOGY

## 2022-08-11 PROCEDURE — 7100000011 HC PHASE II RECOVERY - ADDTL 15 MIN: Performed by: SURGERY

## 2022-08-11 PROCEDURE — 3600000012 HC SURGERY LEVEL 2 ADDTL 15MIN: Performed by: SURGERY

## 2022-08-11 PROCEDURE — 36590 REMOVAL TUNNELED CV CATH: CPT | Performed by: SURGERY

## 2022-08-11 PROCEDURE — 3700000001 HC ADD 15 MINUTES (ANESTHESIA): Performed by: SURGERY

## 2022-08-11 PROCEDURE — 2709999900 HC NON-CHARGEABLE SUPPLY: Performed by: SURGERY

## 2022-08-11 PROCEDURE — 7100000010 HC PHASE II RECOVERY - FIRST 15 MIN: Performed by: SURGERY

## 2022-08-11 PROCEDURE — 2580000003 HC RX 258: Performed by: NURSE ANESTHETIST, CERTIFIED REGISTERED

## 2022-08-11 PROCEDURE — 6360000002 HC RX W HCPCS: Performed by: NURSE ANESTHETIST, CERTIFIED REGISTERED

## 2022-08-11 PROCEDURE — 3600000002 HC SURGERY LEVEL 2 BASE: Performed by: SURGERY

## 2022-08-11 RX ORDER — SODIUM CHLORIDE 0.9 % (FLUSH) 0.9 %
5-40 SYRINGE (ML) INJECTION PRN
Status: DISCONTINUED | OUTPATIENT
Start: 2022-08-11 | End: 2022-08-11 | Stop reason: HOSPADM

## 2022-08-11 RX ORDER — FENTANYL CITRATE 50 UG/ML
INJECTION, SOLUTION INTRAMUSCULAR; INTRAVENOUS PRN
Status: DISCONTINUED | OUTPATIENT
Start: 2022-08-11 | End: 2022-08-11 | Stop reason: SDUPTHER

## 2022-08-11 RX ORDER — LIDOCAINE HYDROCHLORIDE 10 MG/ML
INJECTION, SOLUTION INFILTRATION; PERINEURAL PRN
Status: DISCONTINUED | OUTPATIENT
Start: 2022-08-11 | End: 2022-08-11 | Stop reason: ALTCHOICE

## 2022-08-11 RX ORDER — BUPIVACAINE HYDROCHLORIDE 2.5 MG/ML
INJECTION, SOLUTION EPIDURAL; INFILTRATION; INTRACAUDAL PRN
Status: DISCONTINUED | OUTPATIENT
Start: 2022-08-11 | End: 2022-08-11 | Stop reason: ALTCHOICE

## 2022-08-11 RX ORDER — MIDAZOLAM HYDROCHLORIDE 1 MG/ML
INJECTION INTRAMUSCULAR; INTRAVENOUS PRN
Status: DISCONTINUED | OUTPATIENT
Start: 2022-08-11 | End: 2022-08-11 | Stop reason: SDUPTHER

## 2022-08-11 RX ORDER — PROPOFOL 10 MG/ML
INJECTION, EMULSION INTRAVENOUS CONTINUOUS PRN
Status: DISCONTINUED | OUTPATIENT
Start: 2022-08-11 | End: 2022-08-11 | Stop reason: SDUPTHER

## 2022-08-11 RX ORDER — SODIUM CHLORIDE 9 MG/ML
INJECTION, SOLUTION INTRAVENOUS PRN
Status: DISCONTINUED | OUTPATIENT
Start: 2022-08-11 | End: 2022-08-11 | Stop reason: HOSPADM

## 2022-08-11 RX ORDER — SODIUM CHLORIDE, SODIUM LACTATE, POTASSIUM CHLORIDE, CALCIUM CHLORIDE 600; 310; 30; 20 MG/100ML; MG/100ML; MG/100ML; MG/100ML
INJECTION, SOLUTION INTRAVENOUS CONTINUOUS PRN
Status: DISCONTINUED | OUTPATIENT
Start: 2022-08-11 | End: 2022-08-11 | Stop reason: SDUPTHER

## 2022-08-11 RX ORDER — SODIUM CHLORIDE, SODIUM LACTATE, POTASSIUM CHLORIDE, CALCIUM CHLORIDE 600; 310; 30; 20 MG/100ML; MG/100ML; MG/100ML; MG/100ML
INJECTION, SOLUTION INTRAVENOUS CONTINUOUS
Status: DISCONTINUED | OUTPATIENT
Start: 2022-08-11 | End: 2022-08-11 | Stop reason: HOSPADM

## 2022-08-11 RX ORDER — SODIUM CHLORIDE 0.9 % (FLUSH) 0.9 %
5-40 SYRINGE (ML) INJECTION EVERY 12 HOURS SCHEDULED
Status: DISCONTINUED | OUTPATIENT
Start: 2022-08-11 | End: 2022-08-11 | Stop reason: HOSPADM

## 2022-08-11 RX ADMIN — SODIUM CHLORIDE, POTASSIUM CHLORIDE, SODIUM LACTATE AND CALCIUM CHLORIDE: 600; 310; 30; 20 INJECTION, SOLUTION INTRAVENOUS at 08:30

## 2022-08-11 RX ADMIN — MIDAZOLAM 2 MG: 1 INJECTION INTRAMUSCULAR; INTRAVENOUS at 08:30

## 2022-08-11 RX ADMIN — FENTANYL CITRATE 50 MCG: 50 INJECTION, SOLUTION INTRAMUSCULAR; INTRAVENOUS at 08:42

## 2022-08-11 RX ADMIN — PROPOFOL 75 MCG/KG/MIN: 10 INJECTION, EMULSION INTRAVENOUS at 08:34

## 2022-08-11 RX ADMIN — FENTANYL CITRATE 50 MCG: 50 INJECTION, SOLUTION INTRAMUSCULAR; INTRAVENOUS at 08:34

## 2022-08-11 RX ADMIN — SODIUM CHLORIDE, POTASSIUM CHLORIDE, SODIUM LACTATE AND CALCIUM CHLORIDE: 600; 310; 30; 20 INJECTION, SOLUTION INTRAVENOUS at 07:43

## 2022-08-11 ASSESSMENT — PAIN - FUNCTIONAL ASSESSMENT: PAIN_FUNCTIONAL_ASSESSMENT: NONE - DENIES PAIN

## 2022-08-11 ASSESSMENT — LIFESTYLE VARIABLES: SMOKING_STATUS: 1

## 2022-08-11 NOTE — H&P
General Surgery History and Physical  [facility]    Patient's Name/Date of Birth: Mandi Chavez / 1960    Date: August 11, 2022     Surgeon: Sylvain Olivares MD    PCP: Inga Gallegos MD     Chief Complaint: Reported placed    HPI:   Mandi Chavez is a 60 y.o. male who presents for evaluation of Mediport removal.  He has a history of colon cancer stage IIIb status postresection in November 2019 and adjuvant chemotherapy. This was over 2 years ago and now no longer needs the Mediport. He was recently seen by oncology and was referred for port removal.  He denies any problems with the Mediport since it was placed. Patient Active Problem List   Diagnosis    Colon cancer metastasized to intra-abdominal lymph node ([facility]    Neuropathy due to chemotherapeutic drug [facility])    Essential hypertension    Smoker    Abnormal LFTs    Alcohol use       Past Medical History:   Diagnosis Date    Cataract     bilateral    Colon cancer ([facility]     Hypertension     Neuropathy     finger/feet from chemotherapy       Past Surgical History:   Procedure Laterality Date    COLON SURGERY  2019    COLONOSCOPY      EYE SURGERY Bilateral     MEDIPORT INSERTION, SINGLE Left 2020    Done in [address]  11/21/2019       No Known Allergies    The patient has a family history that is negative for severe cardiovascular or respiratory issues, negative for reaction to anesthesia. Time spent reviewing past medical, surgical, social and family history, vitals, nursing assessment and images. No changes from above documented history.     Social History     Socioeconomic History    Marital status:      Spouse name: Not on file    Number of children: Not on file    Years of education: Not on file    Highest education level: Not on file   Occupational History    Not on file   Tobacco Use    Smoking status: Every Day     Packs/day: 0.50     Years: 40.00     Pack years: 20.00 Types: Cigarettes     Start date: 1978    Smokeless tobacco: Never    Tobacco comments:     Informed of [facility] Tobacco Cessation program, gave pamphlet. Vaping Use    Vaping Use: Former    Substances: Nicotine    Devices: Pre-filled or refillable cartridge   Substance and Sexual Activity    Alcohol use: Yes     Alcohol/week: 70.0 standard drinks     Types: 70 Cans of beer per week     Comment: few beers every evening    Drug use: Never    Sexual activity: Not Currently   Other Topics Concern    Not on file   Social History Narrative    Not on file     Social Determinants of Health     Financial Resource Strain: Low Risk     Difficulty of Paying Living Expenses: Not hard at all   Food Insecurity: No Food Insecurity    Worried About Running Out of Food in the Last Year: Never true    Ran Out of Food in the Last Year: Never true   Transportation Needs: Not on file   Physical Activity: Not on file   Stress: Not on file   Social Connections: Not on file   Intimate Partner Violence: Not on file   Housing Stability: Not on file       I have reviewed relevant labs from this admission and interpretation is included in my assessment and plan    Review of Systems    A complete 10 system review was performed and are otherwise negative unless mentioned in the above HPI. Specific negatives are listed below but may not include all those reviewed.     General ROS: negative obtundation, AMS  ENT ROS: negative rhinorrhea, epistaxis  Allergy and Immunology ROS: negative itchy/watery eyes or nasal congestion  Hematological and Lymphatic ROS: negative spontaneous bleeding or bruising  Endocrine ROS: negative  lethargy, mood swings, palpitations or polydipsia/polyuria  Respiratory ROS: negative sputum changes, stridor, tachypnea or wheezing  Cardiovascular ROS: negative for - loss of consciousness, murmur or orthopnea  Gastrointestinal ROS: negative for - hematochezia or hematemesis  Genito-Urinary ROS: negative for -  genital discharge or hematuria  Musculoskeletal ROS: negative for - focal weakness, gangrene  Psych/Neuro ROS: negative for - visual or auditory hallucinations, suicidal ideation    Physical exam:   There were no vitals taken for this visit. General appearance:  NAD, appears stated age  Head: NCAT, PERRLA, EOMI, red conjunctiva  Neck: supple, no masses, trachea midline  Lungs: Equal chest rise bilateral, no retractions, no wheezing  Chest: L chest wall port in place  Heart: Reg rate  Abdomen: soft, nondistended  Skin; warm and dry, no cyanosis  Gu: no cva tenderness  Extremities: atraumatic, no focal motor deficits, no open wounds  Psych: No tremor, visual hallucinations      Radiology: n/a    Assessment:  Anastasiya Umanzor is a 60 y.o. male with Mediport in place  Patient Active Problem List   Diagnosis    Colon cancer metastasized to intra-abdominal lymph node (HCC)    Neuropathy due to chemotherapeutic drug ([facility]    Essential hypertension    Smoker    Abnormal LFTs    Alcohol use         Plan:  Proceed to the OR for Mediport removal  -The procedure, risks, benefits and alternatives were discussed with patient. he   agrees to proceed.         Wilfredo Keys MD

## 2022-08-11 NOTE — ANESTHESIA POSTPROCEDURE EVALUATION
Department of Anesthesiology  Postprocedure Note    Patient: Leveda Snellen  MRN: [id number]  YOB: 1960  Date of evaluation: 8/11/2022      Procedure Summary     Date: 08/11/22 Room / Location: [address] [place] [address] / [address]    Anesthesia Start: 1723 Anesthesia Stop: 9734    Procedure: MEDIPORT CATHETER REMOVAL (Left: Chest) Diagnosis:       Port-A-Cath in place      (Port-A-Cath in place [Z95.828])    Surgeons: Deb Nazario MD Responsible Provider: Rubio Turner MD    Anesthesia Type: MAC ASA Status: 3          Anesthesia Type: No value filed.     Carly Phase I: Carly Score: 10    Carly Phase II: Carly Score: 10      Anesthesia Post Evaluation    Patient location during evaluation: PACU  Patient participation: complete - patient participated  Level of consciousness: awake  Airway patency: patent  Nausea & Vomiting: no nausea and no vomiting  Complications: no  Cardiovascular status: hemodynamically stable  Respiratory status: acceptable  Hydration status: stable

## 2022-08-11 NOTE — ANESTHESIA PRE PROCEDURE
Department of Anesthesiology  Preprocedure Note       Name:  Ryan Wallis   Age:  60 y.o.  :  1960                                          MRN:  [id number]         Date:  2022      Surgeon: Vishnu Mix):  Greg Preciado MD    Procedure: Procedure(s): MEDIPORT CATHETER REMOVAL    Medications prior to admission:   Prior to Admission medications    Medication Sig Start Date End Date Taking? Authorizing Provider   amLODIPine (NORVASC) 10 MG tablet Take 1 tablet by mouth in the morning. 22  Zohreh Bourgeois MD   gabapentin (NEURONTIN) 100 MG capsule TAKE ONE CAPSULE BY MOUTH THREE TIMES A DAY 22  Gladis Park MD   Multiple Vitamins-Minerals (MULTIVITAMIN GUMMIES MENS) CHEW Take 1 tablet by mouth daily as needed    Historical Provider, MD       Current medications:    No current facility-administered medications for this encounter. Current Outpatient Medications   Medication Sig Dispense Refill    amLODIPine (NORVASC) 10 MG tablet Take 1 tablet by mouth in the morning.  90 tablet 1    gabapentin (NEURONTIN) 100 MG capsule TAKE ONE CAPSULE BY MOUTH THREE TIMES A  capsule 0    Multiple Vitamins-Minerals (MULTIVITAMIN GUMMIES MENS) CHEW Take 1 tablet by mouth daily as needed         Allergies:  No Known Allergies    Problem List:    Patient Active Problem List   Diagnosis Code    Colon cancer metastasized to intra-abdominal lymph node (HCC) C18.9, C77.2    Neuropathy due to chemotherapeutic drug ([address] G62.0, T45.1X5A    Essential hypertension I10    Smoker F17.200    Abnormal LFTs R94.5    Alcohol use Z72.89       Past Medical History:        Diagnosis Date    Cataract     bilateral    Colon cancer ([address]     Hypertension     Neuropathy     finger/feet from chemotherapy       Past Surgical History:        Procedure Laterality Date    COLON SURGERY      COLONOSCOPY      EYE SURGERY Bilateral     MEDIPORT INSERTION, SINGLE Left 2020    Done in

## 2022-08-11 NOTE — OP NOTE
Operative Note    SURGEON: Mariana Nunez MD    ASSISTANT: ANTHONY Ryan    PREOPERATIVE DIAGNOSIS: Port in place    POSTOPERATIVE DIAGNOSIS: Same    OPERATION: Removal of left subclavian mediport. ANESTHESIA: MAC    ESTIMATED BLOOD LOSS: Minimal    COMPLICATIONS: None    DESCRIPTION OF PROCEDURE: The patient was identified and the procedure was confirmed. The left chest,was prepped and draped in the usual sterile fashion. Next, 1% lidocaine mixed with 0.25% Marcaine was used for local anesthesia. A linear incision was made along the old scar. Sharp dissection with electrocautery. The catheter was identified and isolated. It was then freed from the surround structures and pulled out of the vein in its entirety. The track was then closed with figure of eight vicryl suture. The port reservoir was then freed from surrounding adhesions and was removed easily. The pocket was then cauterized and hemostasis was achieved. 3-0 vicryl was sutures was used in rajan's fascia to re-approximate the skin. Needle, sponge, and instrument counts were reported as correct x2. The patient tolerated the procedure and was transferred to the recovery area in satisfactory condition.         Mariaan Nunez MD

## 2022-11-09 ENCOUNTER — OFFICE VISIT (OUTPATIENT)
Dept: FAMILY MEDICINE CLINIC | Age: 62
End: 2022-11-09
Payer: MEDICARE

## 2022-11-09 VITALS
SYSTOLIC BLOOD PRESSURE: 138 MMHG | TEMPERATURE: 97.3 F | BODY MASS INDEX: 20.5 KG/M2 | WEIGHT: 146.4 LBS | HEART RATE: 107 BPM | DIASTOLIC BLOOD PRESSURE: 72 MMHG | HEIGHT: 71 IN | OXYGEN SATURATION: 98 %

## 2022-11-09 DIAGNOSIS — R73.01 IFG (IMPAIRED FASTING GLUCOSE): ICD-10-CM

## 2022-11-09 DIAGNOSIS — G62.0 NEUROPATHY DUE TO CHEMOTHERAPEUTIC DRUG (HCC): ICD-10-CM

## 2022-11-09 DIAGNOSIS — R79.89 ABNORMAL LFTS: ICD-10-CM

## 2022-11-09 DIAGNOSIS — T45.1X5A NEUROPATHY DUE TO CHEMOTHERAPEUTIC DRUG (HCC): ICD-10-CM

## 2022-11-09 DIAGNOSIS — Z00.00 WELCOME TO MEDICARE PREVENTIVE VISIT: Primary | ICD-10-CM

## 2022-11-09 DIAGNOSIS — E78.5 HYPERLIPIDEMIA, UNSPECIFIED HYPERLIPIDEMIA TYPE: ICD-10-CM

## 2022-11-09 DIAGNOSIS — I10 ESSENTIAL HYPERTENSION: ICD-10-CM

## 2022-11-09 PROCEDURE — 3078F DIAST BP <80 MM HG: CPT | Performed by: INTERNAL MEDICINE

## 2022-11-09 PROCEDURE — 3017F COLORECTAL CA SCREEN DOC REV: CPT | Performed by: INTERNAL MEDICINE

## 2022-11-09 PROCEDURE — 3074F SYST BP LT 130 MM HG: CPT | Performed by: INTERNAL MEDICINE

## 2022-11-09 PROCEDURE — G0402 INITIAL PREVENTIVE EXAM: HCPCS | Performed by: INTERNAL MEDICINE

## 2022-11-09 RX ORDER — AMLODIPINE BESYLATE 10 MG/1
10 TABLET ORAL DAILY
Qty: 90 TABLET | Refills: 0 | Status: SHIPPED | OUTPATIENT
Start: 2022-11-09 | End: 2023-02-07

## 2022-11-09 ASSESSMENT — PATIENT HEALTH QUESTIONNAIRE - PHQ9
SUM OF ALL RESPONSES TO PHQ QUESTIONS 1-9: 0
SUM OF ALL RESPONSES TO PHQ QUESTIONS 1-9: 0
2. FEELING DOWN, DEPRESSED OR HOPELESS: 0
SUM OF ALL RESPONSES TO PHQ QUESTIONS 1-9: 0
SUM OF ALL RESPONSES TO PHQ QUESTIONS 1-9: 0
SUM OF ALL RESPONSES TO PHQ9 QUESTIONS 1 & 2: 0
1. LITTLE INTEREST OR PLEASURE IN DOING THINGS: 0

## 2022-11-09 ASSESSMENT — LIFESTYLE VARIABLES
HOW OFTEN DO YOU HAVE A DRINK CONTAINING ALCOHOL: 4 OR MORE TIMES A WEEK
HOW OFTEN DURING THE LAST YEAR HAVE YOU NEEDED AN ALCOHOLIC DRINK FIRST THING IN THE MORNING TO GET YOURSELF GOING AFTER A NIGHT OF HEAVY DRINKING: 0
HOW OFTEN DURING THE LAST YEAR HAVE YOU FOUND THAT YOU WERE NOT ABLE TO STOP DRINKING ONCE YOU HAD STARTED: 0
HAVE YOU OR SOMEONE ELSE BEEN INJURED AS A RESULT OF YOUR DRINKING: 0
HAS A RELATIVE, FRIEND, DOCTOR, OR ANOTHER HEALTH PROFESSIONAL EXPRESSED CONCERN ABOUT YOUR DRINKING OR SUGGESTED YOU CUT DOWN: 4
HOW MANY STANDARD DRINKS CONTAINING ALCOHOL DO YOU HAVE ON A TYPICAL DAY: 1 OR 2
HOW OFTEN DURING THE LAST YEAR HAVE YOU HAD A FEELING OF GUILT OR REMORSE AFTER DRINKING: 0
HOW OFTEN DURING THE LAST YEAR HAVE YOU BEEN UNABLE TO REMEMBER WHAT HAPPENED THE NIGHT BEFORE BECAUSE YOU HAD BEEN DRINKING: 0
HOW OFTEN DURING THE LAST YEAR HAVE YOU FAILED TO DO WHAT WAS NORMALLY EXPECTED FROM YOU BECAUSE OF DRINKING: 0

## 2022-11-09 NOTE — PATIENT INSTRUCTIONS
Learning About Benefits From Quitting Smoking  Why is it important to quit smoking? If you're thinking about quitting smoking, you may have a few reasons to be smoke-free. Your health may be one of them. When you quit smoking, you lower your risk for many serious health problems, such as cancer, lung disease, heart attack, stroke, blood vessel disease, and blindness from macular degeneration. When you're smoke-free, you get sick less often, and you heal faster. You are less likely to get colds, flu, bronchitis, and pneumonia. As a nonsmoker, you may find that your mood is better and you are less stressed. When and how will you feel healthier? Quitting has real health benefits that start from day 1 of being smoke-free. And the longer you stay smoke-free, the healthier you get and the better you feel. The first hours  After just 20 minutes, your blood pressure and heart rate go down. That means there's less stress on your heart and blood vessels. Within 12 hours, the level of carbon monoxide in your blood drops back to normal. That makes room for more oxygen. With more oxygen in your body, you may notice that you have more energy than when you smoked. After 2 weeks  Your lungs start to work better. Your risk of heart attack starts to drop. After 1 month  When your lungs are clear, you cough less and breathe deeper, so it's easier to be active. Your sense of taste and smell return. That means you can enjoy food more than you have since you started smoking. Over the years  Over the years, your risks of heart disease, heart attack, and stroke are lower. After 10 years, your risk of dying from lung cancer is cut by about half. And your risk for many other types of cancer is lower too. How would quitting help others in your life? When you quit smoking, you improve the health of everyone who now breathes in your smoke. Their heart, lung, and cancer risks drop, much like yours. They are sick less. For babies and small children, living smoke-free means they're less likely to have ear infections, pneumonia, and bronchitis. If you're a woman who is or will be pregnant someday, quitting smoking means a healthier . Children who are close to you are less likely to become adult smokers. Where can you learn more? Go to https://chpepiceweb.[employer]. org and sign in to your [employer] account. Enter [phone] in the [facility] box to learn more about \"Learning About Benefits From Quitting Smoking. \"     If you do not have an account, please click on the \"Sign Up Now\" link. Current as of: 2021               Content Version: 13.4  © 6894-7842 Healthwise, [employer]. Care instructions adapted under license by [facility] ([facility]). If you have questions about a medical condition or this instruction, always ask your healthcare professional. [place] [address] any warranty or liability for your use of this information. Personalized Preventive Plan for Kellen Walter - 2022  Medicare offers a range of preventive health benefits. Some of the tests and screenings are paid in full while other may be subject to a deductible, co-insurance, and/or copay. Some of these benefits include a comprehensive review of your medical history including lifestyle, illnesses that may run in your family, and various assessments and screenings as appropriate. After reviewing your medical record and screening and assessments performed today your provider may have ordered immunizations, labs, imaging, and/or referrals for you. A list of these orders (if applicable) as well as your Preventive Care list are included within your After Visit Summary for your review. Other Preventive Recommendations:    A preventive eye exam performed by an eye specialist is recommended every 1-2 years to screen for glaucoma; cataracts, macular degeneration, and other eye disorders.   A preventive dental visit is recommended every 6 months. Try to get at least 150 minutes of exercise per week or 10,000 steps per day on a pedometer . Order or download the FREE \"Exercise & Physical Activity: Your Everyday Guide\" from [employer]. Call 5-772.329.8479 or search [employer] online. You need 2000-6982 mg of calcium and 4995-3200 IU of vitamin D per day. It is possible to meet your calcium requirement with diet alone, but a vitamin D supplement is usually necessary to meet this goal.  When exposed to the sun, use a sunscreen that protects against both UVA and UVB radiation with an SPF of 30 or greater. Reapply every 2 to 3 hours or after sweating, drying off with a towel, or swimming. Always wear a seat belt when traveling in a car. Always wear a helmet when riding a bicycle or motorcycle.

## 2022-11-09 NOTE — PROGRESS NOTES
Medicare Annual Wellness Visit    Hal Hooker is here for Medicare AWV, Other (Pt did not get his labs done. ), and Health Maintenance (Flu vaccine, shingles vaccine, Pneumonia vaccine - pt declined  )    Assessment & Plan   Welcome to Medicare preventive visit  Neuropathy due to chemotherapeutic drug ([address]  Essential hypertension  -     amLODIPine (NORVASC) 10 MG tablet; Take 1 tablet by mouth daily, Disp-90 tablet, R-0Normal  -     Lipid, Fasting; Future  -     Comprehensive Metabolic Panel, Fasting; Future  -     TSH; Future  Abnormal LFTs  -     Comprehensive Metabolic Panel, Fasting; Future  Hyperlipidemia, unspecified hyperlipidemia type  -     Lipid, Fasting; Future  -     Comprehensive Metabolic Panel, Fasting; Future  -     TSH; Future  IFG (impaired fasting glucose)  -     Hemoglobin A1C; Future    Recommendations for Preventive Services Due: see orders and patient instructions/AVS.  Recommended screening schedule for the next 5-10 years is provided to the patient in written form: see Patient Instructions/AVS.     Return in 1 year (on 11/9/2023) for Medicare Annual Wellness Visit in 1 year. Subjective       Patient's complete Health Risk Assessment and screening values have been reviewed and are found in Flowsheets. The following problems were reviewed today and where indicated follow up appointments were made and/or referrals ordered.     Positive Risk Factor Screenings with Interventions:       Tobacco Use:  Tobacco Use: High Risk    Smoking Tobacco Use: Every Day    Smokeless Tobacco Use: Never    Passive Exposure: Not on file     E-cigarette/Vaping       Questions Responses    E-cigarette/Vaping Use Former User    Start Date     Passive Exposure     Quit Date     Counseling Given     Comments           Substance Use - Tobacco Interventions:  tobacco cessation tips and resources provided         General Health and ACP:  General  In general, how would you say your health is?: Excellent  In the past 7 days, have you experienced any of the following: New or Increased Pain, New or Increased Fatigue, Loneliness, Social Isolation, Stress or Anger?: No  Do you get the social and emotional support that you need?: Yes  Do you have a Living Will?: (!) No    Advance Directives       Power of Jasmyne Pina Will ACP-Advance Directive ACP-Power of     Not on File Not on File Not on File Not on File        General Health Risk Interventions:  No Living Will: Advance Care Planning addressed with patient today    Health Habits/Nutrition:  Physical Activity: Inactive    Days of Exercise per Week: 0 days    Minutes of Exercise per Session: 0 min     Have you lost any weight without trying in the past 3 months?: No  Body mass index: 20.42  Have you seen the dentist within the past year?: Yes  Health Habits/Nutrition Interventions:  -    Hearing/Vision:  Do you or your family notice any trouble with your hearing that hasn't been managed with hearing aids?: No  Do you have difficulty driving, watching TV, or doing any of your daily activities because of your eyesight?: No  Have you had an eye exam within the past year?: (!) No  No results found. Hearing/Vision Interventions:  Vision concerns:  patient encouraged to make appointment with his/her eye specialist            Objective   Vitals:    11/09/22 1016   BP: 138/72   Position: Sitting   Pulse: (!) 107   Temp: 97.3 °F (36.3 °C)   TempSrc: Temporal   SpO2: 98%   Weight: 146 lb 6.4 oz (66.4 kg)   Height: 5' 11\" (1.803 m)      Body mass index is 20.42 kg/m². No Known Allergies  Prior to Visit Medications    Medication Sig Taking?  Authorizing Provider   amLODIPine (NORVASC) 10 MG tablet Take 1 tablet by mouth daily Yes Angie Aviles MD   Multiple Vitamins-Minerals (MULTIVITAMIN GUMMIES MENS) CHEW Take 1 tablet by mouth daily as needed Yes Historical Provider, MD   gabapentin (NEURONTIN) 100 MG capsule TAKE ONE CAPSULE BY MOUTH THREE TIMES A DAY Anna Ríos MD       [facility] (Including outside providers/suppliers regularly involved in providing care):   Patient Care Team:  Brittaney Camejo MD as PCP - General (Internal Medicine)  Brittaney Camejo MD as PCP - [facility] Empaneled Provider  Hailey Mccormick MD as Medical Oncologist (Hematology and Oncology)     Reviewed and updated this visit:  Tobacco  Allergies  Meds  Problems  Med Hx  Surg Hx  Soc Hx  Fam Hx          Decline for Flu and prevnar 20 vaccine

## 2022-11-14 ENCOUNTER — HOSPITAL ENCOUNTER (OUTPATIENT)
Dept: INFUSION THERAPY | Age: 62
Discharge: HOME | End: 2022-11-14
Payer: MEDICARE

## 2022-11-14 DIAGNOSIS — C18.9 COLON CANCER METASTASIZED TO INTRA-ABDOMINAL LYMPH NODE (HCC): ICD-10-CM

## 2022-11-14 DIAGNOSIS — C77.2 COLON CANCER METASTASIZED TO INTRA-ABDOMINAL LYMPH NODE (HCC): ICD-10-CM

## 2022-11-14 LAB
ALBUMIN SERPL-MCNC: 4.8 G/DL (ref 3.5–5.2)
ALP BLD-CCNC: 112 U/L (ref 40–129)
ALT SERPL-CCNC: 51 U/L (ref 0–40)
ANION GAP SERPL CALCULATED.3IONS-SCNC: 17 MMOL/L (ref 7–16)
AST SERPL-CCNC: 128 U/L (ref 0–39)
BASOPHILS ABSOLUTE: 0.07 E9/L (ref 0–0.2)
BASOPHILS RELATIVE PERCENT: 1.1 % (ref 0–2)
BILIRUB SERPL-MCNC: 0.7 MG/DL (ref 0–1.2)
BUN BLDV-MCNC: 7 MG/DL (ref 6–23)
CALCIUM SERPL-MCNC: 10.1 MG/DL (ref 8.6–10.2)
CEA: 7.4 NG/ML (ref 0–5.2)
CHLORIDE BLD-SCNC: 92 MMOL/L (ref 98–107)
CO2: 23 MMOL/L (ref 22–29)
CREAT SERPL-MCNC: 1 MG/DL (ref 0.7–1.2)
EOSINOPHILS ABSOLUTE: 0.04 E9/L (ref 0.05–0.5)
EOSINOPHILS RELATIVE PERCENT: 0.6 % (ref 0–6)
GFR SERPL CREATININE-BSD FRML MDRD: >60 ML/MIN/1.73
GLUCOSE BLD-MCNC: 112 MG/DL (ref 74–99)
HCT VFR BLD CALC: 41.6 % (ref 37–54)
HEMOGLOBIN: 14.1 G/DL (ref 12.5–16.5)
IMMATURE GRANULOCYTES #: 0.04 E9/L
IMMATURE GRANULOCYTES %: 0.6 % (ref 0–5)
LYMPHOCYTES ABSOLUTE: 0.94 E9/L (ref 1.5–4)
LYMPHOCYTES RELATIVE PERCENT: 14.9 % (ref 20–42)
MCH RBC QN AUTO: 33 PG (ref 26–35)
MCHC RBC AUTO-ENTMCNC: 33.9 % (ref 32–34.5)
MCV RBC AUTO: 97.4 FL (ref 80–99.9)
MONOCYTES ABSOLUTE: 0.62 E9/L (ref 0.1–0.95)
MONOCYTES RELATIVE PERCENT: 9.8 % (ref 2–12)
NEUTROPHILS ABSOLUTE: 4.6 E9/L (ref 1.8–7.3)
NEUTROPHILS RELATIVE PERCENT: 73 % (ref 43–80)
PDW BLD-RTO: 13.1 FL (ref 11.5–15)
PLATELET # BLD: 236 E9/L (ref 130–450)
PMV BLD AUTO: 8.8 FL (ref 7–12)
POTASSIUM SERPL-SCNC: 4.1 MMOL/L (ref 3.5–5)
RBC # BLD: 4.27 E12/L (ref 3.8–5.8)
SODIUM BLD-SCNC: 132 MMOL/L (ref 132–146)
TOTAL PROTEIN: 8.2 G/DL (ref 6.4–8.3)
WBC # BLD: 6.3 E9/L (ref 4.5–11.5)

## 2022-11-14 PROCEDURE — 36415 COLL VENOUS BLD VENIPUNCTURE: CPT

## 2022-11-14 PROCEDURE — 85025 COMPLETE CBC W/AUTO DIFF WBC: CPT

## 2022-11-14 PROCEDURE — 82378 CARCINOEMBRYONIC ANTIGEN: CPT

## 2022-11-14 PROCEDURE — 80053 COMPREHEN METABOLIC PANEL: CPT

## 2022-11-15 ENCOUNTER — OFFICE VISIT (OUTPATIENT)
Dept: ONCOLOGY | Age: 62
End: 2022-11-15
Payer: MEDICARE

## 2022-11-15 VITALS
HEIGHT: 71 IN | OXYGEN SATURATION: 98 % | HEART RATE: 110 BPM | DIASTOLIC BLOOD PRESSURE: 88 MMHG | BODY MASS INDEX: 20.51 KG/M2 | SYSTOLIC BLOOD PRESSURE: 165 MMHG | WEIGHT: 146.5 LBS

## 2022-11-15 DIAGNOSIS — C18.9 COLON CANCER METASTASIZED TO INTRA-ABDOMINAL LYMPH NODE (HCC): Primary | ICD-10-CM

## 2022-11-15 DIAGNOSIS — C77.2 COLON CANCER METASTASIZED TO INTRA-ABDOMINAL LYMPH NODE (HCC): Primary | ICD-10-CM

## 2022-11-15 PROCEDURE — 99212 OFFICE O/P EST SF 10 MIN: CPT

## 2022-11-15 RX ORDER — CHLORHEXIDINE GLUCONATE 0.12 MG/ML
RINSE ORAL
COMMUNITY
Start: 2022-11-08

## 2022-11-15 RX ORDER — CLINDAMYCIN HYDROCHLORIDE 300 MG/1
CAPSULE ORAL
COMMUNITY
Start: 2022-11-08

## 2022-11-15 NOTE — PROGRESS NOTES
[address]  [facility]   Hematology/Oncology  Consult      Patient Name: Nathan Paez  YOB: 1960  PCP: Tonia Tapia MD   Referring Provider:      Reason for Consultation:   Chief Complaint   Patient presents with    Colon Cancer        Subjective:  Feels well today, no acute complaints or issues in interim. BMs unchanged, no bleeding, no weight loss. Neuropathy stable. Still smoking ~1/2 ppd. No new issues    History of Present Illness: This pt is a pleasant 62 yo male who was diagnosed with colon cancer in 11/19 (reportedly stage IIIB), s/p resection and 2/31 LNs positive, and reportedly received adj chemotherapy and has chronic neuropathy from this. No records are available for review today, however he states he received 8 cycles of full dose FOLFOX and 4 cycles of DR FOLFOX. He still has his PORT. He has no acute complaints today, including abdominal pain, change in bowel habits, pain with defecation, tenesmus, hematochezia or melena. He has maintained a stable weight.  He overall feels well    Diagnostic Data:     Past Medical History:   Diagnosis Date    Cataract     bilateral    Colon cancer ([address]     Hypertension     Neuropathy     finger/feet from chemotherapy       Patient Active Problem List    Diagnosis Date Noted    Abnormal LFTs 03/25/2021    Alcohol use 03/25/2021    Colon cancer metastasized to intra-abdominal lymph node ([address] 12/17/2020    Neuropathy due to chemotherapeutic drug ([address] 12/17/2020    Essential hypertension 12/17/2020    Smoker 12/17/2020        Past Surgical History:   Procedure Laterality Date    CATHETER REMOVAL Left 8/11/2022    MEDIPORT CATHETER REMOVAL performed by Braden Whiteside MD at [facility]  2019    COLONOSCOPY      EYE SURGERY Bilateral     MEDIPORT INSERTION, SINGLE Left 2020    Done in [address]  11/21/2019       Family History  Family History Problem Relation Age of Onset    Stroke Mother     High Blood Pressure Mother     Heart Disease Father     Heart Attack Father     Kidney Disease Sister     Cancer Brother         Prostate    No Known Problems Maternal Aunt     No Known Problems Maternal Uncle     No Known Problems Paternal Aunt     No Known Problems Paternal Uncle     No Known Problems Maternal Grandmother     No Known Problems Maternal Grandfather     No Known Problems Paternal Grandmother     No Known Problems Paternal Grandfather     No Known Problems Maternal Cousin     No Known Problems Paternal Cousin     No Known Problems Brother     Anxiety Disorder Daughter     No Known Problems Grandchild        Social History    TOBACCO:   reports that he has been smoking cigarettes. He started smoking about 44 years ago. He has a 20.00 pack-year smoking history. He has never used smokeless tobacco.  ETOH:   reports current alcohol use of about 70.0 standard drinks per week. Home Medications  Prior to Admission medications    Medication Sig Start Date End Date Taking?  Authorizing Provider   amLODIPine (NORVASC) 10 MG tablet Take 1 tablet by mouth daily 11/9/22 2/7/23 Yes Dorian Harper MD   Multiple Vitamins-Minerals (MULTIVITAMIN GUMMIES MENS) CHEW Take 1 tablet by mouth daily as needed   Yes Historical Provider, MD   chlorhexidine (PERIDEX) 0.12 % solution RINSE 15ML IN MOUTH TWICE A DAY UNTIL GONE  Patient not taking: Reported on 11/15/2022 11/8/22   Historical Provider, MD   clindamycin (CLEOCIN) 300 MG capsule TAKE 2 CAPSULES BY MOUTH NOW  THEN TAKE 1 CAPSULE 4 TIMES A DAY FOR 7 DAYS  Patient not taking: Reported on 11/15/2022 11/8/22   Historical Provider, MD   gabapentin (NEURONTIN) 100 MG capsule TAKE ONE CAPSULE BY MOUTH THREE TIMES A DAY 6/24/22 9/22/22  Kemal Reid MD       Allergies  No Known Allergies    Review of Systems:    + for neuropathy of BL U/LE      Objective  BP (!) 165/88   Pulse (!) 110   Ht 5' 11\" (1.803 m) Wt 146 lb 8 oz (66.5 kg)   SpO2 98%   BMI 20.43 kg/m²     Physical Performance Status    Physical Exam:  General: AAO to person, place, time, and purpose, appears stated age, cooperative, no acute distress, pleasant   Head and neck : PERRLA, EOMI . Sclera non icteric. Oropharynx : Clear  Neck: no JVD,  no adenopathy  LYMPHATICS : No LAD  Lungs: Clear to auscultation   Heart: Regular rate and regular rhythm, no murmur, normal S1, S2  Abdomen: Soft, non-tender;no masses, no organomegaly  Extremities: No edema,no cyanosis, no clubbing. Skin:  No rash. Neurologic:Cranial nerves grossly intact. No focal motor or sensory deficits . Recent Laboratory Data-   Lab Results   Component Value Date    WBC 6.3 11/14/2022    HGB 14.1 11/14/2022    HCT 41.6 11/14/2022    MCV 97.4 11/14/2022     11/14/2022    LYMPHOPCT 14.9 (L) 11/14/2022    RBC 4.27 11/14/2022    MCH 33.0 11/14/2022    MCHC 33.9 11/14/2022    RDW 13.1 11/14/2022    NEUTOPHILPCT 73.0 11/14/2022    MONOPCT 9.8 11/14/2022    BASOPCT 1.1 11/14/2022    NEUTROABS 4.60 11/14/2022    LYMPHSABS 0.94 (L) 11/14/2022    MONOSABS 0.62 11/14/2022    EOSABS 0.04 (L) 11/14/2022    BASOSABS 0.07 11/14/2022       Lab Results   Component Value Date     11/14/2022    K 4.1 11/14/2022    CL 92 (L) 11/14/2022    CO2 23 11/14/2022    BUN 7 11/14/2022    CREATININE 1.0 11/14/2022    GLUCOSE 112 (H) 11/14/2022    CALCIUM 10.1 11/14/2022    PROT 8.2 11/14/2022    LABALBU 4.8 11/14/2022    BILITOT 0.7 11/14/2022    ALKPHOS 112 11/14/2022     (H) 11/14/2022    ALT 51 (H) 11/14/2022    LABGLOM >60 11/14/2022    GFRAA >60 08/04/2022       Lab Results   Component Value Date    IRON 140 01/26/2021    TIBC 355 01/26/2021    FERRITIN 214 01/26/2021           Radiology-    No results found. ASSESSMENT/PLAN :    There are no diagnoses linked to this encounter.    64 yo male  Reportedly history of stage IIIB colon cancer  S/p resection and adj FOLFOX x 12 with some DR at the end  Iatrogenic neuropathy    - Will request records from Dr. Hansa Can including pathology (reported 2/31 LNs+)  - He is due for CT A/P and this is ordered today  - Will need referral at next visit for colonoscopy  - CBC/CMP/CEA today  - RTC in 1 months to review results, then q 3 months with port flush q ~6 weeks    1/19/21  - Stage III B colon cancer s/p resection and 12 cycles adj FOLFOX  - CT A/P with no evidence of recurrence of metastatic disease. Diffuse hepatic steatosis was noted  - CBC and CMP was WNL with the exception of elevated LFTs, likely due to the steatosis  - CEA 7.4, will trend  - Refer to Dr. Aries Sánchez for colonoscopy surveillance  - PORT flush q 6 weeks  - RTC in 3 months with repeat labs    4/20/21  - Stage III B colon cancer s/p resection and 12 cycles adj FOLFOX  - CT A/P as above  - CBC and CMP was WNL with the exception of elevated LFTs (though improved from prior), likely due to the steatosis  - CEA up from 7.4 to 8.2. Still smoking 1 ppd, will work on quitting   - Referred to Dr. Aries Sánchez for colonoscopy surveillance  - PORT flush q 6 weeks  - RTC in 3 months with repeat labs at CT A/P    7/13/21  - Stage III B colon cancer s/p resection and 12 cycles adj FOLFOX (6/20)  - CT A/P negative for recurrence, hepatic steatosis noted  - CBC and CMP was WNL. Elevated LFTs further improved, likely due to the steatosis  - CEA down to 7.0. Still smoking 1 ppd, discussed cessation again  - Dr. Aries Sánchez for colonoscopy surveillance  - PORT flush q 6 weeks  - RTC in 3 months    10/12/21  - Stage III B colon cancer s/p resection and 12 cycles adj FOLFOX (6/20)  - CT A/P due prior to follow up, ordered  - CBC and CMP was WNL. Elevated LFTs improved further  - CEA down to 6.6. Still smoking, but down to a pack q 2 days, cessation counseling provided  - Dr. Aries Sánchez for colonoscopy surveillance  - PORT flush q 6 weeks.  Can have out at 2 years  - RTC in 3 months    1/11/22  - Stage III B colon cancer s/p resection and 12 cycles adj FOLFOX (6/20)  - CT A/P without evidence of recurrence   - CBC WNL. CMP stable  - CEA stable at 6.6. Still smoking ~10 cigs a day. Further discussed cessation and ways to cut down and eventually quit  - Dr. Que Love for colonoscopy surveillance  - PORT flush q 6 weeks. Can have out at 2 years  - RTC in 3 months    4/12/22  - Stage III B colon cancer s/p resection and 12 cycles adj FOLFOX (6/20)  - CT A/P without evidence of recurrence (10/21). Repeat prior to follow up  - CBC WNL. CMP stable  - CEA pending today, previously 6.6. Smoking < than 10 cigs a day. Further discussed cessation and ways to cut down and eventually quit, he continues to work on this  - Dr. Que Love for colonoscopy surveillance  - PORT flush q 6 weeks. Can have out at 2 years  - RTC in 3 months    7/12/22  - Stage III B colon cancer s/p resection and 12 cycles adj FOLFOX (6/20)  - CT A/P (4/29/22) without evidence of recurrence  - Labs reviewed, stable  - CEA pending today, previously 5.5. Smoking < than 10 cigs a day. Further discussed cessation and ways to cut down and eventually quit, he continues to work on this  - Dr. Que Love for colonoscopy surveillance  - PORT can come out, refer to Dr. Bibiana Feng  - RTC in 4 months    11/15/22  - Stage III B colon cancer s/p resection and 12 cycles adj FOLFOX (6/20)  - CT A/P (4/29/22) without evidence of recurrence. Repeat at 1 year  - Labs reviewed, stable  - CEA pending today, previously 7.4  - Smoking < than 10 cigs a day.  Discussed again cessation methods  - Dr. Que Love for colonoscopy surveillance  - RTC in 4 months    Akua Dickson MD   Electronically signed 11/15/2022 at 9:17 AM

## 2023-02-08 ENCOUNTER — OFFICE VISIT (OUTPATIENT)
Dept: FAMILY MEDICINE CLINIC | Age: 63
End: 2023-02-08
Payer: MEDICARE

## 2023-02-08 VITALS
BODY MASS INDEX: 20.56 KG/M2 | OXYGEN SATURATION: 97 % | TEMPERATURE: 97.7 F | HEART RATE: 109 BPM | WEIGHT: 146.9 LBS | DIASTOLIC BLOOD PRESSURE: 72 MMHG | HEIGHT: 71 IN | SYSTOLIC BLOOD PRESSURE: 126 MMHG

## 2023-02-08 DIAGNOSIS — C77.2 COLON CANCER METASTASIZED TO INTRA-ABDOMINAL LYMPH NODE (HCC): ICD-10-CM

## 2023-02-08 DIAGNOSIS — L40.9 PSORIASIS: ICD-10-CM

## 2023-02-08 DIAGNOSIS — T45.1X5A NEUROPATHY DUE TO CHEMOTHERAPEUTIC DRUG (HCC): ICD-10-CM

## 2023-02-08 DIAGNOSIS — I10 ESSENTIAL HYPERTENSION: Primary | ICD-10-CM

## 2023-02-08 DIAGNOSIS — G62.0 NEUROPATHY DUE TO CHEMOTHERAPEUTIC DRUG (HCC): ICD-10-CM

## 2023-02-08 DIAGNOSIS — C18.9 COLON CANCER METASTASIZED TO INTRA-ABDOMINAL LYMPH NODE (HCC): ICD-10-CM

## 2023-02-08 PROCEDURE — G8420 CALC BMI NORM PARAMETERS: HCPCS | Performed by: INTERNAL MEDICINE

## 2023-02-08 PROCEDURE — 3017F COLORECTAL CA SCREEN DOC REV: CPT | Performed by: INTERNAL MEDICINE

## 2023-02-08 PROCEDURE — 3074F SYST BP LT 130 MM HG: CPT | Performed by: INTERNAL MEDICINE

## 2023-02-08 PROCEDURE — 3078F DIAST BP <80 MM HG: CPT | Performed by: INTERNAL MEDICINE

## 2023-02-08 PROCEDURE — G8484 FLU IMMUNIZE NO ADMIN: HCPCS | Performed by: INTERNAL MEDICINE

## 2023-02-08 PROCEDURE — 99214 OFFICE O/P EST MOD 30 MIN: CPT | Performed by: INTERNAL MEDICINE

## 2023-02-08 PROCEDURE — G8427 DOCREV CUR MEDS BY ELIG CLIN: HCPCS | Performed by: INTERNAL MEDICINE

## 2023-02-08 PROCEDURE — 4004F PT TOBACCO SCREEN RCVD TLK: CPT | Performed by: INTERNAL MEDICINE

## 2023-02-08 RX ORDER — AMLODIPINE BESYLATE 10 MG/1
10 TABLET ORAL DAILY
Qty: 90 TABLET | Refills: 0 | Status: SHIPPED | OUTPATIENT
Start: 2023-02-08 | End: 2023-05-09

## 2023-02-08 RX ORDER — MOMETASONE FUROATE 1 MG/G
CREAM TOPICAL
Qty: 50 G | Refills: 1 | Status: SHIPPED | OUTPATIENT
Start: 2023-02-08

## 2023-02-08 SDOH — ECONOMIC STABILITY: HOUSING INSECURITY
IN THE LAST 12 MONTHS, WAS THERE A TIME WHEN YOU DID NOT HAVE A STEADY PLACE TO SLEEP OR SLEPT IN A SHELTER (INCLUDING NOW)?: NO

## 2023-02-08 SDOH — ECONOMIC STABILITY: FOOD INSECURITY: WITHIN THE PAST 12 MONTHS, YOU WORRIED THAT YOUR FOOD WOULD RUN OUT BEFORE YOU GOT MONEY TO BUY MORE.: NEVER TRUE

## 2023-02-08 SDOH — ECONOMIC STABILITY: FOOD INSECURITY: WITHIN THE PAST 12 MONTHS, THE FOOD YOU BOUGHT JUST DIDN'T LAST AND YOU DIDN'T HAVE MONEY TO GET MORE.: NEVER TRUE

## 2023-02-08 SDOH — ECONOMIC STABILITY: INCOME INSECURITY: HOW HARD IS IT FOR YOU TO PAY FOR THE VERY BASICS LIKE FOOD, HOUSING, MEDICAL CARE, AND HEATING?: SOMEWHAT HARD

## 2023-02-08 ASSESSMENT — PATIENT HEALTH QUESTIONNAIRE - PHQ9
SUM OF ALL RESPONSES TO PHQ9 QUESTIONS 1 & 2: 0
SUM OF ALL RESPONSES TO PHQ QUESTIONS 1-9: 0
SUM OF ALL RESPONSES TO PHQ QUESTIONS 1-9: 0
2. FEELING DOWN, DEPRESSED OR HOPELESS: 0
SUM OF ALL RESPONSES TO PHQ QUESTIONS 1-9: 0
SUM OF ALL RESPONSES TO PHQ QUESTIONS 1-9: 0
1. LITTLE INTEREST OR PLEASURE IN DOING THINGS: 0

## 2023-02-08 ASSESSMENT — ENCOUNTER SYMPTOMS
EYE DISCHARGE: 0
ABDOMINAL PAIN: 0
NAUSEA: 0
SHORTNESS OF BREATH: 0
BLOOD IN STOOL: 0

## 2023-02-08 NOTE — PROGRESS NOTES
Chief Complaint   Patient presents with    Hypertension     Pt here for 3 month follow up on hypertension, pt reports feeling well. Psoriasis     C/o Psoriasis on legs and arms. Health Maintenance     Pneumonia vaccine, Shingles vaccine, Flu vaccine, DTaP - pt declined       HPI:  Patient is here for follow-up       Allergy and Medications are reviewed and updated. Past Medical History, Surgical History, and Family History has been reviewed and updated. Review of Systems:  Review of Systems   Constitutional:  Negative for chills and fever. HENT:  Negative for congestion and ear pain. Eyes:  Negative for discharge. Respiratory:  Negative for shortness of breath (No new SOB). Cardiovascular:  Negative for chest pain and leg swelling. Gastrointestinal:  Negative for abdominal pain, blood in stool and nausea. Endocrine: Negative for polydipsia. Genitourinary:  Negative for flank pain and hematuria. Musculoskeletal:  Negative for myalgias and neck pain. Skin:  Negative for rash. Neurological:  Negative for dizziness and seizures. Hematological:  Does not bruise/bleed easily. Psychiatric/Behavioral:  Negative for hallucinations and suicidal ideas. Vitals:    02/08/23 1015   BP: 126/72   Position: Sitting   Pulse: (!) 109   Temp: 97.7 °F (36.5 °C)   TempSrc: Temporal   SpO2: 97%   Weight: 146 lb 14.4 oz (66.6 kg)   Height: 5' 11\" (1.803 m)       Physical Exam  Vitals reviewed. Constitutional:       Appearance: He is well-developed. HENT:      Head: Normocephalic and atraumatic. Eyes:      Conjunctiva/sclera: Conjunctivae normal.      Pupils: Pupils are equal, round, and reactive to light. Neck:      Vascular: No JVD. Cardiovascular:      Rate and Rhythm: Normal rate and regular rhythm. Pulmonary:      Effort: Pulmonary effort is normal.      Breath sounds: Normal breath sounds. Abdominal:      General: Bowel sounds are normal.      Palpations: Abdomen is soft. Musculoskeletal:         General: Normal range of motion. Skin:     General: Skin is warm and dry. Findings: Rash (Psoriasis on extremities) present. Neurological:      Mental Status: He is alert and oriented to person, place, and time. Psychiatric:         Behavior: Behavior normal.        Labs :    Lab Results   Component Value Date    WBC 6.3 11/14/2022    HGB 14.1 11/14/2022    HCT 41.6 11/14/2022     11/14/2022    CHOL 286 (H) 12/17/2020    TRIG 49 12/17/2020     12/17/2020    ALT 51 (H) 11/14/2022     (H) 11/14/2022     11/14/2022    K 4.1 11/14/2022    CL 92 (L) 11/14/2022    CREATININE 1.0 11/14/2022    BUN 7 11/14/2022    CO2 23 11/14/2022     Lab Results   Component Value Date/Time    COLORU Yellow 12/17/2020 11:07 AM    NITRU Negative 12/17/2020 11:07 AM    GLUCOSEU Negative 12/17/2020 11:07 AM    KETUA Negative 12/17/2020 11:07 AM    UROBILINOGEN 0.2 12/17/2020 11:07 AM    BILIRUBINUR Negative 12/17/2020 11:07 AM     Lab Results   Component Value Date/Time    CEA 7.4 11/14/2022 09:18 AM             ASSESSMENT     Patient Active Problem List    Diagnosis Date Noted    Abnormal LFTs 03/25/2021    Alcohol use 03/25/2021    Colon cancer metastasized to intra-abdominal lymph node ([address] 12/17/2020     Overview Note:     6 cm adenoca transverse colon causing obstruction.  T4a, N1a, M0, st IIIB  Extended right and transverse colectomy with ileocolic anastomosis 29/86/4043         Assessment & Plan Note:      Monitored by specialist- no acute findings meriting change in the plan        Neuropathy due to chemotherapeutic drug ([address] 12/17/2020     Assessment & Plan Note:       Control symptoms, Pt is trying to decrease the dose of MORGAN         Essential hypertension 12/17/2020    Smoker 12/17/2020     Overview Note:     Declines bupropion  Declines Chantix- daughter had bad experience  Considering nicotine gum  Declines referral for CBT  Has quit cold turkey in past Diagnosis:   1. Essential hypertension  -     CBC with Auto Differential; Future  -     Comprehensive Metabolic Panel, Fasting; Future  -     Lipid, Fasting; Future  -     TSH; Future  -     Urinalysis with Microscopic; Future  -     amLODIPine (NORVASC) 10 MG tablet; Take 1 tablet by mouth daily, Disp-90 tablet, R-0Normal  2. Psoriasis  -     mometasone (ELOCON) 0.1 % cream; Apply topically daily. , Disp-50 g, R-1, Normal  -     Amb External Referral To Dermatology  3. Neuropathy due to chemotherapeutic drug [facility])  Assessment & Plan:    Control symptoms, Pt is trying to decrease the dose of MORGAN     4. Colon cancer metastasized to intra-abdominal lymph node [facility])  Assessment & Plan:   Monitored by specialist- no acute findings meriting change in the plan         PLAN:       Pt is stable on current medical treatment. Continue current treatment plan    Side effects/Adverse effects/Precautions are reviewed with patient. Low salt, Low Carb diet an low fat diet  Continue medications as advised and take them regularly  Regular exercises as advised    Discussed natural and expected course of this diagnosis and need to alert me if symptoms do not follow expected course, or if any worse. Smoking cessation if applicable, discussed with patient. Ref to derm    Elocon cream mean time for psoriasis    There are no Patient Instructions on file for this visit. Return in about 3 months (around 5/8/2023).

## 2023-03-14 ENCOUNTER — HOSPITAL ENCOUNTER (OUTPATIENT)
Dept: INFUSION THERAPY | Age: 63
Discharge: HOME | End: 2023-03-14
Payer: MEDICARE

## 2023-03-14 ENCOUNTER — OFFICE VISIT (OUTPATIENT)
Dept: ONCOLOGY | Age: 63
End: 2023-03-14
Payer: MEDICARE

## 2023-03-14 VITALS
OXYGEN SATURATION: 100 % | HEIGHT: 71 IN | WEIGHT: 145.1 LBS | DIASTOLIC BLOOD PRESSURE: 88 MMHG | TEMPERATURE: 97.7 F | BODY MASS INDEX: 20.31 KG/M2 | HEART RATE: 70 BPM | SYSTOLIC BLOOD PRESSURE: 136 MMHG

## 2023-03-14 DIAGNOSIS — C77.2 COLON CANCER METASTASIZED TO INTRA-ABDOMINAL LYMPH NODE (HCC): Primary | ICD-10-CM

## 2023-03-14 DIAGNOSIS — C18.9 COLON CANCER METASTASIZED TO INTRA-ABDOMINAL LYMPH NODE (HCC): Primary | ICD-10-CM

## 2023-03-14 DIAGNOSIS — C18.9 COLON CANCER METASTASIZED TO INTRA-ABDOMINAL LYMPH NODE (HCC): ICD-10-CM

## 2023-03-14 DIAGNOSIS — C77.2 COLON CANCER METASTASIZED TO INTRA-ABDOMINAL LYMPH NODE (HCC): ICD-10-CM

## 2023-03-14 LAB
ALBUMIN SERPL-MCNC: 4.9 G/DL (ref 3.5–5.2)
ALP BLD-CCNC: 113 U/L (ref 40–129)
ALT SERPL-CCNC: 52 U/L (ref 0–40)
ANION GAP SERPL CALCULATED.3IONS-SCNC: 15 MMOL/L (ref 7–16)
AST SERPL-CCNC: 117 U/L (ref 0–39)
BASOPHILS ABSOLUTE: 0.06 E9/L (ref 0–0.2)
BASOPHILS RELATIVE PERCENT: 0.9 % (ref 0–2)
BILIRUB SERPL-MCNC: 0.5 MG/DL (ref 0–1.2)
BUN BLDV-MCNC: 6 MG/DL (ref 6–23)
CALCIUM SERPL-MCNC: 9.6 MG/DL (ref 8.6–10.2)
CEA: 8.3 NG/ML (ref 0–5.2)
CHLORIDE BLD-SCNC: 96 MMOL/L (ref 98–107)
CO2: 25 MMOL/L (ref 22–29)
CREAT SERPL-MCNC: 0.9 MG/DL (ref 0.7–1.2)
EOSINOPHILS ABSOLUTE: 0.06 E9/L (ref 0.05–0.5)
EOSINOPHILS RELATIVE PERCENT: 0.9 % (ref 0–6)
GFR SERPL CREATININE-BSD FRML MDRD: >60 ML/MIN/1.73
GLUCOSE BLD-MCNC: 117 MG/DL (ref 74–99)
HCT VFR BLD CALC: 40.2 % (ref 37–54)
HEMOGLOBIN: 13.8 G/DL (ref 12.5–16.5)
IMMATURE GRANULOCYTES #: 0.02 E9/L
IMMATURE GRANULOCYTES %: 0.3 % (ref 0–5)
LYMPHOCYTES ABSOLUTE: 0.85 E9/L (ref 1.5–4)
LYMPHOCYTES RELATIVE PERCENT: 13.2 % (ref 20–42)
MCH RBC QN AUTO: 33.3 PG (ref 26–35)
MCHC RBC AUTO-ENTMCNC: 34.3 % (ref 32–34.5)
MCV RBC AUTO: 96.9 FL (ref 80–99.9)
MONOCYTES ABSOLUTE: 0.73 E9/L (ref 0.1–0.95)
MONOCYTES RELATIVE PERCENT: 11.3 % (ref 2–12)
NEUTROPHILS ABSOLUTE: 4.72 E9/L (ref 1.8–7.3)
NEUTROPHILS RELATIVE PERCENT: 73.4 % (ref 43–80)
PDW BLD-RTO: 13.4 FL (ref 11.5–15)
PLATELET # BLD: 211 E9/L (ref 130–450)
PMV BLD AUTO: 8.7 FL (ref 7–12)
POTASSIUM SERPL-SCNC: 3.9 MMOL/L (ref 3.5–5)
RBC # BLD: 4.15 E12/L (ref 3.8–5.8)
SODIUM BLD-SCNC: 136 MMOL/L (ref 132–146)
TOTAL PROTEIN: 8.2 G/DL (ref 6.4–8.3)
WBC # BLD: 6.4 E9/L (ref 4.5–11.5)

## 2023-03-14 PROCEDURE — 85025 COMPLETE CBC W/AUTO DIFF WBC: CPT

## 2023-03-14 PROCEDURE — 82378 CARCINOEMBRYONIC ANTIGEN: CPT

## 2023-03-14 PROCEDURE — 80053 COMPREHEN METABOLIC PANEL: CPT

## 2023-03-14 PROCEDURE — 99213 OFFICE O/P EST LOW 20 MIN: CPT

## 2023-03-14 PROCEDURE — 36415 COLL VENOUS BLD VENIPUNCTURE: CPT

## 2023-03-14 NOTE — PROGRESS NOTES
[address]  [facility]   Hematology/Oncology  Consult      Patient Name: Iris Phan  YOB: 1960  PCP: Amita Krishnamurthy MD   Referring Provider:      Reason for Consultation:   Chief Complaint   Patient presents with    Colon Cancer    Cancer    Follow-up        Subjective:  Feels well today, no acute complaints or issues in interim. BMs unchanged, no bleeding, no weight loss. Neuropathy stable. Still smoking ~1/2 ppd. No new issues in interim to report    History of Present Illness: This pt is a pleasant 62 yo male who was diagnosed with colon cancer in 11/19 (reportedly stage IIIB), s/p resection and 2/31 LNs positive, and reportedly received adj chemotherapy and has chronic neuropathy from this. No records are available for review today, however he states he received 8 cycles of full dose FOLFOX and 4 cycles of DR FOLFOX. He still has his PORT. He has no acute complaints today, including abdominal pain, change in bowel habits, pain with defecation, tenesmus, hematochezia or melena. He has maintained a stable weight.  He overall feels well    Diagnostic Data:     Past Medical History:   Diagnosis Date    Cataract     bilateral    Colon cancer ([address]     Hypertension     Neuropathy     finger/feet from chemotherapy       Patient Active Problem List    Diagnosis Date Noted    Abnormal LFTs 03/25/2021    Alcohol use 03/25/2021    Colon cancer metastasized to intra-abdominal lymph node ([address] 12/17/2020    Neuropathy due to chemotherapeutic drug ([address] 12/17/2020    Essential hypertension 12/17/2020    Smoker 12/17/2020        Past Surgical History:   Procedure Laterality Date    CATHETER REMOVAL Left 8/11/2022    MEDIPORT CATHETER REMOVAL performed by Siddhartha Hoyos MD at [facility]  2019    COLONOSCOPY      EYE SURGERY Bilateral     [facility] SINGLE Left 2020    [facility]       Family History  Family History   Problem Relation Age of Onset    Stroke Mother     High Blood Pressure Mother     Heart Disease Father     Heart Attack Father     Kidney Disease Sister     Cancer Brother         Prostate    No Known Problems Maternal Aunt     No Known Problems Maternal Uncle     No Known Problems Paternal Aunt     No Known Problems Paternal Uncle     No Known Problems Maternal Grandmother     No Known Problems Maternal Grandfather     No Known Problems Paternal Grandmother     No Known Problems Paternal Grandfather     No Known Problems Maternal Cousin     No Known Problems Paternal Cousin     No Known Problems Brother     Anxiety Disorder Daughter     No Known Problems Grandchild        Social History    TOBACCO:   reports that he has been smoking cigarettes. He started smoking about 45 years ago. He has a 20.00 pack-year smoking history. He has never used smokeless tobacco.  ETOH:   reports current alcohol use of about 70.0 standard drinks per week. Home Medications  Prior to Admission medications    Medication Sig Start Date End Date Taking? Authorizing Provider   mometasone (ELOCON) 0.1 % cream Apply topically daily. 2/8/23  Yes Stephenie Gale MD   amLODIPine (NORVASC) 10 MG tablet Take 1 tablet by mouth daily 2/8/23 5/9/23 Yes Stephenie Gale MD   Multiple Vitamins-Minerals (MULTIVITAMIN GUMMIES MENS) CHEW Take 1 tablet by mouth daily as needed   Yes Historical Provider, MD   gabapentin (NEURONTIN) 100 MG capsule TAKE ONE CAPSULE BY MOUTH THREE TIMES A DAY  Patient taking differently: Take 100 mg by mouth daily.  6/24/22 2/8/23  Monica Stiles MD       Allergies  No Known Allergies    Review of Systems:    + for neuropathy of BL U/LE      Objective  /88   Pulse 70   Temp 97.7 °F (36.5 °C)   Ht 5' 11\" (1.803 m)   Wt 145 lb 1.6 oz (65.8 kg)   SpO2 100%   BMI 20.24 kg/m²     Physical Performance Status    Physical Exam:  General: AAO to person, place, time, and purpose, appears stated age, cooperative, no acute distress, pleasant   Head and neck : PERRLA, EOMI . Sclera non icteric. Oropharynx : Clear  Neck: no JVD,  no adenopathy  LYMPHATICS : No LAD  Lungs: Clear to auscultation   Heart: Regular rate and regular rhythm, no murmur, normal S1, S2  Abdomen: Soft, non-tender;no masses, no organomegaly  Extremities: No edema,no cyanosis, no clubbing. Skin:  No rash. Neurologic:Cranial nerves grossly intact. No focal motor or sensory deficits . Recent Laboratory Data-   Lab Results   Component Value Date    WBC 6.4 03/14/2023    HGB 13.8 03/14/2023    HCT 40.2 03/14/2023    MCV 96.9 03/14/2023     03/14/2023    LYMPHOPCT 13.2 (L) 03/14/2023    RBC 4.15 03/14/2023    MCH 33.3 03/14/2023    MCHC 34.3 03/14/2023    RDW 13.4 03/14/2023    NEUTOPHILPCT 73.4 03/14/2023    MONOPCT 11.3 03/14/2023    BASOPCT 0.9 03/14/2023    NEUTROABS 4.72 03/14/2023    LYMPHSABS 0.85 (L) 03/14/2023    MONOSABS 0.73 03/14/2023    EOSABS 0.06 03/14/2023    BASOSABS 0.06 03/14/2023       Lab Results   Component Value Date     03/14/2023    K 3.9 03/14/2023    CL 96 (L) 03/14/2023    CO2 25 03/14/2023    BUN 6 03/14/2023    CREATININE 0.9 03/14/2023    GLUCOSE 117 (H) 03/14/2023    CALCIUM 9.6 03/14/2023    PROT 8.2 03/14/2023    LABALBU 4.9 03/14/2023    BILITOT 0.5 03/14/2023    ALKPHOS 113 03/14/2023     (H) 03/14/2023    ALT 52 (H) 03/14/2023    LABGLOM >60 03/14/2023    GFRAA >60 08/04/2022       Lab Results   Component Value Date    IRON 140 01/26/2021    TIBC 355 01/26/2021    FERRITIN 214 01/26/2021           Radiology-    No results found. ASSESSMENT/PLAN :    There are no diagnoses linked to this encounter.    64 yo male  Reportedly history of stage IIIB colon cancer  S/p resection and adj FOLFOX x 12 with some DR at the end  Iatrogenic neuropathy    - Will request records from Dr. Padmini Aparicio including pathology (reported 2/31 LNs+)  - He is due for CT A/P and this is ordered today  - Will need referral at next visit for colonoscopy  - CBC/CMP/CEA today  - RTC in 1 months to review results, then q 3 months with port flush q ~6 weeks    1/19/21  - Stage III B colon cancer s/p resection and 12 cycles adj FOLFOX  - CT A/P with no evidence of recurrence of metastatic disease. Diffuse hepatic steatosis was noted  - CBC and CMP was WNL with the exception of elevated LFTs, likely due to the steatosis  - CEA 7.4, will trend  - Refer to Dr. Yoselyn Quiles for colonoscopy surveillance  - PORT flush q 6 weeks  - RTC in 3 months with repeat labs    4/20/21  - Stage III B colon cancer s/p resection and 12 cycles adj FOLFOX  - CT A/P as above  - CBC and CMP was WNL with the exception of elevated LFTs (though improved from prior), likely due to the steatosis  - CEA up from 7.4 to 8.2. Still smoking 1 ppd, will work on quitting   - Referred to Dr. Yoselyn Quiles for colonoscopy surveillance  - PORT flush q 6 weeks  - RTC in 3 months with repeat labs at CT A/P    7/13/21  - Stage III B colon cancer s/p resection and 12 cycles adj FOLFOX (6/20)  - CT A/P negative for recurrence, hepatic steatosis noted  - CBC and CMP was WNL. Elevated LFTs further improved, likely due to the steatosis  - CEA down to 7.0. Still smoking 1 ppd, discussed cessation again  - Dr. Ysoelyn Quiles for colonoscopy surveillance  - PORT flush q 6 weeks  - RTC in 3 months    10/12/21  - Stage III B colon cancer s/p resection and 12 cycles adj FOLFOX (6/20)  - CT A/P due prior to follow up, ordered  - CBC and CMP was WNL. Elevated LFTs improved further  - CEA down to 6.6. Still smoking, but down to a pack q 2 days, cessation counseling provided  - Dr. Yoselyn Quiles for colonoscopy surveillance  - PORT flush q 6 weeks. Can have out at 2 years  - RTC in 3 months    1/11/22  - Stage III B colon cancer s/p resection and 12 cycles adj FOLFOX (6/20)  - CT A/P without evidence of recurrence   - CBC WNL. CMP stable  - CEA stable at 6.6.  Still smoking ~10 cigs a day. Further discussed cessation and ways to cut down and eventually quit  - Dr. Shawanda Delgado for colonoscopy surveillance  - PORT flush q 6 weeks. Can have out at 2 years  - RTC in 3 months    4/12/22  - Stage III B colon cancer s/p resection and 12 cycles adj FOLFOX (6/20)  - CT A/P without evidence of recurrence (10/21). Repeat prior to follow up  - CBC WNL. CMP stable  - CEA pending today, previously 6.6. Smoking < than 10 cigs a day. Further discussed cessation and ways to cut down and eventually quit, he continues to work on this  - Dr. Shawanda Delgado for colonoscopy surveillance  - PORT flush q 6 weeks. Can have out at 2 years  - RTC in 3 months    7/12/22  - Stage III B colon cancer s/p resection and 12 cycles adj FOLFOX (6/20)  - CT A/P (4/29/22) without evidence of recurrence  - Labs reviewed, stable  - CEA pending today, previously 5.5. Smoking < than 10 cigs a day. Further discussed cessation and ways to cut down and eventually quit, he continues to work on this  - Dr. Shawanda Delgado for colonoscopy surveillance  - PORT can come out, refer to Dr. Stiven Chi  - RTC in 4 months    11/15/22  - Stage III B colon cancer s/p resection and 12 cycles adj FOLFOX (6/20)  - CT A/P (4/29/22) without evidence of recurrence. Repeat at 1 year  - Labs reviewed, stable  - CEA pending today, previously 7.4  - Smoking < than 10 cigs a day. Discussed again cessation methods  - Dr. Shawanda Delgado for colonoscopy surveillance  - RTC in 4 months    3/14/23  - Stage III B colon cancer s/p resection and 12 cycles adj FOLFOX (6/20)  - CT A/P (4/29/22) without evidence of recurrence. Repeat ordered  - Labs reviewed, stable  - CEA pending today, previously 7.4  - Smoking < than 10 cigs a day.  Discussed again cessation methods  - Dr. Shawanda Delgado for colonoscopy surveillance  - RTC in 4 months, then 6 months    Twan Newman MD   Electronically signed 3/14/2023 at 11:06 AM

## 2023-05-11 ENCOUNTER — OFFICE VISIT (OUTPATIENT)
Dept: FAMILY MEDICINE CLINIC | Age: 63
End: 2023-05-11
Payer: MEDICARE

## 2023-05-11 VITALS
WEIGHT: 142.3 LBS | TEMPERATURE: 98.6 F | HEIGHT: 71 IN | DIASTOLIC BLOOD PRESSURE: 78 MMHG | HEART RATE: 114 BPM | BODY MASS INDEX: 19.92 KG/M2 | OXYGEN SATURATION: 98 % | SYSTOLIC BLOOD PRESSURE: 136 MMHG

## 2023-05-11 DIAGNOSIS — R73.09 ELEVATED GLUCOSE LEVEL: ICD-10-CM

## 2023-05-11 DIAGNOSIS — I10 ESSENTIAL HYPERTENSION: Primary | ICD-10-CM

## 2023-05-11 DIAGNOSIS — E78.5 HYPERLIPIDEMIA, UNSPECIFIED HYPERLIPIDEMIA TYPE: ICD-10-CM

## 2023-05-11 DIAGNOSIS — Z12.5 SCREENING PSA (PROSTATE SPECIFIC ANTIGEN): ICD-10-CM

## 2023-05-11 DIAGNOSIS — R79.89 ABNORMAL LFTS: ICD-10-CM

## 2023-05-11 DIAGNOSIS — T45.1X5A NEUROPATHY DUE TO CHEMOTHERAPEUTIC DRUG (HCC): ICD-10-CM

## 2023-05-11 DIAGNOSIS — F10.90 ALCOHOL USE DISORDER: ICD-10-CM

## 2023-05-11 DIAGNOSIS — G62.0 NEUROPATHY DUE TO CHEMOTHERAPEUTIC DRUG (HCC): ICD-10-CM

## 2023-05-11 PROCEDURE — 3078F DIAST BP <80 MM HG: CPT | Performed by: INTERNAL MEDICINE

## 2023-05-11 PROCEDURE — G8420 CALC BMI NORM PARAMETERS: HCPCS | Performed by: INTERNAL MEDICINE

## 2023-05-11 PROCEDURE — 3075F SYST BP GE 130 - 139MM HG: CPT | Performed by: INTERNAL MEDICINE

## 2023-05-11 PROCEDURE — 3017F COLORECTAL CA SCREEN DOC REV: CPT | Performed by: INTERNAL MEDICINE

## 2023-05-11 PROCEDURE — 99214 OFFICE O/P EST MOD 30 MIN: CPT | Performed by: INTERNAL MEDICINE

## 2023-05-11 PROCEDURE — G8427 DOCREV CUR MEDS BY ELIG CLIN: HCPCS | Performed by: INTERNAL MEDICINE

## 2023-05-11 PROCEDURE — 4004F PT TOBACCO SCREEN RCVD TLK: CPT | Performed by: INTERNAL MEDICINE

## 2023-05-11 RX ORDER — CLINDAMYCIN HYDROCHLORIDE 300 MG/1
CAPSULE ORAL
COMMUNITY
Start: 2023-05-10

## 2023-05-11 ASSESSMENT — ENCOUNTER SYMPTOMS
NAUSEA: 0
SORE THROAT: 0
BLOOD IN STOOL: 0
SHORTNESS OF BREATH: 0
EYE PAIN: 0
SINUS PAIN: 0
EYE DISCHARGE: 0
ABDOMINAL PAIN: 0

## 2023-05-11 NOTE — PROGRESS NOTES
Chief Complaint   Patient presents with    Hypertension     3 month follow up on hypertension, pt reports feeling pretty good. Discuss Labs     Pt forgot to get labs done for Dr. Mary Mcpherson but did have some done with Dr. Obinna Marsh on 03/14/2023        HPI:  Patient is here for follow-up    Pt feels okay    Following with Dentistfor teeth issues    Etoh- - beer >4-5 at night         Allergy and Medications are reviewed and updated. Past Medical History, Surgical History, and Family History has been reviewed and updated. Review of Systems:  Review of Systems   Constitutional:  Negative for chills and fever. HENT:  Negative for congestion, sinus pain and sore throat. Eyes:  Negative for pain and discharge. Respiratory:  Negative for shortness of breath (No new SOb). Cardiovascular:  Negative for chest pain. Gastrointestinal:  Negative for abdominal pain, blood in stool and nausea. Genitourinary:  Negative for flank pain and frequency. Musculoskeletal:  Negative for neck pain. Hematological:  Does not bruise/bleed easily. Psychiatric/Behavioral:  Negative for suicidal ideas. Vitals:    05/11/23 0956   BP: 136/78   Position: Sitting   Pulse: (!) 114   Temp: 98.6 °F (37 °C)   TempSrc: Temporal   SpO2: 98%   Weight: 142 lb 4.8 oz (64.5 kg)   Height: 5' 11\" (1.803 m)       Physical Exam  Vitals reviewed. Constitutional:       Appearance: He is well-developed. HENT:      Head: Normocephalic and atraumatic. Eyes:      Conjunctiva/sclera: Conjunctivae normal.      Pupils: Pupils are equal, round, and reactive to light. Neck:      Vascular: No JVD. Cardiovascular:      Rate and Rhythm: Normal rate and regular rhythm. Pulmonary:      Effort: Pulmonary effort is normal.      Breath sounds: Normal breath sounds. Abdominal:      General: Bowel sounds are normal.      Palpations: Abdomen is soft. Musculoskeletal:         General: Normal range of motion.    Skin:     General: Skin is warm

## 2023-06-20 DIAGNOSIS — I10 ESSENTIAL HYPERTENSION: ICD-10-CM

## 2023-06-21 RX ORDER — AMLODIPINE BESYLATE 10 MG/1
TABLET ORAL
Qty: 90 TABLET | Refills: 0 | OUTPATIENT
Start: 2023-06-21

## 2023-07-10 ENCOUNTER — HOSPITAL ENCOUNTER (OUTPATIENT)
Dept: CT IMAGING | Age: 63
Discharge: HOME | End: 2023-07-10
Attending: INTERNAL MEDICINE
Payer: MEDICARE

## 2023-07-10 DIAGNOSIS — Z12.5 SCREENING PSA (PROSTATE SPECIFIC ANTIGEN): ICD-10-CM

## 2023-07-10 DIAGNOSIS — E78.5 HYPERLIPIDEMIA, UNSPECIFIED HYPERLIPIDEMIA TYPE: ICD-10-CM

## 2023-07-10 DIAGNOSIS — R73.09 ELEVATED GLUCOSE LEVEL: ICD-10-CM

## 2023-07-10 DIAGNOSIS — I10 ESSENTIAL HYPERTENSION: ICD-10-CM

## 2023-07-10 DIAGNOSIS — C18.9 COLON CANCER METASTASIZED TO INTRA-ABDOMINAL LYMPH NODE (HCC): ICD-10-CM

## 2023-07-10 DIAGNOSIS — C77.2 COLON CANCER METASTASIZED TO INTRA-ABDOMINAL LYMPH NODE (HCC): ICD-10-CM

## 2023-07-10 LAB
ALBUMIN SERPL-MCNC: 5.3 G/DL (ref 3.5–5.2)
ALP SERPL-CCNC: 90 U/L (ref 40–129)
ALT SERPL-CCNC: 24 U/L (ref 0–40)
ANION GAP SERPL CALCULATED.3IONS-SCNC: 16 MMOL/L (ref 7–16)
AST SERPL-CCNC: 59 U/L (ref 0–39)
BASOPHILS # BLD: 0.07 E9/L (ref 0–0.2)
BASOPHILS NFR BLD: 1.6 % (ref 0–2)
BILIRUB SERPL-MCNC: 0.6 MG/DL (ref 0–1.2)
BUN SERPL-MCNC: 6 MG/DL (ref 6–23)
CALCIUM SERPL-MCNC: 10.6 MG/DL (ref 8.6–10.2)
CEA SERPL-MCNC: 8.8 NG/ML (ref 0–5.2)
CHLORIDE SERPL-SCNC: 96 MMOL/L (ref 98–107)
CHOLESTEROL, FASTING: 319 MG/DL (ref 0–199)
CO2 SERPL-SCNC: 25 MMOL/L (ref 22–29)
CREAT SERPL-MCNC: 0.9 MG/DL (ref 0.7–1.2)
EOSINOPHIL # BLD: 0.05 E9/L (ref 0.05–0.5)
EOSINOPHIL NFR BLD: 1.1 % (ref 0–6)
ERYTHROCYTE [DISTWIDTH] IN BLOOD BY AUTOMATED COUNT: 13.5 FL (ref 11.5–15)
GLUCOSE SERPL-MCNC: 88 MG/DL (ref 74–99)
HBA1C MFR BLD: 5 % (ref 4–5.6)
HCT VFR BLD AUTO: 42.1 % (ref 37–54)
HDLC SERPL-MCNC: 233 MG/DL
HGB BLD-MCNC: 14.4 G/DL (ref 12.5–16.5)
IMM GRANULOCYTES # BLD: 0.02 E9/L
IMM GRANULOCYTES NFR BLD: 0.5 % (ref 0–5)
LDL CHOLESTEROL CALCULATED: 76 MG/DL (ref 0–99)
LYMPHOCYTES # BLD: 0.94 E9/L (ref 1.5–4)
LYMPHOCYTES NFR BLD: 21.4 % (ref 20–42)
MCH RBC QN AUTO: 32.7 PG (ref 26–35)
MCHC RBC AUTO-ENTMCNC: 34.2 % (ref 32–34.5)
MCV RBC AUTO: 95.7 FL (ref 80–99.9)
MONOCYTES # BLD: 0.49 E9/L (ref 0.1–0.95)
MONOCYTES NFR BLD: 11.2 % (ref 2–12)
NEUTROPHILS # BLD: 2.82 E9/L (ref 1.8–7.3)
NEUTS SEG NFR BLD: 64.2 % (ref 43–80)
PLATELET # BLD AUTO: 289 E9/L (ref 130–450)
PMV BLD AUTO: 8.8 FL (ref 7–12)
POTASSIUM SERPL-SCNC: 4.4 MMOL/L (ref 3.5–5)
PROT SERPL-MCNC: 8.9 G/DL (ref 6.4–8.3)
PSA SERPL-MCNC: 2.86 NG/ML (ref 0–4)
RBC # BLD AUTO: 4.4 E12/L (ref 3.8–5.8)
SODIUM SERPL-SCNC: 137 MMOL/L (ref 132–146)
TRIGLYCERIDE, FASTING: 52 MG/DL (ref 0–149)
TSH SERPL-MCNC: 0.71 UIU/ML (ref 0.27–4.2)
VLDLC SERPL CALC-MCNC: 10 MG/DL
WBC # BLD: 4.4 E9/L (ref 4.5–11.5)

## 2023-07-10 PROCEDURE — 80053 COMPREHEN METABOLIC PANEL: CPT

## 2023-07-10 PROCEDURE — 6360000004 HC RX CONTRAST MEDICATION: Performed by: RADIOLOGY

## 2023-07-10 PROCEDURE — 82378 CARCINOEMBRYONIC ANTIGEN: CPT

## 2023-07-10 PROCEDURE — 74177 CT ABD & PELVIS W/CONTRAST: CPT

## 2023-07-10 PROCEDURE — 80061 LIPID PANEL: CPT

## 2023-07-10 PROCEDURE — 83036 HEMOGLOBIN GLYCOSYLATED A1C: CPT

## 2023-07-10 PROCEDURE — G0103 PSA SCREENING: HCPCS

## 2023-07-10 PROCEDURE — 85025 COMPLETE CBC W/AUTO DIFF WBC: CPT

## 2023-07-10 PROCEDURE — 36415 COLL VENOUS BLD VENIPUNCTURE: CPT

## 2023-07-10 PROCEDURE — 84443 ASSAY THYROID STIM HORMONE: CPT

## 2023-07-10 RX ADMIN — IOPAMIDOL 18 ML: 755 INJECTION, SOLUTION INTRAVENOUS at 10:37

## 2023-07-10 RX ADMIN — IOPAMIDOL 75 ML: 755 INJECTION, SOLUTION INTRAVENOUS at 10:37

## 2023-07-11 ENCOUNTER — TELEPHONE (OUTPATIENT)
Dept: FAMILY MEDICINE CLINIC | Age: 63
End: 2023-07-11

## 2023-07-11 ENCOUNTER — OFFICE VISIT (OUTPATIENT)
Dept: FAMILY MEDICINE CLINIC | Age: 63
End: 2023-07-11
Payer: MEDICARE

## 2023-07-11 VITALS
HEIGHT: 71 IN | DIASTOLIC BLOOD PRESSURE: 80 MMHG | OXYGEN SATURATION: 95 % | BODY MASS INDEX: 18.9 KG/M2 | TEMPERATURE: 97.5 F | SYSTOLIC BLOOD PRESSURE: 128 MMHG | HEART RATE: 73 BPM | WEIGHT: 135 LBS

## 2023-07-11 DIAGNOSIS — C77.2 COLON CANCER METASTASIZED TO INTRA-ABDOMINAL LYMPH NODE (HCC): ICD-10-CM

## 2023-07-11 DIAGNOSIS — T45.1X5A NEUROPATHY DUE TO CHEMOTHERAPEUTIC DRUG (HCC): ICD-10-CM

## 2023-07-11 DIAGNOSIS — C18.9 COLON CANCER METASTASIZED TO INTRA-ABDOMINAL LYMPH NODE (HCC): ICD-10-CM

## 2023-07-11 DIAGNOSIS — F10.90 ALCOHOL USE DISORDER: ICD-10-CM

## 2023-07-11 DIAGNOSIS — R79.89 ABNORMAL LFTS: ICD-10-CM

## 2023-07-11 DIAGNOSIS — I10 ESSENTIAL HYPERTENSION: Primary | ICD-10-CM

## 2023-07-11 DIAGNOSIS — G62.0 NEUROPATHY DUE TO CHEMOTHERAPEUTIC DRUG (HCC): ICD-10-CM

## 2023-07-11 DIAGNOSIS — E78.5 HYPERLIPIDEMIA, UNSPECIFIED HYPERLIPIDEMIA TYPE: ICD-10-CM

## 2023-07-11 PROCEDURE — 3074F SYST BP LT 130 MM HG: CPT | Performed by: INTERNAL MEDICINE

## 2023-07-11 PROCEDURE — 3017F COLORECTAL CA SCREEN DOC REV: CPT | Performed by: INTERNAL MEDICINE

## 2023-07-11 PROCEDURE — G8427 DOCREV CUR MEDS BY ELIG CLIN: HCPCS | Performed by: INTERNAL MEDICINE

## 2023-07-11 PROCEDURE — G8420 CALC BMI NORM PARAMETERS: HCPCS | Performed by: INTERNAL MEDICINE

## 2023-07-11 PROCEDURE — 3079F DIAST BP 80-89 MM HG: CPT | Performed by: INTERNAL MEDICINE

## 2023-07-11 PROCEDURE — 99214 OFFICE O/P EST MOD 30 MIN: CPT | Performed by: INTERNAL MEDICINE

## 2023-07-11 PROCEDURE — 4004F PT TOBACCO SCREEN RCVD TLK: CPT | Performed by: INTERNAL MEDICINE

## 2023-07-11 RX ORDER — AMLODIPINE BESYLATE 10 MG/1
10 TABLET ORAL DAILY
Qty: 90 TABLET | Refills: 0 | Status: SHIPPED | OUTPATIENT
Start: 2023-07-11 | End: 2023-10-09

## 2023-07-11 RX ORDER — IBUPROFEN 800 MG/1
TABLET ORAL
COMMUNITY
Start: 2023-06-20 | End: 2023-07-11

## 2023-07-11 RX ORDER — CHLORHEXIDINE GLUCONATE 0.12 MG/ML
RINSE ORAL
COMMUNITY
Start: 2023-07-01

## 2023-07-11 ASSESSMENT — ENCOUNTER SYMPTOMS
NAUSEA: 0
EYE DISCHARGE: 0
BLOOD IN STOOL: 0
SHORTNESS OF BREATH: 0
ABDOMINAL PAIN: 0

## 2023-07-11 NOTE — ASSESSMENT & PLAN NOTE
Uncontrolled, lifestyle modifications recommended     Decrease Etoh use    Low chol , diet, exercises    Recheck in 3 months

## 2023-07-11 NOTE — ASSESSMENT & PLAN NOTE
Counseled about his alcohol use and advised to decrease his alcohol use    Adv to f/u with GI for his elevated LFTs  Will request further records from his office

## 2023-07-11 NOTE — TELEPHONE ENCOUNTER
Spoke to jessenia at Dr. Christian People office patient was last seen in Feb 2021 he did have a follow up in May but he cancelled.  Nunu Cha will reach out to the patient and reschedule him and let us know

## 2023-07-11 NOTE — TELEPHONE ENCOUNTER
Called Dr. Lou Burks office to follow up on killian per Dr. Miranda Silva. Pt was last seen in 2021 and report states LFT GGT in 3 months. Need to see when his last appt was and follow up plan.  LVM asking them to please return my call

## 2023-07-11 NOTE — ASSESSMENT & PLAN NOTE
Pt reports that MORGAN - he was taking 300 mg a day and is not helping.  He vasu weaning it down to 100 mg qd  Pt will discuss with heme Onc

## 2023-07-18 ENCOUNTER — OFFICE VISIT (OUTPATIENT)
Dept: ONCOLOGY | Age: 63
End: 2023-07-18
Payer: MEDICARE

## 2023-07-18 VITALS
HEART RATE: 106 BPM | WEIGHT: 136 LBS | HEIGHT: 71 IN | TEMPERATURE: 97.9 F | DIASTOLIC BLOOD PRESSURE: 83 MMHG | OXYGEN SATURATION: 100 % | BODY MASS INDEX: 19.04 KG/M2 | SYSTOLIC BLOOD PRESSURE: 131 MMHG

## 2023-07-18 DIAGNOSIS — C77.2 COLON CANCER METASTASIZED TO INTRA-ABDOMINAL LYMPH NODE (HCC): Primary | ICD-10-CM

## 2023-07-18 DIAGNOSIS — C18.9 COLON CANCER METASTASIZED TO INTRA-ABDOMINAL LYMPH NODE (HCC): Primary | ICD-10-CM

## 2023-07-18 PROCEDURE — 99212 OFFICE O/P EST SF 10 MIN: CPT

## 2023-07-18 NOTE — PROGRESS NOTES
[address]  [facility], [facility]   Hematology/Oncology  Consult      Patient Name: Nerissa Guevara  YOB: 1960  PCP: Ginny Vides MD   Referring Provider:      Reason for Consultation:   Chief Complaint   Patient presents with    Colon Cancer    Follow-up        Subjective:  Feels well today, no acute complaints or issues in interim. BMs unchanged, no bleeding, no weight loss. Neuropathy stable on 1 gabapentin dose per day. Still smoking ~1/2 ppd. Had 10 teeth removed in interim    History of Present Illness: This pt is a pleasant 64 yo male who was diagnosed with colon cancer in 11/19 (reportedly stage IIIB), s/p resection and 2/31 LNs positive, and reportedly received adj chemotherapy and has chronic neuropathy from this. No records are available for review today, however he states he received 8 cycles of full dose FOLFOX and 4 cycles of DR FOLFOX. He still has his PORT. He has no acute complaints today, including abdominal pain, change in bowel habits, pain with defecation, tenesmus, hematochezia or melena. He has maintained a stable weight.  He overall feels well    Diagnostic Data:     Past Medical History:   Diagnosis Date    Cataract     bilateral    Colon cancer ([address])     Hypertension     Neuropathy     finger/feet from chemotherapy       Patient Active Problem List    Diagnosis Date Noted    Hyperlipidemia 07/11/2023    Alcohol use disorder 07/11/2023    Abnormal LFTs 03/25/2021    Alcohol use 03/25/2021    Colon cancer metastasized to intra-abdominal lymph node ([address]) 12/17/2020    Neuropathy due to chemotherapeutic drug ([address]) 12/17/2020    Essential hypertension 12/17/2020    Smoker 12/17/2020        Past Surgical History:   Procedure Laterality Date    CATHETER REMOVAL Left 8/11/2022    MEDIPORT CATHETER REMOVAL performed by Betty Perez MD at [address] [place] [address]  2019    COLONOSCOPY      EYE SURGERY Bilateral     MEDIPORT INSERTION, SINGLE

## 2023-11-13 ENCOUNTER — OFFICE VISIT (OUTPATIENT)
Dept: FAMILY MEDICINE CLINIC | Age: 63
End: 2023-11-13
Payer: MEDICARE

## 2023-11-13 VITALS
HEIGHT: 71 IN | OXYGEN SATURATION: 99 % | HEART RATE: 106 BPM | WEIGHT: 145.2 LBS | TEMPERATURE: 98 F | DIASTOLIC BLOOD PRESSURE: 78 MMHG | BODY MASS INDEX: 20.33 KG/M2 | SYSTOLIC BLOOD PRESSURE: 132 MMHG

## 2023-11-13 DIAGNOSIS — I10 ESSENTIAL HYPERTENSION: ICD-10-CM

## 2023-11-13 DIAGNOSIS — L40.9 PSORIASIS: ICD-10-CM

## 2023-11-13 DIAGNOSIS — E78.5 HYPERLIPIDEMIA, UNSPECIFIED HYPERLIPIDEMIA TYPE: ICD-10-CM

## 2023-11-13 DIAGNOSIS — R79.89 ABNORMAL LFTS: ICD-10-CM

## 2023-11-13 DIAGNOSIS — Z00.00 INITIAL MEDICARE ANNUAL WELLNESS VISIT: Primary | ICD-10-CM

## 2023-11-13 PROCEDURE — G0438 PPPS, INITIAL VISIT: HCPCS | Performed by: INTERNAL MEDICINE

## 2023-11-13 PROCEDURE — 3078F DIAST BP <80 MM HG: CPT | Performed by: INTERNAL MEDICINE

## 2023-11-13 PROCEDURE — 3075F SYST BP GE 130 - 139MM HG: CPT | Performed by: INTERNAL MEDICINE

## 2023-11-13 PROCEDURE — 3017F COLORECTAL CA SCREEN DOC REV: CPT | Performed by: INTERNAL MEDICINE

## 2023-11-13 PROCEDURE — G8484 FLU IMMUNIZE NO ADMIN: HCPCS | Performed by: INTERNAL MEDICINE

## 2023-11-13 RX ORDER — MOMETASONE FUROATE 1 MG/G
CREAM TOPICAL
Qty: 50 G | Refills: 1 | Status: SHIPPED | OUTPATIENT
Start: 2023-11-13

## 2023-11-13 RX ORDER — AMLODIPINE BESYLATE 10 MG/1
10 TABLET ORAL DAILY
Qty: 90 TABLET | Refills: 0 | Status: SHIPPED | OUTPATIENT
Start: 2023-11-13 | End: 2024-02-11

## 2023-11-13 ASSESSMENT — LIFESTYLE VARIABLES
HOW OFTEN DURING THE LAST YEAR HAVE YOU HAD A FEELING OF GUILT OR REMORSE AFTER DRINKING: 0
HOW OFTEN DURING THE LAST YEAR HAVE YOU NEEDED AN ALCOHOLIC DRINK FIRST THING IN THE MORNING TO GET YOURSELF GOING AFTER A NIGHT OF HEAVY DRINKING: 0
HOW OFTEN DURING THE LAST YEAR HAVE YOU FOUND THAT YOU WERE NOT ABLE TO STOP DRINKING ONCE YOU HAD STARTED: 0
HOW MANY STANDARD DRINKS CONTAINING ALCOHOL DO YOU HAVE ON A TYPICAL DAY: 1 OR 2
HAS A RELATIVE, FRIEND, DOCTOR, OR ANOTHER HEALTH PROFESSIONAL EXPRESSED CONCERN ABOUT YOUR DRINKING OR SUGGESTED YOU CUT DOWN: 0
HAVE YOU OR SOMEONE ELSE BEEN INJURED AS A RESULT OF YOUR DRINKING: 0
HOW OFTEN DURING THE LAST YEAR HAVE YOU BEEN UNABLE TO REMEMBER WHAT HAPPENED THE NIGHT BEFORE BECAUSE YOU HAD BEEN DRINKING: 0
HOW OFTEN DO YOU HAVE A DRINK CONTAINING ALCOHOL: 4 OR MORE TIMES A WEEK
HOW OFTEN DURING THE LAST YEAR HAVE YOU FAILED TO DO WHAT WAS NORMALLY EXPECTED FROM YOU BECAUSE OF DRINKING: 0

## 2023-11-13 ASSESSMENT — PATIENT HEALTH QUESTIONNAIRE - PHQ9
SUM OF ALL RESPONSES TO PHQ QUESTIONS 1-9: 0
1. LITTLE INTEREST OR PLEASURE IN DOING THINGS: 0
2. FEELING DOWN, DEPRESSED OR HOPELESS: 0
SUM OF ALL RESPONSES TO PHQ QUESTIONS 1-9: 0
SUM OF ALL RESPONSES TO PHQ9 QUESTIONS 1 & 2: 0

## 2024-01-19 ENCOUNTER — HOSPITAL ENCOUNTER (OUTPATIENT)
Dept: INFUSION THERAPY | Age: 64
Discharge: HOME | End: 2024-01-19
Payer: MEDICARE

## 2024-01-19 DIAGNOSIS — C18.9 COLON CANCER METASTASIZED TO INTRA-ABDOMINAL LYMPH NODE (HCC): ICD-10-CM

## 2024-01-19 DIAGNOSIS — C77.2 COLON CANCER METASTASIZED TO INTRA-ABDOMINAL LYMPH NODE (HCC): ICD-10-CM

## 2024-01-19 LAB
ALBUMIN SERPL-MCNC: 4.9 G/DL (ref 3.5–5.2)
ALP SERPL-CCNC: 112 U/L (ref 40–129)
ALT SERPL-CCNC: 27 U/L (ref 0–40)
ANION GAP SERPL CALCULATED.3IONS-SCNC: 16 MMOL/L (ref 7–16)
AST SERPL-CCNC: 69 U/L (ref 0–39)
BASOPHILS # BLD: 0.07 K/UL (ref 0–0.2)
BASOPHILS NFR BLD: 1 % (ref 0–2)
BILIRUB SERPL-MCNC: 0.5 MG/DL (ref 0–1.2)
BUN SERPL-MCNC: 9 MG/DL (ref 6–23)
CALCIUM SERPL-MCNC: 9.5 MG/DL (ref 8.6–10.2)
CEA SERPL-MCNC: 9.3 NG/ML (ref 0–5.2)
CHLORIDE SERPL-SCNC: 96 MMOL/L (ref 98–107)
CO2 SERPL-SCNC: 22 MMOL/L (ref 22–29)
CREAT SERPL-MCNC: 1 MG/DL (ref 0.7–1.2)
EOSINOPHIL # BLD: 0.03 K/UL (ref 0.05–0.5)
EOSINOPHILS RELATIVE PERCENT: 1 % (ref 0–6)
ERYTHROCYTE [DISTWIDTH] IN BLOOD BY AUTOMATED COUNT: 13.2 % (ref 11.5–15)
GFR SERPL CREATININE-BSD FRML MDRD: >60 ML/MIN/1.73M2
GLUCOSE SERPL-MCNC: 99 MG/DL (ref 74–99)
HCT VFR BLD AUTO: 39.7 % (ref 37–54)
HGB BLD-MCNC: 13.5 G/DL (ref 12.5–16.5)
IMM GRANULOCYTES # BLD AUTO: 0.04 K/UL (ref 0–0.58)
IMM GRANULOCYTES NFR BLD: 1 % (ref 0–5)
LYMPHOCYTES NFR BLD: 0.97 K/UL (ref 1.5–4)
LYMPHOCYTES RELATIVE PERCENT: 16 % (ref 20–42)
MCH RBC QN AUTO: 33.3 PG (ref 26–35)
MCHC RBC AUTO-ENTMCNC: 34 G/DL (ref 32–34.5)
MCV RBC AUTO: 97.8 FL (ref 80–99.9)
MONOCYTES NFR BLD: 0.49 K/UL (ref 0.1–0.95)
MONOCYTES NFR BLD: 8 % (ref 2–12)
NEUTROPHILS NFR BLD: 74 % (ref 43–80)
NEUTS SEG NFR BLD: 4.49 K/UL (ref 1.8–7.3)
PLATELET # BLD AUTO: 222 K/UL (ref 130–450)
PMV BLD AUTO: 8.8 FL (ref 7–12)
POTASSIUM SERPL-SCNC: 4.4 MMOL/L (ref 3.5–5)
PROT SERPL-MCNC: 8.3 G/DL (ref 6.4–8.3)
RBC # BLD AUTO: 4.06 M/UL (ref 3.8–5.8)
SODIUM SERPL-SCNC: 134 MMOL/L (ref 132–146)
WBC OTHER # BLD: 6.1 K/UL (ref 4.5–11.5)

## 2024-01-19 PROCEDURE — 82378 CARCINOEMBRYONIC ANTIGEN: CPT

## 2024-01-19 PROCEDURE — 36415 COLL VENOUS BLD VENIPUNCTURE: CPT

## 2024-01-19 PROCEDURE — 80053 COMPREHEN METABOLIC PANEL: CPT

## 2024-01-19 PROCEDURE — 85025 COMPLETE CBC W/AUTO DIFF WBC: CPT

## 2024-01-23 ENCOUNTER — OFFICE VISIT (OUTPATIENT)
Dept: ONCOLOGY | Age: 64
End: 2024-01-23
Payer: MEDICARE

## 2024-01-23 VITALS
OXYGEN SATURATION: 99 % | SYSTOLIC BLOOD PRESSURE: 145 MMHG | BODY MASS INDEX: 20.75 KG/M2 | HEIGHT: 71 IN | TEMPERATURE: 97.2 F | HEART RATE: 89 BPM | WEIGHT: 148.2 LBS | DIASTOLIC BLOOD PRESSURE: 82 MMHG

## 2024-01-23 DIAGNOSIS — C77.2 COLON CANCER METASTASIZED TO INTRA-ABDOMINAL LYMPH NODE (HCC): Primary | ICD-10-CM

## 2024-01-23 DIAGNOSIS — C18.9 COLON CANCER METASTASIZED TO INTRA-ABDOMINAL LYMPH NODE (HCC): Primary | ICD-10-CM

## 2024-01-23 PROCEDURE — 99213 OFFICE O/P EST LOW 20 MIN: CPT

## 2024-01-23 NOTE — PROGRESS NOTES
[facility]  [address] [place] [address]   Hematology/Oncology  Consult      Patient Name: Douglas Guerra  YOB: 1960  PCP: Jay Noriega MD   Referring Provider:      Reason for Consultation:   Chief Complaint   Patient presents with    Cancer    Discuss Labs    Follow-up     Colon cancer        Subjective:  Feels well today, no acute complaints or issues in interim. BMs unchanged, no bleeding, no weight loss. Neuropathy stable on 1 gabapentin dose per day. Still smoking, thinks it may be less than 1/2 ppd now.     History of Present Illness:  This pt is a pleasant 61 yo male who was diagnosed with colon cancer in 11/19 (reportedly stage IIIB), s/p resection and 2/31 LNs positive, and reportedly received adj chemotherapy and has chronic neuropathy from this. No records are available for review today, however he states he received 8 cycles of full dose FOLFOX and 4 cycles of DR FOLFOX. He still has his PORT. He has no acute complaints today, including abdominal pain, change in bowel habits, pain with defecation, tenesmus, hematochezia or melena. He has maintained a stable weight. He overall feels well    Diagnostic Data:     Past Medical History:   Diagnosis Date    Cataract     bilateral    Colon cancer (HCC)     Hypertension     Neuropathy     finger/feet from chemotherapy       Patient Active Problem List    Diagnosis Date Noted    Hyperlipidemia 07/11/2023    Alcohol use disorder 07/11/2023    Abnormal LFTs 03/25/2021    Alcohol use 03/25/2021    Colon cancer metastasized to intra-abdominal lymph node (HCC) 12/17/2020    Neuropathy due to chemotherapeutic drug (HCC) 12/17/2020    Essential hypertension 12/17/2020    Smoker 12/17/2020        Past Surgical History:   Procedure Laterality Date    CATHETER REMOVAL Left 8/11/2022    MEDIPORT CATHETER REMOVAL performed by Isabel Costa MD at [facility] OR    COLON SURGERY  2019    COLONOSCOPY      EYE SURGERY Bilateral

## 2024-02-15 ENCOUNTER — OFFICE VISIT (OUTPATIENT)
Dept: FAMILY MEDICINE CLINIC | Age: 64
End: 2024-02-15
Payer: MEDICARE

## 2024-02-15 VITALS
BODY MASS INDEX: 20.24 KG/M2 | TEMPERATURE: 97.8 F | WEIGHT: 144.6 LBS | OXYGEN SATURATION: 99 % | DIASTOLIC BLOOD PRESSURE: 66 MMHG | SYSTOLIC BLOOD PRESSURE: 102 MMHG | HEIGHT: 71 IN | HEART RATE: 102 BPM

## 2024-02-15 DIAGNOSIS — F10.90 ALCOHOL USE DISORDER: ICD-10-CM

## 2024-02-15 DIAGNOSIS — C77.2 COLON CANCER METASTASIZED TO INTRA-ABDOMINAL LYMPH NODE (HCC): ICD-10-CM

## 2024-02-15 DIAGNOSIS — E78.5 HYPERLIPIDEMIA, UNSPECIFIED HYPERLIPIDEMIA TYPE: ICD-10-CM

## 2024-02-15 DIAGNOSIS — C18.9 COLON CANCER METASTASIZED TO INTRA-ABDOMINAL LYMPH NODE (HCC): ICD-10-CM

## 2024-02-15 DIAGNOSIS — F17.200 SMOKER: ICD-10-CM

## 2024-02-15 DIAGNOSIS — I10 ESSENTIAL HYPERTENSION: Primary | ICD-10-CM

## 2024-02-15 PROCEDURE — 99214 OFFICE O/P EST MOD 30 MIN: CPT | Performed by: INTERNAL MEDICINE

## 2024-02-15 PROCEDURE — 3017F COLORECTAL CA SCREEN DOC REV: CPT | Performed by: INTERNAL MEDICINE

## 2024-02-15 PROCEDURE — G8420 CALC BMI NORM PARAMETERS: HCPCS | Performed by: INTERNAL MEDICINE

## 2024-02-15 PROCEDURE — G8484 FLU IMMUNIZE NO ADMIN: HCPCS | Performed by: INTERNAL MEDICINE

## 2024-02-15 PROCEDURE — 3074F SYST BP LT 130 MM HG: CPT | Performed by: INTERNAL MEDICINE

## 2024-02-15 PROCEDURE — G8427 DOCREV CUR MEDS BY ELIG CLIN: HCPCS | Performed by: INTERNAL MEDICINE

## 2024-02-15 PROCEDURE — 3078F DIAST BP <80 MM HG: CPT | Performed by: INTERNAL MEDICINE

## 2024-02-15 PROCEDURE — 4004F PT TOBACCO SCREEN RCVD TLK: CPT | Performed by: INTERNAL MEDICINE

## 2024-02-15 RX ORDER — AMLODIPINE BESYLATE 10 MG/1
10 TABLET ORAL DAILY
Qty: 90 TABLET | Refills: 0 | Status: SHIPPED | OUTPATIENT
Start: 2024-02-15 | End: 2024-05-15

## 2024-02-15 NOTE — PROGRESS NOTES
Chief Complaint   Patient presents with    Hypertension     Pt here for follow up on hypertension, pt reports feeling good.  No concerns or issues     Discuss Labs     Completed on  01/19/2024     Medication Refill    Health Maintenance     AWV - pt will schedule next time, Flu , pneumonia vaccine - pt declined         HPI:  Patient is here for follow-up     Feeling okay    1/2 PPD  Beer at least 2/night    Last colonoscopy 2/2021-     Allergy and Medications are reviewed and updated.  Past Medical History, Surgical History, and Family History has been reviewed and updated.    Review of Systems:  Review of Systems   Constitutional:  Negative for chills and fever.   HENT:  Negative for congestion, sinus pain and sore throat.    Eyes:  Negative for pain and discharge.   Respiratory:  Negative for shortness of breath (No new SOb).    Cardiovascular:  Negative for chest pain.   Gastrointestinal:  Negative for abdominal pain, blood in stool and nausea.   Genitourinary:  Negative for flank pain and frequency.   Musculoskeletal:  Negative for neck pain.   Hematological:  Does not bruise/bleed easily.   Psychiatric/Behavioral:  Negative for suicidal ideas.          Vitals:    02/15/24 1000 02/15/24 1005   BP: 102/66    Position: Sitting    Pulse: (!) 48 (!) 102   Temp: 97.8 °F (36.6 °C)    TempSrc: Temporal    SpO2: 96% 99%   Weight: 65.6 kg (144 lb 9.6 oz)    Height: 1.803 m (5' 11\")        Physical Exam  Vitals reviewed.   Constitutional:       Appearance: He is well-developed.   HENT:      Head: Normocephalic and atraumatic.   Eyes:      Conjunctiva/sclera: Conjunctivae normal.      Pupils: Pupils are equal, round, and reactive to light.   Neck:      Vascular: No JVD.   Cardiovascular:      Rate and Rhythm: Normal rate and regular rhythm.   Pulmonary:      Effort: Pulmonary effort is normal.      Breath sounds: Normal breath sounds.   Abdominal:      General: Bowel sounds are normal.      Palpations: Abdomen is soft.

## 2024-04-21 ENCOUNTER — HOSPITAL ENCOUNTER (EMERGENCY)
Age: 64
Discharge: HOME | End: 2024-04-21
Attending: EMERGENCY MEDICINE
Payer: MEDICARE

## 2024-04-21 ENCOUNTER — APPOINTMENT (OUTPATIENT)
Dept: GENERAL RADIOLOGY | Age: 64
End: 2024-04-21
Payer: MEDICARE

## 2024-04-21 ENCOUNTER — APPOINTMENT (OUTPATIENT)
Dept: CT IMAGING | Age: 64
End: 2024-04-21
Payer: MEDICARE

## 2024-04-21 VITALS
HEART RATE: 83 BPM | OXYGEN SATURATION: 98 % | WEIGHT: 140 LBS | RESPIRATION RATE: 16 BRPM | DIASTOLIC BLOOD PRESSURE: 72 MMHG | TEMPERATURE: 97.5 F | SYSTOLIC BLOOD PRESSURE: 115 MMHG | BODY MASS INDEX: 19.53 KG/M2

## 2024-04-21 DIAGNOSIS — F10.920 ACUTE ALCOHOLIC INTOXICATION WITHOUT COMPLICATION (HCC): Primary | ICD-10-CM

## 2024-04-21 DIAGNOSIS — S09.90XA INJURY OF HEAD, INITIAL ENCOUNTER: ICD-10-CM

## 2024-04-21 DIAGNOSIS — S01.01XA LACERATION OF SCALP, INITIAL ENCOUNTER: ICD-10-CM
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
## 2024-04-21 LAB
ALBUMIN SERPL-MCNC: 4.3 G/DL (ref 3.5–5.2)
ALP SERPL-CCNC: 166 U/L (ref 40–129)
ALT SERPL-CCNC: 79 U/L (ref 0–40)
ANION GAP SERPL CALCULATED.3IONS-SCNC: 16 MMOL/L (ref 7–16)
AST SERPL-CCNC: 193 U/L (ref 0–39)
BASOPHILS # BLD: 0.05 K/UL (ref 0–0.2)
BASOPHILS NFR BLD: 1 % (ref 0–2)
BILIRUB DIRECT SERPL-MCNC: <0.2 MG/DL (ref 0–0.3)
BILIRUB INDIRECT SERPL-MCNC: ABNORMAL MG/DL (ref 0–1)
BILIRUB SERPL-MCNC: 0.4 MG/DL (ref 0–1.2)
BILIRUB UR QL STRIP: NEGATIVE
BUN SERPL-MCNC: 7 MG/DL (ref 6–23)
CALCIUM SERPL-MCNC: 8.8 MG/DL (ref 8.6–10.2)
CHLORIDE SERPL-SCNC: 95 MMOL/L (ref 98–107)
CLARITY UR: CLEAR
CO2 SERPL-SCNC: 21 MMOL/L (ref 22–29)
COLOR UR: YELLOW
CREAT SERPL-MCNC: 1 MG/DL (ref 0.7–1.2)
EOSINOPHIL # BLD: 0.11 K/UL (ref 0.05–0.5)
EOSINOPHILS RELATIVE PERCENT: 2 % (ref 0–6)
ERYTHROCYTE [DISTWIDTH] IN BLOOD BY AUTOMATED COUNT: 13 % (ref 11.5–15)
ETHANOLAMINE SERPL-MCNC: 370 MG/DL
GFR SERPL CREATININE-BSD FRML MDRD: 88 ML/MIN/1.73M2
GLUCOSE SERPL-MCNC: 98 MG/DL (ref 74–99)
GLUCOSE UR STRIP-MCNC: NEGATIVE MG/DL
HCT VFR BLD AUTO: 34.1 % (ref 37–54)
HGB BLD-MCNC: 12.2 G/DL (ref 12.5–16.5)
HGB UR QL STRIP.AUTO: NEGATIVE
IMM GRANULOCYTES # BLD AUTO: 0.08 K/UL (ref 0–0.58)
IMM GRANULOCYTES NFR BLD: 1 % (ref 0–5)
KETONES UR STRIP-MCNC: NEGATIVE MG/DL
LEUKOCYTE ESTERASE UR QL STRIP: NEGATIVE
LYMPHOCYTES NFR BLD: 1.32 K/UL (ref 1.5–4)
LYMPHOCYTES RELATIVE PERCENT: 22 % (ref 20–42)
MAGNESIUM SERPL-MCNC: 2 MG/DL (ref 1.6–2.6)
MCH RBC QN AUTO: 34.2 PG (ref 26–35)
MCHC RBC AUTO-ENTMCNC: 35.8 G/DL (ref 32–34.5)
MCV RBC AUTO: 95.5 FL (ref 80–99.9)
MONOCYTES NFR BLD: 0.81 K/UL (ref 0.1–0.95)
MONOCYTES NFR BLD: 14 % (ref 2–12)
NEUTROPHILS NFR BLD: 60 % (ref 43–80)
NEUTS SEG NFR BLD: 3.6 K/UL (ref 1.8–7.3)
NITRITE UR QL STRIP: NEGATIVE
PH UR STRIP: 6 [PH] (ref 5–9)
PLATELET # BLD AUTO: 153 K/UL (ref 130–450)
PMV BLD AUTO: 9.2 FL (ref 7–12)
POTASSIUM SERPL-SCNC: 3 MMOL/L (ref 3.5–5)
PROT SERPL-MCNC: 7.6 G/DL (ref 6.4–8.3)
PROT UR STRIP-MCNC: NEGATIVE MG/DL
RBC # BLD AUTO: 3.57 M/UL (ref 3.8–5.8)
RBC #/AREA URNS HPF: ABNORMAL /HPF
SODIUM SERPL-SCNC: 132 MMOL/L (ref 132–146)
SP GR UR STRIP: <1.005 (ref 1–1.03)
TROPONIN I SERPL HS-MCNC: 9 NG/L (ref 0–11)
TROPONIN I SERPL HS-MCNC: 9 NG/L (ref 0–11)
UROBILINOGEN UR STRIP-ACNC: 0.2 EU/DL (ref 0–1)
WBC #/AREA URNS HPF: ABNORMAL /HPF
WBC OTHER # BLD: 6 K/UL (ref 4.5–11.5)

## 2024-04-21 PROCEDURE — 81001 URINALYSIS AUTO W/SCOPE: CPT

## 2024-04-21 PROCEDURE — 82248 BILIRUBIN DIRECT: CPT

## 2024-04-21 PROCEDURE — 83735 ASSAY OF MAGNESIUM: CPT

## 2024-04-21 PROCEDURE — 93005 ELECTROCARDIOGRAM TRACING: CPT

## 2024-04-21 PROCEDURE — 74177 CT ABD & PELVIS W/CONTRAST: CPT

## 2024-04-21 PROCEDURE — 96366 THER/PROPH/DIAG IV INF ADDON: CPT

## 2024-04-21 PROCEDURE — 6360000002 HC RX W HCPCS

## 2024-04-21 PROCEDURE — 96365 THER/PROPH/DIAG IV INF INIT: CPT

## 2024-04-21 PROCEDURE — 96361 HYDRATE IV INFUSION ADD-ON: CPT

## 2024-04-21 PROCEDURE — 84484 ASSAY OF TROPONIN QUANT: CPT

## 2024-04-21 PROCEDURE — 6360000004 HC RX CONTRAST MEDICATION: Performed by: RADIOLOGY

## 2024-04-21 PROCEDURE — 99285 EMERGENCY DEPT VISIT HI MDM: CPT

## 2024-04-21 PROCEDURE — 2580000003 HC RX 258

## 2024-04-21 PROCEDURE — 96375 TX/PRO/DX INJ NEW DRUG ADDON: CPT

## 2024-04-21 PROCEDURE — 85025 COMPLETE CBC W/AUTO DIFF WBC: CPT

## 2024-04-21 PROCEDURE — 72125 CT NECK SPINE W/O DYE: CPT

## 2024-04-21 PROCEDURE — 70450 CT HEAD/BRAIN W/O DYE: CPT

## 2024-04-21 PROCEDURE — 71045 X-RAY EXAM CHEST 1 VIEW: CPT

## 2024-04-21 PROCEDURE — 80053 COMPREHEN METABOLIC PANEL: CPT

## 2024-04-21 PROCEDURE — G0480 DRUG TEST DEF 1-7 CLASSES: HCPCS

## 2024-04-21 PROCEDURE — 6370000000 HC RX 637 (ALT 250 FOR IP)

## 2024-04-21 RX ORDER — 0.9 % SODIUM CHLORIDE 0.9 %
1000 INTRAVENOUS SOLUTION INTRAVENOUS ONCE
Status: COMPLETED | OUTPATIENT
Start: 2024-04-21 | End: 2024-04-21

## 2024-04-21 RX ORDER — POTASSIUM CHLORIDE 7.45 MG/ML
10 INJECTION INTRAVENOUS ONCE
Status: COMPLETED | OUTPATIENT
Start: 2024-04-21 | End: 2024-04-21

## 2024-04-21 RX ORDER — THIAMINE HYDROCHLORIDE 100 MG/ML
100 INJECTION, SOLUTION INTRAMUSCULAR; INTRAVENOUS ONCE
Status: COMPLETED | OUTPATIENT
Start: 2024-04-21 | End: 2024-04-21

## 2024-04-21 RX ADMIN — THIAMINE HYDROCHLORIDE 100 MG: 100 INJECTION, SOLUTION INTRAMUSCULAR; INTRAVENOUS at 16:23

## 2024-04-21 RX ADMIN — POTASSIUM BICARBONATE 50 MEQ: 978 TABLET, EFFERVESCENT ORAL at 17:11

## 2024-04-21 RX ADMIN — SODIUM CHLORIDE 1000 ML: 9 INJECTION, SOLUTION INTRAVENOUS at 17:10

## 2024-04-21 RX ADMIN — SODIUM CHLORIDE 1000 ML: 9 INJECTION, SOLUTION INTRAVENOUS at 16:20

## 2024-04-21 RX ADMIN — POTASSIUM CHLORIDE 10 MEQ: 7.46 INJECTION, SOLUTION INTRAVENOUS at 17:14

## 2024-04-21 RX ADMIN — IOPAMIDOL 70 ML: 755 INJECTION, SOLUTION INTRAVENOUS at 17:20

## 2024-04-21 NOTE — ED PROVIDER NOTES
Department of Emergency Medicine     Written by: Deena Acuna MD  Patient Name: Douglas Guerra  Admit Date: 2024  3:34 PM  MRN: [id number]                   : 1960    HPI  Chief Complaint   Patient presents with    Dizziness     Pt states he lost his balance and fell face first into a brick wall. EMS states wife told them that pt was confused and did not understand simple direction. Pt admits to 2 beers with lunch. Wife denies LOC, denies the use of blood thinners.       Douglas Guerra is a 64 y.o. male that presents to the ED due to a fall and head injury.  Initially, story reported by EMS and patient was the patient had mechanical fall and he was feeling unsteady as he has a history of drug-induced neuropathy.  He also has a history of colon cancer with resection 4 years ago.  He had a colonoscopy recently, and is concerned due to having a lot of diarrhea.  He normally drinks approximate 10-15 beers daily.  The wife at bedside then reports that today he only had 2 beers.  She is concerned that for approximate 3 minutes, the patient could not comprehend sitting down, and he was confused.  No history of stroke.  She said he then tripped and hit his head on a brick wall in the garage.  This was witnessed.  Did not pass out.  He is not on anticoagulation therapy.  Tetanus vaccination not up-to-date.  She is concerned that his abdomen is a little more distended than usual, and is worried that he has been losing weight over the last couple weeks.  Appetite is reportedly suppressed.    On initial exam, NIH is 0    Review of systems:  Pertinent positives and negatives mentioned in the HPI/MDM.    Physical Exam  Constitutional:       General: He is not in acute distress.     Appearance: Normal appearance. He is not ill-appearing.   HENT:      Mouth/Throat:      Mouth: Mucous membranes are moist.   Eyes:      Extraocular Movements: Extraocular movements intact.      Pupils: Pupils are equal, round, and

## 2024-04-21 NOTE — DISCHARGE INSTRUCTIONS
Please return to ED if symptoms worsen, persist, or occur.  Please follow-up with PCP.  Please take your medications as prescribed.    CT Head W/O Contrast   Final Result   No acute intracranial abnormality.         CT ABDOMEN PELVIS W IV CONTRAST Additional Contrast? None   Final Result   1. Thick walled segment of small bowel is present in left central abdomen   with subtle adjacent edema.  This finding is suggestive of enteritis.   2. Mild generalized thickened wall of the gallbladder.  Note made of a small   calcified gallstone present in the gallbladder.  Clinical correlation   recommended.   3. Hepatic steatosis.   4. Mild bilateral hydronephrosis with mild bilateral hydroureter with   moderate distension of the urinary bladder.  This finding may be physiologic.   Clinical correlation recommended.         CT CSpine W/O Contrast   Final Result   No acute abnormality of the cervical spine.         XR CHEST PORTABLE   Final Result   No acute cardiopulmonary process is identified by portable chest x-ray.

## 2024-04-24 LAB
EKG ATRIAL RATE: 78 BPM
EKG P AXIS: 72 DEGREES
EKG P-R INTERVAL: 224 MS
EKG Q-T INTERVAL: 396 MS
EKG QRS DURATION: 88 MS
EKG QTC CALCULATION (BAZETT): 451 MS
EKG R AXIS: 61 DEGREES
EKG T AXIS: 67 DEGREES
EKG VENTRICULAR RATE: 78 BPM

## 2024-04-24 PROCEDURE — 93010 ELECTROCARDIOGRAM REPORT: CPT | Performed by: INTERNAL MEDICINE

## 2024-07-14 DIAGNOSIS — I10 ESSENTIAL HYPERTENSION: ICD-10-CM

## 2024-07-15 ENCOUNTER — HOSPITAL ENCOUNTER (OUTPATIENT)
Dept: CT IMAGING | Age: 64
Discharge: HOME | End: 2024-07-15
Attending: INTERNAL MEDICINE
Payer: MEDICARE

## 2024-07-15 DIAGNOSIS — C77.2 COLON CANCER METASTASIZED TO INTRA-ABDOMINAL LYMPH NODE (HCC): ICD-10-CM

## 2024-07-15 DIAGNOSIS — C18.9 COLON CANCER METASTASIZED TO INTRA-ABDOMINAL LYMPH NODE (HCC): ICD-10-CM

## 2024-07-15 LAB
BUN SERPL-MCNC: 9 MG/DL (ref 6–23)
CREAT SERPL-MCNC: 1.1 MG/DL (ref 0.7–1.2)
GFR, ESTIMATED: 74 ML/MIN/1.73M2

## 2024-07-15 PROCEDURE — 6360000004 HC RX CONTRAST MEDICATION: Performed by: RADIOLOGY

## 2024-07-15 PROCEDURE — 84520 ASSAY OF UREA NITROGEN: CPT

## 2024-07-15 PROCEDURE — 74177 CT ABD & PELVIS W/CONTRAST: CPT

## 2024-07-15 PROCEDURE — 82565 ASSAY OF CREATININE: CPT

## 2024-07-15 PROCEDURE — 36415 COLL VENOUS BLD VENIPUNCTURE: CPT

## 2024-07-15 RX ORDER — AMLODIPINE BESYLATE 10 MG/1
10 TABLET ORAL DAILY
Qty: 90 TABLET | Refills: 0 | Status: SHIPPED | OUTPATIENT
Start: 2024-07-15 | End: 2024-10-13

## 2024-07-15 RX ADMIN — IOPAMIDOL 75 ML: 755 INJECTION, SOLUTION INTRAVENOUS at 14:18

## 2024-07-15 NOTE — TELEPHONE ENCOUNTER
Last seen 2/15/2024  Next appt Visit date not found    Requested Prescriptions     Pending Prescriptions Disp Refills    amLODIPine (NORVASC) 10 MG tablet 90 tablet 0     Sig: Take 1 tablet by mouth daily

## 2024-07-22 ENCOUNTER — HOSPITAL ENCOUNTER (OUTPATIENT)
Dept: INFUSION THERAPY | Age: 64
Discharge: HOME | End: 2024-07-22
Payer: MEDICARE

## 2024-07-22 DIAGNOSIS — C77.2 COLON CANCER METASTASIZED TO INTRA-ABDOMINAL LYMPH NODE (HCC): ICD-10-CM

## 2024-07-22 DIAGNOSIS — C18.9 COLON CANCER METASTASIZED TO INTRA-ABDOMINAL LYMPH NODE (HCC): ICD-10-CM

## 2024-07-22 LAB
ALBUMIN SERPL-MCNC: 4.4 G/DL (ref 3.5–5.2)
ALP SERPL-CCNC: 142 U/L (ref 40–129)
ALT SERPL-CCNC: 26 U/L (ref 0–40)
ANION GAP SERPL CALCULATED.3IONS-SCNC: 18 MMOL/L (ref 7–16)
AST SERPL-CCNC: 85 U/L (ref 0–39)
BASOPHILS # BLD: 0.07 K/UL (ref 0–0.2)
BASOPHILS NFR BLD: 1 % (ref 0–2)
BILIRUB SERPL-MCNC: 0.7 MG/DL (ref 0–1.2)
BUN SERPL-MCNC: 6 MG/DL (ref 6–23)
CALCIUM SERPL-MCNC: 9.2 MG/DL (ref 8.6–10.2)
CEA SERPL-MCNC: 10.7 NG/ML (ref 0–5.2)
CHLORIDE SERPL-SCNC: 95 MMOL/L (ref 98–107)
CO2 SERPL-SCNC: 21 MMOL/L (ref 22–29)
CREAT SERPL-MCNC: 0.9 MG/DL (ref 0.7–1.2)
EOSINOPHIL # BLD: 0.03 K/UL (ref 0.05–0.5)
EOSINOPHILS RELATIVE PERCENT: 0 % (ref 0–6)
ERYTHROCYTE [DISTWIDTH] IN BLOOD BY AUTOMATED COUNT: 13 % (ref 11.5–15)
GFR, ESTIMATED: 90 ML/MIN/1.73M2
GLUCOSE SERPL-MCNC: 85 MG/DL (ref 74–99)
HCT VFR BLD AUTO: 37.6 % (ref 37–54)
HGB BLD-MCNC: 13.2 G/DL (ref 12.5–16.5)
IMM GRANULOCYTES # BLD AUTO: 0.03 K/UL (ref 0–0.58)
IMM GRANULOCYTES NFR BLD: 0 % (ref 0–5)
LYMPHOCYTES NFR BLD: 0.98 K/UL (ref 1.5–4)
LYMPHOCYTES RELATIVE PERCENT: 13 % (ref 20–42)
MCH RBC QN AUTO: 34.5 PG (ref 26–35)
MCHC RBC AUTO-ENTMCNC: 35.1 G/DL (ref 32–34.5)
MCV RBC AUTO: 98.2 FL (ref 80–99.9)
MONOCYTES NFR BLD: 0.56 K/UL (ref 0.1–0.95)
MONOCYTES NFR BLD: 8 % (ref 2–12)
NEUTROPHILS NFR BLD: 78 % (ref 43–80)
NEUTS SEG NFR BLD: 5.75 K/UL (ref 1.8–7.3)
PLATELET # BLD AUTO: 235 K/UL (ref 130–450)
PMV BLD AUTO: 8.9 FL (ref 7–12)
POTASSIUM SERPL-SCNC: 4 MMOL/L (ref 3.5–5)
PROT SERPL-MCNC: 7.6 G/DL (ref 6.4–8.3)
RBC # BLD AUTO: 3.83 M/UL (ref 3.8–5.8)
SODIUM SERPL-SCNC: 134 MMOL/L (ref 132–146)
WBC OTHER # BLD: 7.4 K/UL (ref 4.5–11.5)

## 2024-07-22 PROCEDURE — 85025 COMPLETE CBC W/AUTO DIFF WBC: CPT

## 2024-07-22 PROCEDURE — 82378 CARCINOEMBRYONIC ANTIGEN: CPT

## 2024-07-22 PROCEDURE — 36415 COLL VENOUS BLD VENIPUNCTURE: CPT

## 2024-07-22 PROCEDURE — 80053 COMPREHEN METABOLIC PANEL: CPT

## 2024-07-23 ENCOUNTER — OFFICE VISIT (OUTPATIENT)
Dept: ONCOLOGY | Age: 64
End: 2024-07-23
Payer: MEDICARE

## 2024-07-23 VITALS
DIASTOLIC BLOOD PRESSURE: 71 MMHG | OXYGEN SATURATION: 100 % | HEIGHT: 71 IN | TEMPERATURE: 98.2 F | SYSTOLIC BLOOD PRESSURE: 112 MMHG | WEIGHT: 136.5 LBS | HEART RATE: 109 BPM | BODY MASS INDEX: 19.11 KG/M2

## 2024-07-23 DIAGNOSIS — C18.9 COLON CANCER METASTASIZED TO INTRA-ABDOMINAL LYMPH NODE (HCC): Primary | ICD-10-CM

## 2024-07-23 DIAGNOSIS — C77.2 COLON CANCER METASTASIZED TO INTRA-ABDOMINAL LYMPH NODE (HCC): Primary | ICD-10-CM

## 2024-07-23 PROCEDURE — 99212 OFFICE O/P EST SF 10 MIN: CPT

## 2024-07-23 NOTE — PROGRESS NOTES
[facility]  [address] [place] [address]   Hematology/Oncology  Consult      Patient Name: Douglas Guerra  YOB: 1960  PCP: Jay Noriega MD   Referring Provider:      Reason for Consultation:   Chief Complaint   Patient presents with    Follow-up     Colon cancer metastasized to intra-abdominal lymph node        Subjective:  Feels well today and in interim. BMs unchanged, no bleeding, no weight loss. Neuropathy stable on 1 gabapentin dose per day. Still smoking 1/2 pack per day    History of Present Illness:  This pt is a pleasant 61 yo male who was diagnosed with colon cancer in 11/19 (reportedly stage IIIB), s/p resection and 2/31 LNs positive, and reportedly received adj chemotherapy and has chronic neuropathy from this. No records are available for review today, however he states he received 8 cycles of full dose FOLFOX and 4 cycles of DR FOLFOX. He still has his PORT. He has no acute complaints today, including abdominal pain, change in bowel habits, pain with defecation, tenesmus, hematochezia or melena. He has maintained a stable weight. He overall feels well    Diagnostic Data:     Past Medical History:   Diagnosis Date    Cataract     bilateral    Colon cancer (HCC)     Hypertension     Neuropathy     finger/feet from chemotherapy       Patient Active Problem List    Diagnosis Date Noted    Hyperlipidemia 07/11/2023    Alcohol use disorder 07/11/2023    Abnormal LFTs 03/25/2021    Alcohol use 03/25/2021    Colon cancer metastasized to intra-abdominal lymph node (HCC) 12/17/2020    Neuropathy due to chemotherapeutic drug (HCC) 12/17/2020    Essential hypertension 12/17/2020    Smoker 12/17/2020        Past Surgical History:   Procedure Laterality Date    CATHETER REMOVAL Left 8/11/2022    MEDIPORT CATHETER REMOVAL performed by Isabel Costa MD at [facility] OR    COLON SURGERY  2019    COLONOSCOPY      EYE SURGERY Bilateral     MEDIPORT INSERTION, SINGLE Left

## 2024-10-15 ENCOUNTER — OFFICE VISIT (OUTPATIENT)
Dept: FAMILY MEDICINE CLINIC | Age: 64
End: 2024-10-15
Payer: MEDICARE

## 2024-10-15 VITALS
OXYGEN SATURATION: 99 % | HEART RATE: 113 BPM | SYSTOLIC BLOOD PRESSURE: 126 MMHG | HEIGHT: 71 IN | DIASTOLIC BLOOD PRESSURE: 74 MMHG | BODY MASS INDEX: 19.36 KG/M2 | TEMPERATURE: 97.8 F | WEIGHT: 138.3 LBS

## 2024-10-15 DIAGNOSIS — Z00.00 MEDICARE ANNUAL WELLNESS VISIT, SUBSEQUENT: Primary | ICD-10-CM

## 2024-10-15 DIAGNOSIS — I10 ESSENTIAL HYPERTENSION: ICD-10-CM

## 2024-10-15 PROCEDURE — G8484 FLU IMMUNIZE NO ADMIN: HCPCS | Performed by: INTERNAL MEDICINE

## 2024-10-15 PROCEDURE — G0439 PPPS, SUBSEQ VISIT: HCPCS | Performed by: INTERNAL MEDICINE

## 2024-10-15 PROCEDURE — 3017F COLORECTAL CA SCREEN DOC REV: CPT | Performed by: INTERNAL MEDICINE

## 2024-10-15 PROCEDURE — 3078F DIAST BP <80 MM HG: CPT | Performed by: INTERNAL MEDICINE

## 2024-10-15 PROCEDURE — 1123F ACP DISCUSS/DSCN MKR DOCD: CPT | Performed by: INTERNAL MEDICINE

## 2024-10-15 PROCEDURE — 3074F SYST BP LT 130 MM HG: CPT | Performed by: INTERNAL MEDICINE

## 2024-10-15 RX ORDER — AMLODIPINE BESYLATE 10 MG/1
10 TABLET ORAL DAILY
Qty: 90 TABLET | Refills: 0 | Status: SHIPPED | OUTPATIENT
Start: 2024-10-15 | End: 2025-01-13

## 2024-10-15 ASSESSMENT — LIFESTYLE VARIABLES
HOW OFTEN DO YOU HAVE A DRINK CONTAINING ALCOHOL: 4 OR MORE TIMES A WEEK
HOW OFTEN DURING THE LAST YEAR HAVE YOU NEEDED AN ALCOHOLIC DRINK FIRST THING IN THE MORNING TO GET YOURSELF GOING AFTER A NIGHT OF HEAVY DRINKING: NEVER
HOW OFTEN DURING THE LAST YEAR HAVE YOU HAD A FEELING OF GUILT OR REMORSE AFTER DRINKING: NEVER
HAS A RELATIVE, FRIEND, DOCTOR, OR ANOTHER HEALTH PROFESSIONAL EXPRESSED CONCERN ABOUT YOUR DRINKING OR SUGGESTED YOU CUT DOWN: YES, DURING THE PAST YEAR
HOW MANY STANDARD DRINKS CONTAINING ALCOHOL DO YOU HAVE ON A TYPICAL DAY: 3 OR 4
HOW OFTEN DURING THE LAST YEAR HAVE YOU FAILED TO DO WHAT WAS NORMALLY EXPECTED FROM YOU BECAUSE OF DRINKING: NEVER
HOW OFTEN DURING THE LAST YEAR HAVE YOU BEEN UNABLE TO REMEMBER WHAT HAPPENED THE NIGHT BEFORE BECAUSE YOU HAD BEEN DRINKING: NEVER
HOW OFTEN DURING THE LAST YEAR HAVE YOU FOUND THAT YOU WERE NOT ABLE TO STOP DRINKING ONCE YOU HAD STARTED: NEVER
HAVE YOU OR SOMEONE ELSE BEEN INJURED AS A RESULT OF YOUR DRINKING: NO

## 2024-10-15 ASSESSMENT — PATIENT HEALTH QUESTIONNAIRE - PHQ9
SUM OF ALL RESPONSES TO PHQ QUESTIONS 1-9: 0
SUM OF ALL RESPONSES TO PHQ QUESTIONS 1-9: 0
SUM OF ALL RESPONSES TO PHQ9 QUESTIONS 1 & 2: 0
1. LITTLE INTEREST OR PLEASURE IN DOING THINGS: NOT AT ALL
SUM OF ALL RESPONSES TO PHQ QUESTIONS 1-9: 0
2. FEELING DOWN, DEPRESSED OR HOPELESS: NOT AT ALL
SUM OF ALL RESPONSES TO PHQ QUESTIONS 1-9: 0

## 2024-10-15 NOTE — PROGRESS NOTES
Medicare Annual Wellness Visit    Douglas Guerra is here for Medicare AWV, Medication Refill, and Health Maintenance (Flu vaccine - pt declined,  Pneumonia vaccine - pt declined  )    Assessment & Plan   Essential hypertension  -     amLODIPine (NORVASC) 10 MG tablet; Take 1 tablet by mouth daily, Disp-90 tablet, R-0Normal    Recommendations for Preventive Services Due: see orders and patient instructions/AVS.  Recommended screening schedule for the next 5-10 years is provided to the patient in written form: see Patient Instructions/AVS.     No follow-ups on file.     Subjective   Pt says he is feeling well  Colonoscopy- march 2024- adv to be repeated next yr    Following with heme onc     Patient's complete Health Risk Assessment and screening values have been reviewed and are found in Flowsheets. The following problems were reviewed today and where indicated follow up appointments were made and/or referrals ordered.    Positive Risk Factor Screenings with Interventions:         Alcohol Screening:  AUDIT-C Score: 5  AUDIT Total Score: 9  Total Score Interpretation: 8-14 suggests harmful or hazardous alcohol consumption in men   Interventions:  Alcohol consumption guidelines reviewed. Abstinence recommended              Inactivity:  On average, how many days per week do you engage in moderate to strenuous exercise (like a brisk walk)?: 0 days (!) Abnormal  On average, how many minutes do you engage in exercise at this level?: 0 min  Interventions:  See AVS for additional education material      Dentist Screen:  Have you seen the dentist within the past year?: (!) No    Intervention:  Advised to schedule with their dentist     Vision Screen:  Do you have difficulty driving, watching TV, or doing any of your daily activities because of your eyesight?: No  Have you had an eye exam within the past year?: (!) No  Interventions:   Patient encouraged to make appointment with their eye specialist    Safety:  Do you have

## 2024-10-15 NOTE — PATIENT INSTRUCTIONS
arms.     Lightheadedness or sudden weakness.     A fast or irregular heartbeat.   After you call 911, the  may tell you to chew 1 adult-strength or 2 to 4 low-dose aspirin. Wait for an ambulance. Do not try to drive yourself.  Watch closely for changes in your health, and be sure to contact your doctor if you have any problems.  Where can you learn more?  Go to https://www.[employer].net/patientEd and enter F075 to learn more about \"A Healthy Heart: Care Instructions.\"  Current as of: June 24, 2023  Content Version: 14.2  © 2024 [employer].   Care instructions adapted under license by [employer]. If you have questions about a medical condition or this instruction, always ask your healthcare professional. [employer] disclaims any warranty or liability for your use of this information.      Personalized Preventive Plan for Douglas Guerra - 10/15/2024  Medicare offers a range of preventive health benefits. Some of the tests and screenings are paid in full while other may be subject to a deductible, co-insurance, and/or copay.    Some of these benefits include a comprehensive review of your medical history including lifestyle, illnesses that may run in your family, and various assessments and screenings as appropriate.    After reviewing your medical record and screening and assessments performed today your provider may have ordered immunizations, labs, imaging, and/or referrals for you.  A list of these orders (if applicable) as well as your Preventive Care list are included within your After Visit Summary for your review.    Other Preventive Recommendations:    A preventive eye exam performed by an eye specialist is recommended every 1-2 years to screen for glaucoma; cataracts, macular degeneration, and other eye disorders.  A preventive dental visit is recommended every 6 months.  Try to get at least 150 minutes of exercise per week or 10,000 steps per day on a pedometer .  Order

## 2024-12-17 ENCOUNTER — OFFICE VISIT (OUTPATIENT)
Dept: FAMILY MEDICINE CLINIC | Age: 64
End: 2024-12-17
Payer: COMMERCIAL

## 2024-12-17 VITALS
WEIGHT: 137.9 LBS | BODY MASS INDEX: 19.31 KG/M2 | TEMPERATURE: 98.6 F | OXYGEN SATURATION: 99 % | HEART RATE: 104 BPM | DIASTOLIC BLOOD PRESSURE: 72 MMHG | SYSTOLIC BLOOD PRESSURE: 128 MMHG | HEIGHT: 71 IN

## 2024-12-17 DIAGNOSIS — G62.0 NEUROPATHY DUE TO CHEMOTHERAPEUTIC DRUG (HCC): ICD-10-CM

## 2024-12-17 DIAGNOSIS — E78.5 HYPERLIPIDEMIA, UNSPECIFIED HYPERLIPIDEMIA TYPE: Primary | ICD-10-CM

## 2024-12-17 DIAGNOSIS — T45.1X5A NEUROPATHY DUE TO CHEMOTHERAPEUTIC DRUG (HCC): ICD-10-CM

## 2024-12-17 DIAGNOSIS — R79.89 ABNORMAL LFTS: ICD-10-CM

## 2024-12-17 DIAGNOSIS — I10 ESSENTIAL HYPERTENSION: ICD-10-CM

## 2024-12-17 DIAGNOSIS — F17.200 SMOKER: ICD-10-CM

## 2024-12-17 DIAGNOSIS — F10.90 ALCOHOL USE DISORDER: ICD-10-CM

## 2024-12-17 PROCEDURE — 1123F ACP DISCUSS/DSCN MKR DOCD: CPT | Performed by: INTERNAL MEDICINE

## 2024-12-17 PROCEDURE — 3074F SYST BP LT 130 MM HG: CPT | Performed by: INTERNAL MEDICINE

## 2024-12-17 PROCEDURE — 99214 OFFICE O/P EST MOD 30 MIN: CPT | Performed by: INTERNAL MEDICINE

## 2024-12-17 PROCEDURE — 3078F DIAST BP <80 MM HG: CPT | Performed by: INTERNAL MEDICINE

## 2024-12-17 RX ORDER — AMLODIPINE BESYLATE 10 MG/1
10 TABLET ORAL DAILY
Qty: 90 TABLET | Refills: 0 | Status: SHIPPED | OUTPATIENT
Start: 2024-12-17 | End: 2025-03-17

## 2024-12-17 ASSESSMENT — ENCOUNTER SYMPTOMS
BLOOD IN STOOL: 0
NAUSEA: 0
SHORTNESS OF BREATH: 0
ABDOMINAL PAIN: 0
EYE DISCHARGE: 0

## 2024-12-17 NOTE — PROGRESS NOTES
Chief Complaint   Patient presents with    Hypertension     Pt here for follow up on hypertension, pt reports feeling pretty good.     Medication Refill    Health Maintenance     Shingles vaccine, DTaP       HPI:  Patient is here for follow-up     Feeling well    Smoke- 1/2 PPD    Adv again for smoking cessations and options to help with smoking cessation    Has non healing skin lesion left forehead- Adv to be ref to Derm     Allergy and Medications are reviewed and updated.  Past Medical History, Surgical History, and Family History has been reviewed and updated.    Review of Systems:  Review of Systems   Constitutional:  Negative for chills and fever.   HENT:  Negative for congestion and ear pain.    Eyes:  Negative for discharge.   Respiratory:  Negative for shortness of breath (No new SOB).    Cardiovascular:  Negative for chest pain and leg swelling.   Gastrointestinal:  Negative for abdominal pain, blood in stool and nausea.   Endocrine: Negative for polydipsia.   Genitourinary:  Negative for flank pain and hematuria.   Musculoskeletal:  Negative for myalgias and neck pain.   Skin:  Negative for rash.   Neurological:  Negative for dizziness and seizures.   Hematological:  Does not bruise/bleed easily.   Psychiatric/Behavioral:  Negative for hallucinations and suicidal ideas.          Vitals:    12/17/24 1011   BP: 128/72   Position: Sitting   Pulse: (!) 104   Temp: 98.6 °F (37 °C)   TempSrc: Temporal   SpO2: 99%   Weight: 62.6 kg (137 lb 14.4 oz)   Height: 1.803 m (5' 11\")       Physical Exam  Vitals reviewed.   Constitutional:       Appearance: He is well-developed.   HENT:      Head: Normocephalic and atraumatic.   Eyes:      Conjunctiva/sclera: Conjunctivae normal.      Pupils: Pupils are equal, round, and reactive to light.   Neck:      Vascular: No JVD.   Cardiovascular:      Rate and Rhythm: Normal rate and regular rhythm.   Pulmonary:      Effort: Pulmonary effort is normal.      Breath sounds: Normal

## 2025-01-20 ENCOUNTER — HOSPITAL ENCOUNTER (OUTPATIENT)
Dept: INFUSION THERAPY | Age: 65
Discharge: HOME | End: 2025-01-20
Payer: COMMERCIAL

## 2025-01-20 DIAGNOSIS — C18.9 COLON CANCER METASTASIZED TO INTRA-ABDOMINAL LYMPH NODE (HCC): ICD-10-CM

## 2025-01-20 DIAGNOSIS — C77.2 COLON CANCER METASTASIZED TO INTRA-ABDOMINAL LYMPH NODE (HCC): ICD-10-CM

## 2025-01-20 LAB
ALBUMIN SERPL-MCNC: 4.1 G/DL (ref 3.5–5.2)
ALP SERPL-CCNC: 147 U/L (ref 40–129)
ALT SERPL-CCNC: 35 U/L (ref 0–40)
ANION GAP SERPL CALCULATED.3IONS-SCNC: 15 MMOL/L (ref 7–16)
AST SERPL-CCNC: 57 U/L (ref 0–39)
BASOPHILS # BLD: 0.05 K/UL (ref 0–0.2)
BASOPHILS NFR BLD: 1 % (ref 0–2)
BILIRUB SERPL-MCNC: 1.5 MG/DL (ref 0–1.2)
BUN SERPL-MCNC: 7 MG/DL (ref 6–23)
CALCIUM SERPL-MCNC: 9.6 MG/DL (ref 8.6–10.2)
CEA SERPL-MCNC: 8.5 NG/ML (ref 0–5.2)
CHLORIDE SERPL-SCNC: 86 MMOL/L (ref 98–107)
CO2 SERPL-SCNC: 26 MMOL/L (ref 22–29)
CREAT SERPL-MCNC: 1 MG/DL (ref 0.7–1.2)
EOSINOPHIL # BLD: 0.04 K/UL (ref 0.05–0.5)
EOSINOPHILS RELATIVE PERCENT: 1 % (ref 0–6)
ERYTHROCYTE [DISTWIDTH] IN BLOOD BY AUTOMATED COUNT: 13 % (ref 11.5–15)
GFR, ESTIMATED: 88 ML/MIN/1.73M2
GLUCOSE SERPL-MCNC: 115 MG/DL (ref 74–99)
HCT VFR BLD AUTO: 35.6 % (ref 37–54)
HGB BLD-MCNC: 12.8 G/DL (ref 12.5–16.5)
IMM GRANULOCYTES # BLD AUTO: 0.11 K/UL (ref 0–0.58)
IMM GRANULOCYTES NFR BLD: 2 % (ref 0–5)
LYMPHOCYTES NFR BLD: 1.15 K/UL (ref 1.5–4)
LYMPHOCYTES RELATIVE PERCENT: 18 % (ref 20–42)
MCH RBC QN AUTO: 34.7 PG (ref 26–35)
MCHC RBC AUTO-ENTMCNC: 36 G/DL (ref 32–34.5)
MCV RBC AUTO: 96.5 FL (ref 80–99.9)
MONOCYTES NFR BLD: 0.64 K/UL (ref 0.1–0.95)
MONOCYTES NFR BLD: 10 % (ref 2–12)
NEUTROPHILS NFR BLD: 69 % (ref 43–80)
NEUTS SEG NFR BLD: 4.46 K/UL (ref 1.8–7.3)
PLATELET # BLD AUTO: 349 K/UL (ref 130–450)
PMV BLD AUTO: 9.6 FL (ref 7–12)
POTASSIUM SERPL-SCNC: 3.5 MMOL/L (ref 3.5–5)
PROT SERPL-MCNC: 7.7 G/DL (ref 6.4–8.3)
RBC # BLD AUTO: 3.69 M/UL (ref 3.8–5.8)
SODIUM SERPL-SCNC: 127 MMOL/L (ref 132–146)
WBC OTHER # BLD: 6.5 K/UL (ref 4.5–11.5)

## 2025-01-20 PROCEDURE — 80053 COMPREHEN METABOLIC PANEL: CPT

## 2025-01-20 PROCEDURE — 36415 COLL VENOUS BLD VENIPUNCTURE: CPT

## 2025-01-20 PROCEDURE — 82378 CARCINOEMBRYONIC ANTIGEN: CPT

## 2025-01-20 PROCEDURE — 85025 COMPLETE CBC W/AUTO DIFF WBC: CPT

## 2025-01-21 ENCOUNTER — OFFICE VISIT (OUTPATIENT)
Dept: ONCOLOGY | Age: 65
End: 2025-01-21
Payer: COMMERCIAL

## 2025-01-21 VITALS
BODY MASS INDEX: 18.27 KG/M2 | DIASTOLIC BLOOD PRESSURE: 75 MMHG | TEMPERATURE: 97.5 F | HEIGHT: 71 IN | SYSTOLIC BLOOD PRESSURE: 106 MMHG | WEIGHT: 130.5 LBS | HEART RATE: 119 BPM | OXYGEN SATURATION: 100 %

## 2025-01-21 DIAGNOSIS — C18.9 COLON CANCER METASTASIZED TO INTRA-ABDOMINAL LYMPH NODE (HCC): Primary | ICD-10-CM

## 2025-01-21 DIAGNOSIS — C77.2 COLON CANCER METASTASIZED TO INTRA-ABDOMINAL LYMPH NODE (HCC): Primary | ICD-10-CM

## 2025-01-21 PROCEDURE — 99213 OFFICE O/P EST LOW 20 MIN: CPT

## 2025-01-21 NOTE — PROGRESS NOTES
[facility]  [address] [place] [address]   Hematology/Oncology  Consult      Patient Name: Douglas Guerra  YOB: 1960  PCP: Jay Noriega MD   Referring Provider:      Reason for Consultation:   Chief Complaint   Patient presents with    Follow-up     Colon cancer metastasized to intra-abdominal lymph node (HCC)            Subjective:  Feels well today and in interim. BMs unchanged, no bleeding, no weight loss. Neuropathy stable on 1 gabapentin dose per day. Still smoking, <1/2 pack per day    History of Present Illness:  This pt is a pleasant 61 yo male who was diagnosed with colon cancer in 11/19 (reportedly stage IIIB), s/p resection and 2/31 LNs positive, and reportedly received adj chemotherapy and has chronic neuropathy from this. No records are available for review today, however he states he received 8 cycles of full dose FOLFOX and 4 cycles of DR FOLFOX. He still has his PORT. He has no acute complaints today, including abdominal pain, change in bowel habits, pain with defecation, tenesmus, hematochezia or melena. He has maintained a stable weight. He overall feels well    Diagnostic Data:     Past Medical History:   Diagnosis Date    Cataract     bilateral    Colon cancer (HCC)     Hypertension     Neuropathy     finger/feet from chemotherapy       Patient Active Problem List    Diagnosis Date Noted    Hyperlipidemia 07/11/2023    Alcohol use disorder 07/11/2023    Abnormal LFTs 03/25/2021    Alcohol use 03/25/2021    Colon cancer metastasized to intra-abdominal lymph node (HCC) 12/17/2020    Neuropathy due to chemotherapeutic drug (HCC) 12/17/2020    Essential hypertension 12/17/2020    Smoker 12/17/2020        Past Surgical History:   Procedure Laterality Date    CATHETER REMOVAL Left 8/11/2022    MEDIPORT CATHETER REMOVAL performed by Isabel Costa MD at [email or web address] OR    COLON SURGERY  2019    COLONOSCOPY      EYE SURGERY Bilateral     MEDIPORT INSERTION,

## 2025-01-30 ENCOUNTER — HOSPITAL ENCOUNTER (OUTPATIENT)
Age: 65
Discharge: HOME | End: 2025-01-30
Payer: COMMERCIAL

## 2025-01-30 DIAGNOSIS — C77.2 COLON CANCER METASTASIZED TO INTRA-ABDOMINAL LYMPH NODE (HCC): ICD-10-CM

## 2025-01-30 DIAGNOSIS — C18.9 COLON CANCER METASTASIZED TO INTRA-ABDOMINAL LYMPH NODE (HCC): ICD-10-CM

## 2025-01-30 LAB
ALBUMIN SERPL-MCNC: 3.8 G/DL (ref 3.5–5.2)
ALP SERPL-CCNC: 135 U/L (ref 40–129)
ALT SERPL-CCNC: 18 U/L (ref 0–40)
ANION GAP SERPL CALCULATED.3IONS-SCNC: 9 MMOL/L (ref 7–16)
AST SERPL-CCNC: 40 U/L (ref 0–39)
BASOPHILS # BLD: 0.03 K/UL (ref 0–0.2)
BASOPHILS NFR BLD: 1 % (ref 0–2)
BILIRUB SERPL-MCNC: 1 MG/DL (ref 0–1.2)
BUN SERPL-MCNC: 9 MG/DL (ref 6–23)
CALCIUM SERPL-MCNC: 9.4 MG/DL (ref 8.6–10.2)
CHLORIDE SERPL-SCNC: 93 MMOL/L (ref 98–107)
CO2 SERPL-SCNC: 26 MMOL/L (ref 22–29)
CREAT SERPL-MCNC: 0.9 MG/DL (ref 0.7–1.2)
EOSINOPHIL # BLD: 0.02 K/UL (ref 0.05–0.5)
EOSINOPHILS RELATIVE PERCENT: 0 % (ref 0–6)
ERYTHROCYTE [DISTWIDTH] IN BLOOD BY AUTOMATED COUNT: 13.5 % (ref 11.5–15)
GFR, ESTIMATED: >90 ML/MIN/1.73M2
GLUCOSE SERPL-MCNC: 104 MG/DL (ref 74–99)
HCT VFR BLD AUTO: 31.2 % (ref 37–54)
HGB BLD-MCNC: 10.8 G/DL (ref 12.5–16.5)
IMM GRANULOCYTES # BLD AUTO: 0.05 K/UL (ref 0–0.58)
IMM GRANULOCYTES NFR BLD: 1 % (ref 0–5)
LYMPHOCYTES NFR BLD: 0.98 K/UL (ref 1.5–4)
LYMPHOCYTES RELATIVE PERCENT: 19 % (ref 20–42)
MCH RBC QN AUTO: 33.8 PG (ref 26–35)
MCHC RBC AUTO-ENTMCNC: 34.6 G/DL (ref 32–34.5)
MCV RBC AUTO: 97.5 FL (ref 80–99.9)
MONOCYTES NFR BLD: 0.55 K/UL (ref 0.1–0.95)
MONOCYTES NFR BLD: 11 % (ref 2–12)
NEUTROPHILS NFR BLD: 68 % (ref 43–80)
NEUTS SEG NFR BLD: 3.43 K/UL (ref 1.8–7.3)
PLATELET # BLD AUTO: 243 K/UL (ref 130–450)
PMV BLD AUTO: 9.1 FL (ref 7–12)
POTASSIUM SERPL-SCNC: 4.7 MMOL/L (ref 3.5–5)
PROT SERPL-MCNC: 6.9 G/DL (ref 6.4–8.3)
RBC # BLD AUTO: 3.2 M/UL (ref 3.8–5.8)
SODIUM SERPL-SCNC: 128 MMOL/L (ref 132–146)
WBC OTHER # BLD: 5.1 K/UL (ref 4.5–11.5)

## 2025-01-30 PROCEDURE — 80053 COMPREHEN METABOLIC PANEL: CPT

## 2025-01-30 PROCEDURE — 36415 COLL VENOUS BLD VENIPUNCTURE: CPT

## 2025-01-30 PROCEDURE — 85025 COMPLETE CBC W/AUTO DIFF WBC: CPT

## 2025-01-31 ENCOUNTER — HOSPITAL ENCOUNTER (OUTPATIENT)
Dept: CT IMAGING | Age: 65
Discharge: HOME | End: 2025-01-31
Attending: INTERNAL MEDICINE
Payer: COMMERCIAL

## 2025-01-31 DIAGNOSIS — C18.9 COLON CANCER METASTASIZED TO INTRA-ABDOMINAL LYMPH NODE (HCC): ICD-10-CM

## 2025-01-31 DIAGNOSIS — C77.2 COLON CANCER METASTASIZED TO INTRA-ABDOMINAL LYMPH NODE (HCC): ICD-10-CM

## 2025-01-31 PROCEDURE — 6360000004 HC RX CONTRAST MEDICATION: Performed by: RADIOLOGY

## 2025-01-31 PROCEDURE — 74177 CT ABD & PELVIS W/CONTRAST: CPT

## 2025-01-31 RX ORDER — IOPAMIDOL 755 MG/ML
75 INJECTION, SOLUTION INTRAVASCULAR
Status: COMPLETED | OUTPATIENT
Start: 2025-01-31 | End: 2025-01-31

## 2025-01-31 RX ADMIN — IOPAMIDOL 75 ML: 755 INJECTION, SOLUTION INTRAVENOUS at 18:10

## 2025-02-05 ENCOUNTER — HOSPITAL ENCOUNTER (OUTPATIENT)
Dept: ULTRASOUND IMAGING | Age: 65
Discharge: HOME | End: 2025-02-05
Payer: COMMERCIAL

## 2025-02-05 DIAGNOSIS — R79.89 ABNORMAL LFTS: ICD-10-CM

## 2025-02-05 PROCEDURE — 76705 ECHO EXAM OF ABDOMEN: CPT

## 2025-02-10 ENCOUNTER — HOSPITAL ENCOUNTER (OUTPATIENT)
Dept: INFUSION THERAPY | Age: 65
Discharge: HOME | End: 2025-02-10
Payer: COMMERCIAL

## 2025-02-10 DIAGNOSIS — C18.9 COLON CANCER METASTASIZED TO INTRA-ABDOMINAL LYMPH NODE (HCC): Primary | ICD-10-CM

## 2025-02-10 DIAGNOSIS — C77.2 COLON CANCER METASTASIZED TO INTRA-ABDOMINAL LYMPH NODE (HCC): Primary | ICD-10-CM

## 2025-02-10 LAB
ALBUMIN SERPL-MCNC: 3.7 G/DL (ref 3.5–5.2)
ALP SERPL-CCNC: 158 U/L (ref 40–129)
ALT SERPL-CCNC: 16 U/L (ref 0–40)
ANION GAP SERPL CALCULATED.3IONS-SCNC: 12 MMOL/L (ref 7–16)
AST SERPL-CCNC: 43 U/L (ref 0–39)
BASOPHILS # BLD: 0.03 K/UL (ref 0–0.2)
BASOPHILS NFR BLD: 1 % (ref 0–2)
BILIRUB SERPL-MCNC: 0.7 MG/DL (ref 0–1.2)
BUN SERPL-MCNC: 9 MG/DL (ref 6–23)
CALCIUM SERPL-MCNC: 8.5 MG/DL (ref 8.6–10.2)
CHLORIDE SERPL-SCNC: 100 MMOL/L (ref 98–107)
CO2 SERPL-SCNC: 24 MMOL/L (ref 22–29)
CREAT SERPL-MCNC: 0.9 MG/DL (ref 0.7–1.2)
EOSINOPHIL # BLD: 0.02 K/UL (ref 0.05–0.5)
EOSINOPHILS RELATIVE PERCENT: 1 % (ref 0–6)
ERYTHROCYTE [DISTWIDTH] IN BLOOD BY AUTOMATED COUNT: 13.9 % (ref 11.5–15)
GFR, ESTIMATED: >90 ML/MIN/1.73M2
GLUCOSE SERPL-MCNC: 94 MG/DL (ref 74–99)
HCT VFR BLD AUTO: 31.8 % (ref 37–54)
HGB BLD-MCNC: 11 G/DL (ref 12.5–16.5)
IMM GRANULOCYTES # BLD AUTO: 0.04 K/UL (ref 0–0.58)
IMM GRANULOCYTES NFR BLD: 1 % (ref 0–5)
LYMPHOCYTES NFR BLD: 0.85 K/UL (ref 1.5–4)
LYMPHOCYTES RELATIVE PERCENT: 20 % (ref 20–42)
MCH RBC QN AUTO: 33.4 PG (ref 26–35)
MCHC RBC AUTO-ENTMCNC: 34.6 G/DL (ref 32–34.5)
MCV RBC AUTO: 96.7 FL (ref 80–99.9)
MONOCYTES NFR BLD: 0.38 K/UL (ref 0.1–0.95)
MONOCYTES NFR BLD: 9 % (ref 2–12)
NEUTROPHILS NFR BLD: 70 % (ref 43–80)
NEUTS SEG NFR BLD: 3.05 K/UL (ref 1.8–7.3)
PLATELET # BLD AUTO: 182 K/UL (ref 130–450)
PMV BLD AUTO: 8.1 FL (ref 7–12)
POTASSIUM SERPL-SCNC: 4 MMOL/L (ref 3.5–5)
PROT SERPL-MCNC: 7 G/DL (ref 6.4–8.3)
RBC # BLD AUTO: 3.29 M/UL (ref 3.8–5.8)
SODIUM SERPL-SCNC: 136 MMOL/L (ref 132–146)
WBC OTHER # BLD: 4.4 K/UL (ref 4.5–11.5)

## 2025-02-10 PROCEDURE — 36415 COLL VENOUS BLD VENIPUNCTURE: CPT

## 2025-02-10 PROCEDURE — 80053 COMPREHEN METABOLIC PANEL: CPT

## 2025-02-10 PROCEDURE — 85025 COMPLETE CBC W/AUTO DIFF WBC: CPT

## 2025-02-11 ENCOUNTER — OFFICE VISIT (OUTPATIENT)
Dept: ONCOLOGY | Age: 65
End: 2025-02-11
Payer: COMMERCIAL

## 2025-02-11 VITALS
HEIGHT: 71 IN | WEIGHT: 134.3 LBS | BODY MASS INDEX: 18.8 KG/M2 | HEART RATE: 110 BPM | OXYGEN SATURATION: 100 % | DIASTOLIC BLOOD PRESSURE: 79 MMHG | SYSTOLIC BLOOD PRESSURE: 130 MMHG | TEMPERATURE: 97.3 F

## 2025-02-11 DIAGNOSIS — C18.9 COLON CANCER METASTASIZED TO INTRA-ABDOMINAL LYMPH NODE (HCC): Primary | ICD-10-CM

## 2025-02-11 DIAGNOSIS — C77.2 COLON CANCER METASTASIZED TO INTRA-ABDOMINAL LYMPH NODE (HCC): Primary | ICD-10-CM

## 2025-02-11 PROCEDURE — 99213 OFFICE O/P EST LOW 20 MIN: CPT

## 2025-02-11 NOTE — PROGRESS NOTES
[facility]  [address] [place] [address]   Hematology/Oncology  Consult      Patient Name: Douglas Guerra  YOB: 1960  PCP: Jay Noriega MD   Referring Provider:      Reason for Consultation:   Chief Complaint   Patient presents with    Colon Cancer    Follow-up        Subjective:  Feels well today and in interim. BMs unchanged, no bleeding, no weight loss. Neuropathy stable on 1 gabapentin dose per day. Still smoking, <1/2 pack per day. Happy with CT A/P results    History of Present Illness:  This pt is a pleasant 61 yo male who was diagnosed with colon cancer in 11/19 (reportedly stage IIIB), s/p resection and 2/31 LNs positive, and reportedly received adj chemotherapy and has chronic neuropathy from this. No records are available for review today, however he states he received 8 cycles of full dose FOLFOX and 4 cycles of DR FOLFOX. He still has his PORT. He has no acute complaints today, including abdominal pain, change in bowel habits, pain with defecation, tenesmus, hematochezia or melena. He has maintained a stable weight. He overall feels well    Diagnostic Data:     Past Medical History:   Diagnosis Date    Cataract     bilateral    Colon cancer (HCC)     Hypertension     Neuropathy     finger/feet from chemotherapy       Patient Active Problem List    Diagnosis Date Noted    Hyperlipidemia 07/11/2023    Alcohol use disorder 07/11/2023    Abnormal LFTs 03/25/2021    Alcohol use 03/25/2021    Colon cancer metastasized to intra-abdominal lymph node (HCC) 12/17/2020    Neuropathy due to chemotherapeutic drug (HCC) 12/17/2020    Essential hypertension 12/17/2020    Smoker 12/17/2020        Past Surgical History:   Procedure Laterality Date    CATHETER REMOVAL Left 8/11/2022    MEDIPORT CATHETER REMOVAL performed by Isabel Costa MD at [facility] OR    COLON SURGERY  2019    COLONOSCOPY      EYE SURGERY Bilateral     MEDIPORT INSERTION, SINGLE Left 2020    Done in

## 2025-02-19 DIAGNOSIS — I10 ESSENTIAL HYPERTENSION: ICD-10-CM

## 2025-02-19 RX ORDER — AMLODIPINE BESYLATE 10 MG/1
10 TABLET ORAL DAILY
Qty: 90 TABLET | Refills: 0 | Status: SHIPPED | OUTPATIENT
Start: 2025-02-19 | End: 2025-05-20

## 2025-02-19 NOTE — TELEPHONE ENCOUNTER
Name of Medication(s) Requested:  Requested Prescriptions     Pending Prescriptions Disp Refills    amLODIPine (NORVASC) 10 MG tablet 90 tablet 0     Sig: Take 1 tablet by mouth daily       Medication is on current medication list Yes    Dosage and directions were verified? Yes    Quantity verified: 90 day supply     Pharmacy Verified?  Yes    Last Appointment:  12/17/2024    Future appts:  Future Appointments   Date Time Provider Department Center   3/20/2025  9:45 AM Jay Noriega MD [place] [facility] ECC DEP   8/11/2025 11:00 AM [facility] LABS ROOM MEDICAL ONCOLOGY [facility] MED ONC [place]   8/12/2025 11:00 AM Sam Birmingham MD Blood Cancer [facility]   10/21/2025 10:00 AM Jay Noriega MD [place] [facility] DEP        (If no appt send self scheduling link. .REFILLAPPT)  Scheduling request sent?     [] Yes  [x] No    Does patient need updated?  [] Yes  [x] No     Dressing: bandage

## 2025-03-20 ENCOUNTER — OFFICE VISIT (OUTPATIENT)
Dept: FAMILY MEDICINE CLINIC | Age: 65
End: 2025-03-20
Payer: COMMERCIAL

## 2025-03-20 VITALS
BODY MASS INDEX: 18.3 KG/M2 | HEIGHT: 71 IN | OXYGEN SATURATION: 98 % | TEMPERATURE: 98.4 F | WEIGHT: 130.7 LBS | SYSTOLIC BLOOD PRESSURE: 118 MMHG | HEART RATE: 98 BPM | DIASTOLIC BLOOD PRESSURE: 60 MMHG

## 2025-03-20 DIAGNOSIS — J30.9 ALLERGIC SINUSITIS: ICD-10-CM

## 2025-03-20 DIAGNOSIS — C18.9 COLON CANCER METASTASIZED TO INTRA-ABDOMINAL LYMPH NODE (HCC): ICD-10-CM

## 2025-03-20 DIAGNOSIS — E78.5 HYPERLIPIDEMIA, UNSPECIFIED HYPERLIPIDEMIA TYPE: ICD-10-CM

## 2025-03-20 DIAGNOSIS — C77.2 COLON CANCER METASTASIZED TO INTRA-ABDOMINAL LYMPH NODE (HCC): ICD-10-CM

## 2025-03-20 DIAGNOSIS — I10 ESSENTIAL HYPERTENSION: Primary | ICD-10-CM

## 2025-03-20 DIAGNOSIS — F10.90 ALCOHOL USE DISORDER: ICD-10-CM

## 2025-03-20 DIAGNOSIS — T45.1X5A NEUROPATHY DUE TO CHEMOTHERAPEUTIC DRUG: ICD-10-CM

## 2025-03-20 DIAGNOSIS — Z12.5 SCREENING PSA (PROSTATE SPECIFIC ANTIGEN): ICD-10-CM

## 2025-03-20 DIAGNOSIS — G62.0 NEUROPATHY DUE TO CHEMOTHERAPEUTIC DRUG: ICD-10-CM

## 2025-03-20 PROCEDURE — 3078F DIAST BP <80 MM HG: CPT | Performed by: INTERNAL MEDICINE

## 2025-03-20 PROCEDURE — 3074F SYST BP LT 130 MM HG: CPT | Performed by: INTERNAL MEDICINE

## 2025-03-20 PROCEDURE — 1123F ACP DISCUSS/DSCN MKR DOCD: CPT | Performed by: INTERNAL MEDICINE

## 2025-03-20 PROCEDURE — 99214 OFFICE O/P EST MOD 30 MIN: CPT | Performed by: INTERNAL MEDICINE

## 2025-03-20 RX ORDER — AZELASTINE 1 MG/ML
1 SPRAY, METERED NASAL 2 TIMES DAILY
Qty: 60 ML | Refills: 1 | Status: SHIPPED | OUTPATIENT
Start: 2025-03-20

## 2025-03-20 SDOH — ECONOMIC STABILITY: FOOD INSECURITY: WITHIN THE PAST 12 MONTHS, YOU WORRIED THAT YOUR FOOD WOULD RUN OUT BEFORE YOU GOT MONEY TO BUY MORE.: NEVER TRUE

## 2025-03-20 SDOH — ECONOMIC STABILITY: FOOD INSECURITY: WITHIN THE PAST 12 MONTHS, THE FOOD YOU BOUGHT JUST DIDN'T LAST AND YOU DIDN'T HAVE MONEY TO GET MORE.: NEVER TRUE

## 2025-03-20 ASSESSMENT — PATIENT HEALTH QUESTIONNAIRE - PHQ9
SUM OF ALL RESPONSES TO PHQ QUESTIONS 1-9: 0
2. FEELING DOWN, DEPRESSED OR HOPELESS: NOT AT ALL
1. LITTLE INTEREST OR PLEASURE IN DOING THINGS: NOT AT ALL

## 2025-03-20 ASSESSMENT — ENCOUNTER SYMPTOMS
SHORTNESS OF BREATH: 0
BLOOD IN STOOL: 0
EYE DISCHARGE: 0
NAUSEA: 0
ABDOMINAL PAIN: 0

## 2025-03-20 NOTE — PROGRESS NOTES
answered at your visit, please contact the office.    Advise to have ( Fasting) lab test prior to next visit.          Return in about 3 months (around 6/11/2025).

## 2025-05-05 ENCOUNTER — OFFICE VISIT (OUTPATIENT)
Dept: FAMILY MEDICINE CLINIC | Age: 65
End: 2025-05-05
Payer: COMMERCIAL

## 2025-05-05 VITALS
SYSTOLIC BLOOD PRESSURE: 110 MMHG | WEIGHT: 127.8 LBS | BODY MASS INDEX: 17.89 KG/M2 | OXYGEN SATURATION: 97 % | DIASTOLIC BLOOD PRESSURE: 60 MMHG | TEMPERATURE: 97.8 F | HEIGHT: 71 IN | HEART RATE: 93 BPM

## 2025-05-05 DIAGNOSIS — R13.10 DYSPHAGIA, UNSPECIFIED TYPE: ICD-10-CM

## 2025-05-05 DIAGNOSIS — E87.1 HYPONATREMIA: ICD-10-CM

## 2025-05-05 DIAGNOSIS — E87.6 HYPOKALEMIA: ICD-10-CM

## 2025-05-05 DIAGNOSIS — D64.9 ACUTE ON CHRONIC ANEMIA: ICD-10-CM

## 2025-05-05 DIAGNOSIS — Z09 HOSPITAL DISCHARGE FOLLOW-UP: ICD-10-CM

## 2025-05-05 DIAGNOSIS — J18.9 PNEUMONIA OF RIGHT LOWER LOBE DUE TO INFECTIOUS ORGANISM: Primary | ICD-10-CM

## 2025-05-05 PROCEDURE — 99214 OFFICE O/P EST MOD 30 MIN: CPT | Performed by: INTERNAL MEDICINE

## 2025-05-05 PROCEDURE — 3074F SYST BP LT 130 MM HG: CPT | Performed by: INTERNAL MEDICINE

## 2025-05-05 PROCEDURE — 1159F MED LIST DOCD IN RCRD: CPT | Performed by: INTERNAL MEDICINE

## 2025-05-05 PROCEDURE — 1160F RVW MEDS BY RX/DR IN RCRD: CPT | Performed by: INTERNAL MEDICINE

## 2025-05-05 PROCEDURE — 1111F DSCHRG MED/CURRENT MED MERGE: CPT | Performed by: INTERNAL MEDICINE

## 2025-05-05 PROCEDURE — 3078F DIAST BP <80 MM HG: CPT | Performed by: INTERNAL MEDICINE

## 2025-05-05 PROCEDURE — 1123F ACP DISCUSS/DSCN MKR DOCD: CPT | Performed by: INTERNAL MEDICINE

## 2025-05-05 RX ORDER — CEFDINIR 300 MG/1
300 CAPSULE ORAL 2 TIMES DAILY
COMMUNITY

## 2025-05-05 RX ORDER — DOXYCYCLINE HYCLATE 100 MG
100 TABLET ORAL 2 TIMES DAILY
Qty: 10 TABLET | Refills: 0 | Status: SHIPPED | OUTPATIENT
Start: 2025-05-05 | End: 2025-05-10

## 2025-05-05 RX ORDER — DOXYCYCLINE HYCLATE 100 MG
100 TABLET ORAL 2 TIMES DAILY
COMMUNITY
Start: 2025-04-28 | End: 2025-05-05

## 2025-05-05 NOTE — PROGRESS NOTES
Post-Discharge Transitional Care Management Progress Note      Douglas Guerra   YOB: 1960    Date of Office Visit:  5/5/2025  Date of Hospital Admission: 4/26/25  Date of Hospital Discharge: 4/28/25    Care management risk score Rising risk (score 2-5) and Complex Care (Scores >=6): No Risk Score On File     Non face to face  following discharge, date last encounter closed (first attempt may have been earlier): *No documented post hospital discharge outreach found in the last 14 days *No documented post hospital discharge outreach found in the last 14 days    Call initiated 2 business days of discharge: *No response recorded in the last 14 days    ASSESSMENT/PLAN:   Pneumonia of right lower lobe due to infectious organism  -     doxycycline hyclate (VIBRA-TABS) 100 MG tablet; Take 1 tablet by mouth 2 times daily for 5 days, Disp-10 tablet, R-0Normal  -     XR CHEST STANDARD (2 VW); Future  Dysphagia, unspecified type  -     [facility], [facility]  Acute on chronic anemia  -     CBC with Auto Differential; Future  Hypokalemia  -     Basic Metabolic Panel; Future  Hyponatremia  -     Basic Metabolic Panel; Future      Medical Decision Making: high complexity  No follow-ups on file.         Subjective:   HPI:  Follow up of Hospital problems/diagnosis(es):   Pneumonia -RLL  Dysphagia  Hypokalemia  Ac on Ch Anemia   Hyponatremia  Smoke - chronic      Inpatient course: Discharge summary reviewed- see chart.    Interval history/Current status:     Feeling okay  Was discharge on Cefdinir and Doxy x 5 days  [place] get [place]    Over all feeling improving    Has decrease Smoking     Patient Active Problem List   Diagnosis    Colon cancer metastasized to intra-abdominal lymph node (HCC)    Neuropathy due to chemotherapeutic drug    Essential hypertension    Smoker    Abnormal LFTs    Alcohol use    Hyperlipidemia    Alcohol use disorder

## 2025-05-06 ENCOUNTER — HOSPITAL ENCOUNTER (OUTPATIENT)
Age: 65
Discharge: HOME | End: 2025-05-06
Payer: COMMERCIAL

## 2025-05-06 DIAGNOSIS — E87.6 HYPOKALEMIA: ICD-10-CM

## 2025-05-06 DIAGNOSIS — E87.1 HYPONATREMIA: ICD-10-CM

## 2025-05-06 DIAGNOSIS — D64.9 ACUTE ON CHRONIC ANEMIA: ICD-10-CM

## 2025-05-06 LAB
ANION GAP SERPL CALCULATED.3IONS-SCNC: 11 MMOL/L (ref 7–16)
BASOPHILS # BLD: 0.07 K/UL (ref 0–0.2)
BASOPHILS NFR BLD: 1 % (ref 0–2)
BUN SERPL-MCNC: 8 MG/DL (ref 6–23)
CALCIUM SERPL-MCNC: 9.6 MG/DL (ref 8.6–10.2)
CHLORIDE SERPL-SCNC: 97 MMOL/L (ref 98–107)
CO2 SERPL-SCNC: 27 MMOL/L (ref 22–29)
CREAT SERPL-MCNC: 0.9 MG/DL (ref 0.7–1.2)
EOSINOPHIL # BLD: 0.13 K/UL (ref 0.05–0.5)
EOSINOPHILS RELATIVE PERCENT: 2 % (ref 0–6)
ERYTHROCYTE [DISTWIDTH] IN BLOOD BY AUTOMATED COUNT: 12.9 % (ref 11.5–15)
GFR, ESTIMATED: >90 ML/MIN/1.73M2
GLUCOSE SERPL-MCNC: 103 MG/DL (ref 74–99)
HCT VFR BLD AUTO: 33.6 % (ref 37–54)
HGB BLD-MCNC: 11.3 G/DL (ref 12.5–16.5)
IMM GRANULOCYTES # BLD AUTO: 0.11 K/UL (ref 0–0.58)
IMM GRANULOCYTES NFR BLD: 1 % (ref 0–5)
LYMPHOCYTES NFR BLD: 1.53 K/UL (ref 1.5–4)
LYMPHOCYTES RELATIVE PERCENT: 20 % (ref 20–42)
MCH RBC QN AUTO: 33 PG (ref 26–35)
MCHC RBC AUTO-ENTMCNC: 33.6 G/DL (ref 32–34.5)
MCV RBC AUTO: 98.2 FL (ref 80–99.9)
MONOCYTES NFR BLD: 0.92 K/UL (ref 0.1–0.95)
MONOCYTES NFR BLD: 12 % (ref 2–12)
NEUTROPHILS NFR BLD: 64 % (ref 43–80)
NEUTS SEG NFR BLD: 4.96 K/UL (ref 1.8–7.3)
PLATELET # BLD AUTO: 519 K/UL (ref 130–450)
PMV BLD AUTO: 9.5 FL (ref 7–12)
POTASSIUM SERPL-SCNC: 4.1 MMOL/L (ref 3.5–5)
RBC # BLD AUTO: 3.42 M/UL (ref 3.8–5.8)
SODIUM SERPL-SCNC: 135 MMOL/L (ref 132–146)
WBC OTHER # BLD: 7.7 K/UL (ref 4.5–11.5)

## 2025-05-06 PROCEDURE — 85025 COMPLETE CBC W/AUTO DIFF WBC: CPT

## 2025-05-06 PROCEDURE — 36415 COLL VENOUS BLD VENIPUNCTURE: CPT

## 2025-05-06 PROCEDURE — 80048 BASIC METABOLIC PNL TOTAL CA: CPT

## 2025-06-30 ENCOUNTER — OFFICE VISIT (OUTPATIENT)
Dept: FAMILY MEDICINE CLINIC | Age: 65
End: 2025-06-30
Payer: COMMERCIAL

## 2025-06-30 VITALS
TEMPERATURE: 97.8 F | WEIGHT: 124.5 LBS | HEIGHT: 71 IN | HEART RATE: 114 BPM | DIASTOLIC BLOOD PRESSURE: 98 MMHG | BODY MASS INDEX: 17.43 KG/M2 | OXYGEN SATURATION: 98 % | SYSTOLIC BLOOD PRESSURE: 154 MMHG

## 2025-06-30 DIAGNOSIS — T45.1X5A NEUROPATHY DUE TO CHEMOTHERAPEUTIC DRUG: ICD-10-CM

## 2025-06-30 DIAGNOSIS — F10.90 ALCOHOL USE DISORDER: ICD-10-CM

## 2025-06-30 DIAGNOSIS — E78.5 HYPERLIPIDEMIA, UNSPECIFIED HYPERLIPIDEMIA TYPE: ICD-10-CM

## 2025-06-30 DIAGNOSIS — G62.0 NEUROPATHY DUE TO CHEMOTHERAPEUTIC DRUG: ICD-10-CM

## 2025-06-30 DIAGNOSIS — C18.9 COLON CANCER METASTASIZED TO INTRA-ABDOMINAL LYMPH NODE (HCC): ICD-10-CM

## 2025-06-30 DIAGNOSIS — I10 ESSENTIAL HYPERTENSION: Primary | ICD-10-CM

## 2025-06-30 DIAGNOSIS — C77.2 COLON CANCER METASTASIZED TO INTRA-ABDOMINAL LYMPH NODE (HCC): ICD-10-CM

## 2025-06-30 DIAGNOSIS — R13.10 DYSPHAGIA, UNSPECIFIED TYPE: ICD-10-CM

## 2025-06-30 PROCEDURE — 1123F ACP DISCUSS/DSCN MKR DOCD: CPT | Performed by: INTERNAL MEDICINE

## 2025-06-30 PROCEDURE — 1159F MED LIST DOCD IN RCRD: CPT | Performed by: INTERNAL MEDICINE

## 2025-06-30 PROCEDURE — 99214 OFFICE O/P EST MOD 30 MIN: CPT | Performed by: INTERNAL MEDICINE

## 2025-06-30 PROCEDURE — 3079F DIAST BP 80-89 MM HG: CPT | Performed by: INTERNAL MEDICINE

## 2025-06-30 PROCEDURE — 3077F SYST BP >= 140 MM HG: CPT | Performed by: INTERNAL MEDICINE

## 2025-06-30 RX ORDER — AMLODIPINE BESYLATE 5 MG/1
5 TABLET ORAL DAILY
Qty: 90 TABLET | Refills: 0 | Status: SHIPPED | OUTPATIENT
Start: 2025-06-30 | End: 2025-09-28

## 2025-06-30 RX ORDER — AMLODIPINE BESYLATE 10 MG/1
10 TABLET ORAL DAILY
Qty: 90 TABLET | Refills: 0 | Status: SHIPPED | OUTPATIENT
Start: 2025-06-30 | End: 2025-06-30

## 2025-06-30 ASSESSMENT — ENCOUNTER SYMPTOMS
SHORTNESS OF BREATH: 0
BLOOD IN STOOL: 0
EYE PAIN: 0
SORE THROAT: 0
SINUS PAIN: 0
ABDOMINAL PAIN: 0
EYE DISCHARGE: 0
NAUSEA: 0

## 2025-06-30 NOTE — PROGRESS NOTES
Chief Complaint   Patient presents with    Pneumonia     Pt here for follow up on Pneumonia, pt reports feeling good. No new concerns. Did not get chest x-ray done     Discuss Labs     Completed on 05/06/2025     Health Maintenance     AWV- 10/21/2025, Colonoscopy - pt declined         HPI:  Patient is here for follow-up     Feeling okay Smoke about 1/2 PPD    Drinks -\" less than before\", \" about 3 beers a day\"    Allergy and Medications are reviewed and updated.  Past Medical History, Surgical History, and Family History has been reviewed and updated.    Review of Systems:  Review of Systems   Constitutional:  Negative for chills and fever.   HENT:  Negative for congestion, sinus pain and sore throat.    Eyes:  Negative for pain and discharge.   Respiratory:  Negative for shortness of breath (No new SOb).    Cardiovascular:  Negative for chest pain.   Gastrointestinal:  Negative for abdominal pain, blood in stool and nausea.   Genitourinary:  Negative for flank pain and frequency.   Musculoskeletal:  Negative for neck pain.   Hematological:  Does not bruise/bleed easily.   Psychiatric/Behavioral:  Negative for suicidal ideas.          Vitals:    06/30/25 1040 06/30/25 1044   BP: (!) 142/88 (!) 154/98   BP Site: Left Upper Arm Left Upper Arm   Patient Position: Sitting Sitting   Pulse: (!) 114    Temp: 97.8 °F (36.6 °C)    TempSrc: Temporal    SpO2: 98%    Weight: 56.5 kg (124 lb 8 oz)    Height: 1.803 m (5' 11\")        Physical Exam  Vitals reviewed.   Constitutional:       Appearance: He is well-developed.   HENT:      Head: Normocephalic and atraumatic.   Eyes:      Conjunctiva/sclera: Conjunctivae normal.      Pupils: Pupils are equal, round, and reactive to light.   Neck:      Vascular: No JVD.   Cardiovascular:      Rate and Rhythm: Normal rate and regular rhythm.   Pulmonary:      Effort: Pulmonary effort is normal.      Breath sounds: Normal breath sounds.   Abdominal:      General: Bowel sounds are normal.

## 2025-06-30 NOTE — PATIENT INSTRUCTIONS
Monitor BP and Keep a log, Bring it with next visit     The medication list included in this document is our record of what you are currently taking, including any changes that were made at today's visit.  If you find any differences when compared to your medications at home, or have any questions that were not answered at your visit, please contact the office.    Advise to have ( Fasting) lab test prior to next visit.

## 2025-09-02 ENCOUNTER — HOSPITAL ENCOUNTER (OUTPATIENT)
Dept: CT IMAGING | Age: 65
Discharge: HOME | End: 2025-09-02
Attending: INTERNAL MEDICINE
Payer: COMMERCIAL

## 2025-09-02 DIAGNOSIS — C18.9 COLON CANCER METASTASIZED TO INTRA-ABDOMINAL LYMPH NODE (HCC): Primary | ICD-10-CM

## 2025-09-02 DIAGNOSIS — C77.2 COLON CANCER METASTASIZED TO INTRA-ABDOMINAL LYMPH NODE (HCC): ICD-10-CM

## 2025-09-02 DIAGNOSIS — C18.9 COLON CANCER METASTASIZED TO INTRA-ABDOMINAL LYMPH NODE (HCC): ICD-10-CM

## 2025-09-02 DIAGNOSIS — C77.2 COLON CANCER METASTASIZED TO INTRA-ABDOMINAL LYMPH NODE (HCC): Primary | ICD-10-CM

## 2025-09-02 LAB
ALBUMIN SERPL-MCNC: 4.6 G/DL (ref 3.5–5.2)
ALP SERPL-CCNC: 159 U/L (ref 40–129)
ALT SERPL-CCNC: 48 U/L (ref 0–50)
ANION GAP SERPL CALCULATED.3IONS-SCNC: 17 MMOL/L (ref 7–16)
AST SERPL-CCNC: 128 U/L (ref 0–50)
BILIRUB SERPL-MCNC: 0.8 MG/DL (ref 0–1.2)
BUN SERPL-MCNC: 7 MG/DL (ref 8–23)
CALCIUM SERPL-MCNC: 9.7 MG/DL (ref 8.8–10.2)
CHLORIDE SERPL-SCNC: 93 MMOL/L (ref 98–107)
CO2 SERPL-SCNC: 22 MMOL/L (ref 22–29)
CREAT SERPL-MCNC: 0.9 MG/DL (ref 0.7–1.2)
GFR, ESTIMATED: >90 ML/MIN/1.73M2
GLUCOSE SERPL-MCNC: 95 MG/DL (ref 74–99)
POTASSIUM SERPL-SCNC: 3.9 MMOL/L (ref 3.5–5.1)
PROT SERPL-MCNC: 8.2 G/DL (ref 6.4–8.3)
SODIUM SERPL-SCNC: 132 MMOL/L (ref 136–145)

## 2025-09-02 PROCEDURE — 80053 COMPREHEN METABOLIC PANEL: CPT

## 2025-09-02 PROCEDURE — 36415 COLL VENOUS BLD VENIPUNCTURE: CPT

## 2025-09-02 PROCEDURE — 6360000004 HC RX CONTRAST MEDICATION: Performed by: RADIOLOGY

## 2025-09-02 PROCEDURE — 74177 CT ABD & PELVIS W/CONTRAST: CPT

## 2025-09-02 RX ORDER — IOPAMIDOL 755 MG/ML
75 INJECTION, SOLUTION INTRAVASCULAR
Status: COMPLETED | OUTPATIENT
Start: 2025-09-02 | End: 2025-09-02

## 2025-09-02 RX ADMIN — IOPAMIDOL 75 ML: 755 INJECTION, SOLUTION INTRAVENOUS at 17:41

## (undated) DEVICE — NDL CNTR 40CT FM MAG: Brand: MEDLINE INDUSTRIES, INC.

## (undated) DEVICE — MARKER,SKIN,WI/RULER AND LABELS: Brand: MEDLINE

## (undated) DEVICE — BLADE ES ELASTOMERIC COAT INSUL DURABLE BEND UPTO 90DEG

## (undated) DEVICE — TOWEL,OR,DSP,ST,BLUE,STD,6/PK,12PK/CS: Brand: MEDLINE

## (undated) DEVICE — GAUZE,SPONGE,2"X2",8PLY,STERILE,LF,2'S: Brand: MEDLINE

## (undated) DEVICE — GOWN,SIRUS,NONRNF,SETINSLV,XL,20/CS: Brand: MEDLINE

## (undated) DEVICE — GAUZE,SPONGE,4"X4",16PLY,XRAY,STRL,LF: Brand: MEDLINE

## (undated) DEVICE — COVER,LIGHT HANDLE,FLX,1/PK: Brand: MEDLINE INDUSTRIES, INC.

## (undated) DEVICE — APPLICATOR MEDICATED 26 CC SOLUTION HI LT ORNG CHLORAPREP

## (undated) DEVICE — NEEDLE HYPO 25GA L1.5IN BLU POLYPR HUB S STL REG BVL STR

## (undated) DEVICE — INTENDED FOR TISSUE SEPARATION, AND OTHER PROCEDURES THAT REQUIRE A SHARP SURGICAL BLADE TO PUNCTURE OR CUT.: Brand: BARD-PARKER ® STAINLESS STEEL BLADES

## (undated) DEVICE — STRIP,CLOSURE,WOUND,MEDI-STRIP,1/4X3: Brand: MEDLINE

## (undated) DEVICE — CANNULA IV 18GA L15IN BLNT FILL LUERLOCK HUB MJCT

## (undated) DEVICE — DOUBLE BASIN SET: Brand: MEDLINE INDUSTRIES, INC.

## (undated) DEVICE — SYRINGE MED 10ML TRNSLUC BRL PLUNG BLK MRK POLYPR CTRL

## (undated) DEVICE — DRESSING TRNSPAR W5XL4.5IN FLM SHT SEMIPERMEABLE WIND

## (undated) DEVICE — GLOVE SURG SZ 65 THK91MIL LTX FREE SYN POLYISOPRENE

## (undated) DEVICE — SET SURG INSTR DISSECT

## (undated) DEVICE — GLOVE ORANGE PI 7 1/2   MSG9075

## (undated) DEVICE — PENCIL ES L3M BTTN SWCH HOLSTER W/ BLDE ELECTRD EDGE

## (undated) DEVICE — PACK,LAPAROTOMY,NO GOWNS: Brand: MEDLINE